# Patient Record
Sex: FEMALE | Race: WHITE
[De-identification: names, ages, dates, MRNs, and addresses within clinical notes are randomized per-mention and may not be internally consistent; named-entity substitution may affect disease eponyms.]

---

## 2018-03-26 PROBLEM — Z00.00 ENCOUNTER FOR PREVENTIVE HEALTH EXAMINATION: Status: ACTIVE | Noted: 2018-03-26

## 2018-03-28 ENCOUNTER — APPOINTMENT (OUTPATIENT)
Dept: OBGYN | Facility: CLINIC | Age: 68
End: 2018-03-28
Payer: MEDICARE

## 2018-03-28 VITALS
HEIGHT: 62 IN | BODY MASS INDEX: 26.31 KG/M2 | OXYGEN SATURATION: 98 % | DIASTOLIC BLOOD PRESSURE: 76 MMHG | SYSTOLIC BLOOD PRESSURE: 118 MMHG | HEART RATE: 85 BPM | WEIGHT: 143 LBS

## 2018-03-28 DIAGNOSIS — Z80.3 FAMILY HISTORY OF MALIGNANT NEOPLASM OF BREAST: ICD-10-CM

## 2018-03-28 DIAGNOSIS — Z78.9 OTHER SPECIFIED HEALTH STATUS: ICD-10-CM

## 2018-03-28 DIAGNOSIS — R92.2 INCONCLUSIVE MAMMOGRAM: ICD-10-CM

## 2018-03-28 DIAGNOSIS — Z80.0 FAMILY HISTORY OF MALIGNANT NEOPLASM OF DIGESTIVE ORGANS: ICD-10-CM

## 2018-03-28 DIAGNOSIS — Z87.891 PERSONAL HISTORY OF NICOTINE DEPENDENCE: ICD-10-CM

## 2018-03-28 DIAGNOSIS — Z82.49 FAMILY HISTORY OF ISCHEMIC HEART DISEASE AND OTHER DISEASES OF THE CIRCULATORY SYSTEM: ICD-10-CM

## 2018-03-28 DIAGNOSIS — H54.8 LEGAL BLINDNESS, AS DEFINED IN USA: ICD-10-CM

## 2018-03-28 DIAGNOSIS — N76.0 ACUTE VAGINITIS: ICD-10-CM

## 2018-03-28 DIAGNOSIS — N95.9 UNSPECIFIED MENOPAUSAL AND PERIMENOPAUSAL DISORDER: ICD-10-CM

## 2018-03-28 DIAGNOSIS — D27.9 BENIGN NEOPLASM OF UNSPECIFIED OVARY: ICD-10-CM

## 2018-03-28 PROCEDURE — 99203 OFFICE O/P NEW LOW 30 MIN: CPT

## 2018-03-28 RX ORDER — LOSARTAN POTASSIUM 50 MG/1
50 TABLET, FILM COATED ORAL
Refills: 0 | Status: ACTIVE | COMMUNITY

## 2018-03-28 RX ORDER — ASCORBIC ACID 500 MG
500 TABLET ORAL
Refills: 0 | Status: ACTIVE | COMMUNITY

## 2018-03-28 RX ORDER — CHROMIUM 200 MCG
1000 TABLET ORAL
Refills: 0 | Status: ACTIVE | COMMUNITY

## 2018-04-05 ENCOUNTER — OTHER (OUTPATIENT)
Age: 68
End: 2018-04-05

## 2018-04-08 LAB
CANDIDA VAG CYTO: NOT DETECTED
CYTOLOGY CVX/VAG DOC THIN PREP: NORMAL
G VAGINALIS+PREV SP MTYP VAG QL MICRO: NOT DETECTED
HPV HIGH+LOW RISK DNA PNL CVX: NOT DETECTED
T VAGINALIS VAG QL WET PREP: NOT DETECTED

## 2018-09-12 ENCOUNTER — OTHER (OUTPATIENT)
Age: 68
End: 2018-09-12

## 2019-08-30 ENCOUNTER — APPOINTMENT (OUTPATIENT)
Dept: OBGYN | Facility: CLINIC | Age: 69
End: 2019-08-30
Payer: MEDICARE

## 2019-08-30 VITALS
DIASTOLIC BLOOD PRESSURE: 70 MMHG | SYSTOLIC BLOOD PRESSURE: 110 MMHG | OXYGEN SATURATION: 98 % | HEART RATE: 68 BPM | BODY MASS INDEX: 25.21 KG/M2 | WEIGHT: 137 LBS | HEIGHT: 62 IN

## 2019-08-30 DIAGNOSIS — Z01.419 ENCOUNTER FOR GYNECOLOGICAL EXAMINATION (GENERAL) (ROUTINE) W/OUT ABNORMAL FINDINGS: ICD-10-CM

## 2019-08-30 PROCEDURE — G0101: CPT

## 2019-08-30 RX ORDER — ESCITALOPRAM OXALATE 10 MG/1
10 TABLET ORAL
Refills: 0 | Status: DISCONTINUED | COMMUNITY
End: 2019-08-30

## 2019-08-30 NOTE — PHYSICAL EXAM
[Awake] : awake [Alert] : alert [Soft] : soft [Oriented x3] : oriented to person, place, and time [Normal] : cervix [No Bleeding] : there was no active vaginal bleeding [Mass] : no breast mass [Acute Distress] : no acute distress [Axillary LAD] : no axillary lymphadenopathy [Nipple Discharge] : no nipple discharge [Tender] : non tender [Atrophy] : atrophy [Absent] : absent [Adnexa Absent] : absent bilaterally

## 2019-08-30 NOTE — CHIEF COMPLAINT
[Annual Visit] : annual visit [FreeTextEntry1] : P0\par rec [place] for vag dryness--[place] off season\par pap 2018\par mammo 2018,colon 2014,dexa 2018\par DANISHA/BSO--dermoid-2001

## 2019-08-30 NOTE — HISTORY OF PRESENT ILLNESS
[Good] : being in good health [Postmenopausal] : is postmenopausal [HPV Vaccine NA Due to Age] : HPV vaccine not available to patient due to age [Sexually Active] : is sexually active

## 2020-12-16 PROBLEM — N76.0 ACUTE VAGINITIS: Status: RESOLVED | Noted: 2018-03-28 | Resolved: 2020-12-16

## 2020-12-21 PROBLEM — Z01.419 ENCOUNTER FOR ANNUAL ROUTINE GYNECOLOGICAL EXAMINATION: Status: RESOLVED | Noted: 2019-08-28 | Resolved: 2020-12-21

## 2021-12-28 ENCOUNTER — EMERGENCY (EMERGENCY)
Facility: HOSPITAL | Age: 71
LOS: 0 days | Discharge: HOME | End: 2021-12-28
Attending: STUDENT IN AN ORGANIZED HEALTH CARE EDUCATION/TRAINING PROGRAM
Payer: MEDICARE

## 2021-12-28 VITALS
RESPIRATION RATE: 18 BRPM | DIASTOLIC BLOOD PRESSURE: 60 MMHG | SYSTOLIC BLOOD PRESSURE: 104 MMHG | HEART RATE: 78 BPM | OXYGEN SATURATION: 100 % | TEMPERATURE: 98 F

## 2021-12-28 VITALS
TEMPERATURE: 98 F | DIASTOLIC BLOOD PRESSURE: 119 MMHG | SYSTOLIC BLOOD PRESSURE: 148 MMHG | RESPIRATION RATE: 20 BRPM | HEIGHT: 62 IN | OXYGEN SATURATION: 99 % | HEART RATE: 77 BPM

## 2021-12-28 DIAGNOSIS — R63.0 ANOREXIA: ICD-10-CM

## 2021-12-28 DIAGNOSIS — Z20.822 CONTACT WITH AND (SUSPECTED) EXPOSURE TO COVID-19: ICD-10-CM

## 2021-12-28 DIAGNOSIS — R07.89 OTHER CHEST PAIN: ICD-10-CM

## 2021-12-28 DIAGNOSIS — R42 DIZZINESS AND GIDDINESS: ICD-10-CM

## 2021-12-28 DIAGNOSIS — Z79.82 LONG TERM (CURRENT) USE OF ASPIRIN: ICD-10-CM

## 2021-12-28 DIAGNOSIS — Z90.710 ACQUIRED ABSENCE OF BOTH CERVIX AND UTERUS: Chronic | ICD-10-CM

## 2021-12-28 DIAGNOSIS — Z86.69 PERSONAL HISTORY OF OTHER DISEASES OF THE NERVOUS SYSTEM AND SENSE ORGANS: Chronic | ICD-10-CM

## 2021-12-28 DIAGNOSIS — I10 ESSENTIAL (PRIMARY) HYPERTENSION: ICD-10-CM

## 2021-12-28 LAB
ALBUMIN SERPL ELPH-MCNC: 3.5 G/DL — SIGNIFICANT CHANGE UP (ref 3.3–5)
ALP SERPL-CCNC: 88 U/L — SIGNIFICANT CHANGE UP (ref 40–120)
ALT FLD-CCNC: 26 U/L — SIGNIFICANT CHANGE UP (ref 12–78)
ANION GAP SERPL CALC-SCNC: 5 MMOL/L — SIGNIFICANT CHANGE UP (ref 5–17)
APPEARANCE UR: CLEAR — SIGNIFICANT CHANGE UP
APTT BLD: 30.8 SEC — SIGNIFICANT CHANGE UP (ref 27.5–35.5)
AST SERPL-CCNC: 29 U/L — SIGNIFICANT CHANGE UP (ref 15–37)
BACTERIA # UR AUTO: NEGATIVE — SIGNIFICANT CHANGE UP
BASOPHILS # BLD AUTO: 0.09 K/UL — SIGNIFICANT CHANGE UP (ref 0–0.2)
BASOPHILS NFR BLD AUTO: 0.9 % — SIGNIFICANT CHANGE UP (ref 0–2)
BILIRUB SERPL-MCNC: 0.4 MG/DL — SIGNIFICANT CHANGE UP (ref 0.2–1.2)
BILIRUB UR-MCNC: NEGATIVE — SIGNIFICANT CHANGE UP
BUN SERPL-MCNC: 15 MG/DL — SIGNIFICANT CHANGE UP (ref 7–23)
CALCIUM SERPL-MCNC: 9.3 MG/DL — SIGNIFICANT CHANGE UP (ref 8.5–10.1)
CHLORIDE SERPL-SCNC: 105 MMOL/L — SIGNIFICANT CHANGE UP (ref 96–108)
CO2 SERPL-SCNC: 25 MMOL/L — SIGNIFICANT CHANGE UP (ref 22–31)
COLOR SPEC: YELLOW — SIGNIFICANT CHANGE UP
CREAT SERPL-MCNC: 0.74 MG/DL — SIGNIFICANT CHANGE UP (ref 0.5–1.3)
DIFF PNL FLD: ABNORMAL
EOSINOPHIL # BLD AUTO: 0.38 K/UL — SIGNIFICANT CHANGE UP (ref 0–0.5)
EOSINOPHIL NFR BLD AUTO: 3.7 % — SIGNIFICANT CHANGE UP (ref 0–6)
EPI CELLS # UR: NEGATIVE — SIGNIFICANT CHANGE UP
GLUCOSE SERPL-MCNC: 98 MG/DL — SIGNIFICANT CHANGE UP (ref 70–99)
GLUCOSE UR QL: NEGATIVE MG/DL — SIGNIFICANT CHANGE UP
HCT VFR BLD CALC: 40.8 % — SIGNIFICANT CHANGE UP (ref 34.5–45)
HGB BLD-MCNC: 13.4 G/DL — SIGNIFICANT CHANGE UP (ref 11.5–15.5)
HYALINE CASTS # UR AUTO: ABNORMAL /LPF
IMM GRANULOCYTES NFR BLD AUTO: 0.3 % — SIGNIFICANT CHANGE UP (ref 0–1.5)
INR BLD: 1.02 RATIO — SIGNIFICANT CHANGE UP (ref 0.88–1.16)
KETONES UR-MCNC: NEGATIVE — SIGNIFICANT CHANGE UP
LEUKOCYTE ESTERASE UR-ACNC: NEGATIVE — SIGNIFICANT CHANGE UP
LYMPHOCYTES # BLD AUTO: 1.95 K/UL — SIGNIFICANT CHANGE UP (ref 1–3.3)
LYMPHOCYTES # BLD AUTO: 19.1 % — SIGNIFICANT CHANGE UP (ref 13–44)
MCHC RBC-ENTMCNC: 30.1 PG — SIGNIFICANT CHANGE UP (ref 27–34)
MCHC RBC-ENTMCNC: 32.8 GM/DL — SIGNIFICANT CHANGE UP (ref 32–36)
MCV RBC AUTO: 91.7 FL — SIGNIFICANT CHANGE UP (ref 80–100)
MONOCYTES # BLD AUTO: 0.66 K/UL — SIGNIFICANT CHANGE UP (ref 0–0.9)
MONOCYTES NFR BLD AUTO: 6.5 % — SIGNIFICANT CHANGE UP (ref 2–14)
NEUTROPHILS # BLD AUTO: 7.08 K/UL — SIGNIFICANT CHANGE UP (ref 1.8–7.4)
NEUTROPHILS NFR BLD AUTO: 69.5 % — SIGNIFICANT CHANGE UP (ref 43–77)
NITRITE UR-MCNC: NEGATIVE — SIGNIFICANT CHANGE UP
PH UR: 5 — SIGNIFICANT CHANGE UP (ref 5–8)
PLATELET # BLD AUTO: 309 K/UL — SIGNIFICANT CHANGE UP (ref 150–400)
POTASSIUM SERPL-MCNC: 4.2 MMOL/L — SIGNIFICANT CHANGE UP (ref 3.5–5.3)
POTASSIUM SERPL-SCNC: 4.2 MMOL/L — SIGNIFICANT CHANGE UP (ref 3.5–5.3)
PROT SERPL-MCNC: 7 GM/DL — SIGNIFICANT CHANGE UP (ref 6–8.3)
PROT UR-MCNC: NEGATIVE MG/DL — SIGNIFICANT CHANGE UP
PROTHROM AB SERPL-ACNC: 11.8 SEC — SIGNIFICANT CHANGE UP (ref 10.6–13.6)
RBC # BLD: 4.45 M/UL — SIGNIFICANT CHANGE UP (ref 3.8–5.2)
RBC # FLD: 11.9 % — SIGNIFICANT CHANGE UP (ref 10.3–14.5)
RBC CASTS # UR COMP ASSIST: SIGNIFICANT CHANGE UP /HPF (ref 0–4)
SARS-COV-2 RNA SPEC QL NAA+PROBE: SIGNIFICANT CHANGE UP
SODIUM SERPL-SCNC: 135 MMOL/L — SIGNIFICANT CHANGE UP (ref 135–145)
SP GR SPEC: 1.01 — SIGNIFICANT CHANGE UP (ref 1.01–1.02)
TROPONIN I, HIGH SENSITIVITY RESULT: 10.51 NG/L — SIGNIFICANT CHANGE UP
TROPONIN I, HIGH SENSITIVITY RESULT: 28.22 NG/L — SIGNIFICANT CHANGE UP
UROBILINOGEN FLD QL: NEGATIVE MG/DL — SIGNIFICANT CHANGE UP
WBC # BLD: 10.19 K/UL — SIGNIFICANT CHANGE UP (ref 3.8–10.5)
WBC # FLD AUTO: 10.19 K/UL — SIGNIFICANT CHANGE UP (ref 3.8–10.5)
WBC UR QL: SIGNIFICANT CHANGE UP

## 2021-12-28 PROCEDURE — 99283 EMERGENCY DEPT VISIT LOW MDM: CPT | Mod: 25

## 2021-12-28 PROCEDURE — 85610 PROTHROMBIN TIME: CPT

## 2021-12-28 PROCEDURE — 85730 THROMBOPLASTIN TIME PARTIAL: CPT

## 2021-12-28 PROCEDURE — U0005: CPT

## 2021-12-28 PROCEDURE — 81001 URINALYSIS AUTO W/SCOPE: CPT

## 2021-12-28 PROCEDURE — 99285 EMERGENCY DEPT VISIT HI MDM: CPT | Mod: CS

## 2021-12-28 PROCEDURE — 85025 COMPLETE CBC W/AUTO DIFF WBC: CPT

## 2021-12-28 PROCEDURE — 36415 COLL VENOUS BLD VENIPUNCTURE: CPT

## 2021-12-28 PROCEDURE — 84484 ASSAY OF TROPONIN QUANT: CPT

## 2021-12-28 PROCEDURE — 93005 ELECTROCARDIOGRAM TRACING: CPT

## 2021-12-28 PROCEDURE — 71045 X-RAY EXAM CHEST 1 VIEW: CPT

## 2021-12-28 PROCEDURE — 93010 ELECTROCARDIOGRAM REPORT: CPT

## 2021-12-28 PROCEDURE — 80053 COMPREHEN METABOLIC PANEL: CPT

## 2021-12-28 PROCEDURE — 71045 X-RAY EXAM CHEST 1 VIEW: CPT | Mod: 26

## 2021-12-28 PROCEDURE — U0003: CPT

## 2021-12-28 RX ORDER — FAMOTIDINE 10 MG/ML
20 INJECTION INTRAVENOUS DAILY
Refills: 0 | Status: DISCONTINUED | OUTPATIENT
Start: 2021-12-28 | End: 2021-12-28

## 2021-12-28 RX ORDER — SODIUM CHLORIDE 9 MG/ML
1000 INJECTION INTRAMUSCULAR; INTRAVENOUS; SUBCUTANEOUS ONCE
Refills: 0 | Status: COMPLETED | OUTPATIENT
Start: 2021-12-28 | End: 2021-12-28

## 2021-12-28 RX ADMIN — Medication 30 MILLILITER(S): at 13:46

## 2021-12-28 RX ADMIN — SODIUM CHLORIDE 1000 MILLILITER(S): 9 INJECTION INTRAMUSCULAR; INTRAVENOUS; SUBCUTANEOUS at 13:46

## 2021-12-28 RX ADMIN — FAMOTIDINE 20 MILLIGRAM(S): 10 INJECTION INTRAVENOUS at 13:46

## 2021-12-28 NOTE — ED PROVIDER NOTE - OBJECTIVE STATEMENT
71 F history afib, HTN, legally blind here feeling light headed and with chest pressure. patient has not had much of an appetite. increased stress due to sick . no significant shortness of breath, chest pain is described as tightness, no radiation of symptoms. no nausea, vomiting. prior smoker.    EP: Dr. Guerrero  PMD Dr. Corbin.

## 2021-12-28 NOTE — ED ADULT TRIAGE NOTE - CHIEF COMPLAINT QUOTE
Pt arrives to ED complaining of chest pain for two hours prior to arrival. Pt took one baby aspirin prior to arrival. Hx afib-s/p ablation, loop recorder.

## 2021-12-28 NOTE — ED PROVIDER NOTE - NS ED ROS FT
Constitutional: No fever.  Eyes: No vision changes.   Ears, Nose, Mouth, Throat: No congestion.  Cardiovascular: +chest pain. +lightheaded.  Respiratory: No difficulty breathing.  Gastrointestinal: No nausea . No vomiting.  Genitourinary: No dysuria.  Musculoskeletal: No joint pain.  Neurological: No headache.   Integumentary (skin and/or breast): No rash.

## 2021-12-28 NOTE — ED ADULT NURSE NOTE - OBJECTIVE STATEMENT
pt. c/o chest pain and dizziness, pt. states she has been under a lot of stress, her  is very sick and she has no help at home, pt. states she does not want to speak to SW.

## 2021-12-28 NOTE — ED PROVIDER NOTE - NSFOLLOWUPINSTRUCTIONS_ED_ALL_ED_FT
Please follow up with your Primary Care Physician and any specialists as discussed.  Please take your medications as prescribed.  If your symptoms persist or worsen, please seek care. Either return to the Emergency Department, go to urgent care or see your primary care doctor.  See refer to general information and follow up instructions below:       Lightheadedness    WHAT YOU NEED TO KNOW:    Lightheadedness is the feeling that you may faint, but you do not. Your heartbeat may be fast or feel like it flutters. Lightheadedness may occur when you take certain medicines, such as medicine to lower your blood pressure. Dehydration, low sodium, low blood sugar, an abnormal heart rhythm, and anxiety are other common causes.     DISCHARGE INSTRUCTIONS:    Return to the emergency department if:   •You have sudden chest pain.       •You have trouble breathing or shortness of breath.       •You have vision changes, are sweating, and have nausea while you are sitting or lying down.       •You feel flushed and your heart is fluttering.      •You faint.       Contact your healthcare provider if:   •You feel lightheaded often.       •Your heart beats faster or slower than usual.       •You have questions or concerns about your condition or care.      Follow up with your healthcare provider as directed: You may need more tests to help find the cause of your lightheadedness. The tests will help healthcare providers plan the best treatment for you. Write down your questions so you remember to ask them during your visits.     Self-care: Talk with your healthcare provider about these and other ways to manage your symptoms:  •Lie down when you feel lightheaded, your throat gets tight, or your vision changes. Raise your legs above the level of your heart.      •Stand up slowly. Sit on the side of the bed or couch for a few minutes before you stand up.       •Take slow, deep breaths when you feel lightheaded. This can help decrease the feeling that you might faint.       •Ask if you need to avoid hot baths and saunas. These may make your symptoms worse.       Watch for signs of low blood sugar: These include hunger, nervousness, sweating, and fast or fluttery heartbeats. Talk with your healthcare provider about ways to keep your blood sugar level steady.    Check your blood pressure often: You should do this especially if you take medicine to lower your blood pressure. Check your blood pressure when you are lying down and when you are standing. Ask how often to check during the day. Keep a record of your blood pressure numbers. Your healthcare provider may use the record to help plan your treatment.    Keep a record of your lightheadedness episodes: Include your symptoms and your activity before and after the episode. The record can help your healthcare provider find the cause of your lightheadedness and help you manage episodes.

## 2021-12-28 NOTE — ED PROVIDER NOTE - PHYSICAL EXAMINATION
Vital signs as available reviewed.  General:  Comfortable, no acute distress.  Head:  Normocephalic, atraumatic.  Eyes:  Conjunctiva pink, no icterus.  Cardiovascular:  Regular rate, no obvious murmur.  Respiratory:  Clear to auscultation, good air entry bilaterally.  Abdomen:  Soft, non-tender.  Musculoskeletal:  No deformity or calf tenderness.  Neurologic: Alert and oriented, moving all extremities. CN2-12 intact.  Skin:  Warm and dry.

## 2021-12-28 NOTE — ED PROVIDER NOTE - PATIENT PORTAL LINK FT
You can access the FollowMyHealth Patient Portal offered by [facility] by registering at the following website: [email or web address]. By joining [employer]’s FollowMyHealth portal, you will also be able to view your health information using other applications (apps) compatible with our system.

## 2021-12-28 NOTE — ED PROVIDER NOTE - NSICDXPASTMEDICALHX_GEN_ALL_CORE_FT
PAST MEDICAL HISTORY:  Anxiety and depression     Arthritis     Bronchitis     Hypercholesterolemia     Hypertension     Legally blind Bilaterally - notes L< 20% vision    Muscle cramps     Primary osteoarthritis of right hip

## 2021-12-28 NOTE — ED PROVIDER NOTE - CARE PROVIDER_API CALL
Joe Corbin  INTERNAL MEDICINE  [address] [place] [address]  Phone: [phone]  Fax: [phone]  Established Patient  Follow Up Time: 1-3 Days    German Paredes (DO)  Cardiology  [address] [place] [address]  Phone: [phone]  Fax: [phone]  Follow Up Time: 4-6 Days

## 2021-12-28 NOTE — ED PROVIDER NOTE - PROVIDER TOKENS
PROVIDER:[TOKEN:[[id number]],FOLLOWUP:[1-3 Days],ESTABLISHEDPATIENT:[T]],PROVIDER:[TOKEN:[[id number]],FOLLOWUP:[4-6 Days]]

## 2022-01-03 ENCOUNTER — TRANSCRIPTION ENCOUNTER (OUTPATIENT)
Age: 72
End: 2022-01-03

## 2022-02-28 ENCOUNTER — NON-APPOINTMENT (OUTPATIENT)
Age: 72
End: 2022-02-28

## 2022-02-28 ENCOUNTER — APPOINTMENT (OUTPATIENT)
Dept: ORTHOPEDIC SURGERY | Facility: CLINIC | Age: 72
End: 2022-02-28
Payer: MEDICARE

## 2022-02-28 VITALS
HEART RATE: 70 BPM | DIASTOLIC BLOOD PRESSURE: 72 MMHG | HEIGHT: 62 IN | BODY MASS INDEX: 23.19 KG/M2 | SYSTOLIC BLOOD PRESSURE: 108 MMHG | WEIGHT: 126 LBS

## 2022-02-28 DIAGNOSIS — M48.062 SPINAL STENOSIS, LUMBAR REGION WITH NEUROGENIC CLAUDICATION: ICD-10-CM

## 2022-02-28 DIAGNOSIS — M41.80 OTHER FORMS OF SCOLIOSIS, SITE UNSPECIFIED: ICD-10-CM

## 2022-02-28 DIAGNOSIS — Z96.641 PRESENCE OF RIGHT ARTIFICIAL HIP JOINT: ICD-10-CM

## 2022-02-28 DIAGNOSIS — M54.50 LOW BACK PAIN, UNSPECIFIED: ICD-10-CM

## 2022-02-28 PROCEDURE — 72110 X-RAY EXAM L-2 SPINE 4/>VWS: CPT

## 2022-02-28 PROCEDURE — 99204 OFFICE O/P NEW MOD 45 MIN: CPT

## 2022-02-28 PROCEDURE — 72170 X-RAY EXAM OF PELVIS: CPT | Mod: 59

## 2022-02-28 NOTE — HISTORY OF PRESENT ILLNESS
[Worsening] : worsening [3] : a current pain level of 3/10 [Constant] : ~He/She~ states the symptoms seem to be constant [Bending] : worsened by bending [Prolonged Sitting] : worsened by prolonged sitting [Prolonged Standing] : worsened by prolonged standing [Walking] : worsened by walking [de-identified] : Patient presents here today for initial evaluation of  low back pain for 6 months and worsening. Patent reports low back pain level today is 3/10 in intensity and radiates to bilateral lower extremities but more on left lower extremity and left groin and thigh with numbness and tingling on both thighs and legs.\par Fell in 8/2021 in living room aggravated her symptoms\par Pain in left leg primarily moves around pain in left groin\par Hx of R THR ~5 yrs ago [de-identified] : T [de-identified] : Tylenol as needed

## 2022-02-28 NOTE — PHYSICAL EXAM
[Stooped] : stooped [SLR] : negative straight leg raise [LE] : Sensory: Intact in bilateral lower extremities [1+] : left ankle jerk 1+ [Plantar Reflex Right Only] : absent on the right [Plantar Reflex Left Only] : absent on the left [DTR Reflexes Clonus Of Right Ankle (___ Beats)] : absent on the right [DTR Reflexes Clonus Of Left Ankle (___ Beats)] : absent on the left [de-identified] : seen with her \par The pt is awake, alert and oriented to self, place and time, is comfortable and in no acute distress. Inspection of neck, back and lower extremities bilaterally reveals no rashes or ecchymotic lesions.  There is no obvious abnormal spinal curvature in the sagittal and coronal planes. There is no tenderness over the cervical, thoracic or lumbar spine, or the paraspinal or upper and lower extremities musculature. There is no sacroiliac tenderness. No greater trochanteric tenderness bilaterally. No atrophy or abnormal movements noted in the upper or lower extremities. There is no swelling noted in the upper or lower extremities bilaterally. No cervical lymphadenopathy noted anteriorly. No joint laxity noted in the upper and lower extremity joints bilaterally.\par  Full range of motion of the shoulders bilaterally with no significant pain\par  [de-identified] : left groin pain with hip IR, left GT tenderness\par healed right hip inciison\par flexion to mid tibia, ext 30 degrees minimal pain\par  [de-identified] : 4 views lumbar spine demonstrate approximate 30 degree left-sided curve from T12-L4.  Partially visualized right lobe arthroplasty implant.  Normal lumbar lordosis.  Severe disc degeneration seen at the L2-3 level with loss of disc endplate sclerosis and some anterior osteophyte.  Between flexion-extension no dynamic instability.  No acute fractures.  Calcification of the abdominal aorta.\par \par AP pelvis demonstrates partially visualized right total hip arthroplasty implant.  Degenerative change of the left hip with significant loss of joint space endplate sclerosis joint sclerosis or subchondral cyst suspected.  No obvious acute fractures.

## 2022-02-28 NOTE — DISCUSSION/SUMMARY
[Medication Risks Reviewed] : Medication risks reviewed [de-identified] : The patient today presents with symptoms consistent with left hip osteoarthritis as well as lumbar radiculopathy in setting of degenerative scoliosis of disc degeneration.  She will follow up with a hip surgeon's office for further evaluation.  She not interested in injections in her spine or hip at this time and takes Eliquis.  I will see her back in 4 weeks for reevaluation on as-needed basis.

## 2022-03-19 ENCOUNTER — APPOINTMENT (OUTPATIENT)
Dept: ORTHOPEDIC SURGERY | Facility: CLINIC | Age: 72
End: 2022-03-19
Payer: MEDICARE

## 2022-03-19 VITALS
BODY MASS INDEX: 23.74 KG/M2 | HEIGHT: 62 IN | SYSTOLIC BLOOD PRESSURE: 110 MMHG | HEART RATE: 71 BPM | DIASTOLIC BLOOD PRESSURE: 72 MMHG | WEIGHT: 129 LBS

## 2022-03-19 DIAGNOSIS — M16.12 UNILATERAL PRIMARY OSTEOARTHRITIS, LEFT HIP: ICD-10-CM

## 2022-03-19 DIAGNOSIS — M25.552 PAIN IN LEFT HIP: ICD-10-CM

## 2022-03-19 PROCEDURE — 73502 X-RAY EXAM HIP UNI 2-3 VIEWS: CPT | Mod: LT

## 2022-03-19 PROCEDURE — 99214 OFFICE O/P EST MOD 30 MIN: CPT

## 2022-03-19 RX ORDER — AMOXICILLIN 500 MG/1
500 CAPSULE ORAL
Qty: 20 | Refills: 0 | Status: DISCONTINUED | COMMUNITY
Start: 2021-06-14 | End: 2022-03-19

## 2022-03-19 RX ORDER — FERROUS SULFATE 325(65) MG
325 (65 FE) TABLET ORAL
Refills: 0 | Status: DISCONTINUED | COMMUNITY
End: 2022-03-19

## 2022-03-19 RX ORDER — KRILL/OM-3/DHA/EPA/PHOSPHO/AST 1000-230MG
CAPSULE ORAL
Refills: 0 | Status: DISCONTINUED | COMMUNITY
End: 2022-03-19

## 2022-03-19 RX ORDER — METHYLPREDNISOLONE 4 MG/1
4 TABLET ORAL
Qty: 21 | Refills: 0 | Status: DISCONTINUED | COMMUNITY
Start: 2022-01-28 | End: 2022-03-19

## 2022-03-19 RX ORDER — ASPIRIN ENTERIC COATED TABLETS 81 MG 81 MG/1
81 TABLET, DELAYED RELEASE ORAL
Refills: 0 | Status: DISCONTINUED | COMMUNITY
End: 2022-03-19

## 2022-04-01 ENCOUNTER — OUTPATIENT (OUTPATIENT)
Dept: OUTPATIENT SERVICES | Facility: HOSPITAL | Age: 72
LOS: 1 days | End: 2022-04-01
Payer: MEDICARE

## 2022-04-01 VITALS
HEART RATE: 82 BPM | HEIGHT: 61 IN | DIASTOLIC BLOOD PRESSURE: 83 MMHG | WEIGHT: 125 LBS | SYSTOLIC BLOOD PRESSURE: 132 MMHG | OXYGEN SATURATION: 98 % | RESPIRATION RATE: 15 BRPM | TEMPERATURE: 97 F

## 2022-04-01 DIAGNOSIS — Z86.69 PERSONAL HISTORY OF OTHER DISEASES OF THE NERVOUS SYSTEM AND SENSE ORGANS: Chronic | ICD-10-CM

## 2022-04-01 DIAGNOSIS — Z98.890 OTHER SPECIFIED POSTPROCEDURAL STATES: Chronic | ICD-10-CM

## 2022-04-01 DIAGNOSIS — Z01.818 ENCOUNTER FOR OTHER PREPROCEDURAL EXAMINATION: ICD-10-CM

## 2022-04-01 DIAGNOSIS — Z90.710 ACQUIRED ABSENCE OF BOTH CERVIX AND UTERUS: Chronic | ICD-10-CM

## 2022-04-01 DIAGNOSIS — M16.12 UNILATERAL PRIMARY OSTEOARTHRITIS, LEFT HIP: ICD-10-CM

## 2022-04-01 DIAGNOSIS — Z96.641 PRESENCE OF RIGHT ARTIFICIAL HIP JOINT: Chronic | ICD-10-CM

## 2022-04-01 LAB
A1C WITH ESTIMATED AVERAGE GLUCOSE RESULT: 5.5 % — SIGNIFICANT CHANGE UP (ref 4–5.6)
ALBUMIN SERPL ELPH-MCNC: 3.8 G/DL — SIGNIFICANT CHANGE UP (ref 3.3–5)
ALP SERPL-CCNC: 83 U/L — SIGNIFICANT CHANGE UP (ref 30–120)
ALT FLD-CCNC: 27 U/L DA — SIGNIFICANT CHANGE UP (ref 10–60)
ANION GAP SERPL CALC-SCNC: 4 MMOL/L — LOW (ref 5–17)
APTT BLD: 33.1 SEC — SIGNIFICANT CHANGE UP (ref 27.5–35.5)
AST SERPL-CCNC: 21 U/L — SIGNIFICANT CHANGE UP (ref 10–40)
BILIRUB SERPL-MCNC: 0.3 MG/DL — SIGNIFICANT CHANGE UP (ref 0.2–1.2)
BLD GP AB SCN SERPL QL: SIGNIFICANT CHANGE UP
BUN SERPL-MCNC: 13 MG/DL — SIGNIFICANT CHANGE UP (ref 7–23)
CALCIUM SERPL-MCNC: 8.8 MG/DL — SIGNIFICANT CHANGE UP (ref 8.4–10.5)
CHLORIDE SERPL-SCNC: 104 MMOL/L — SIGNIFICANT CHANGE UP (ref 96–108)
CO2 SERPL-SCNC: 29 MMOL/L — SIGNIFICANT CHANGE UP (ref 22–31)
CREAT SERPL-MCNC: 0.71 MG/DL — SIGNIFICANT CHANGE UP (ref 0.5–1.3)
EGFR: 90 ML/MIN/1.73M2 — SIGNIFICANT CHANGE UP
ESTIMATED AVERAGE GLUCOSE: 111 MG/DL — SIGNIFICANT CHANGE UP (ref 68–114)
GLUCOSE SERPL-MCNC: 92 MG/DL — SIGNIFICANT CHANGE UP (ref 70–99)
HCT VFR BLD CALC: 38.7 % — SIGNIFICANT CHANGE UP (ref 34.5–45)
HGB BLD-MCNC: 12.8 G/DL — SIGNIFICANT CHANGE UP (ref 11.5–15.5)
INR BLD: 1.06 RATIO — SIGNIFICANT CHANGE UP (ref 0.88–1.16)
MCHC RBC-ENTMCNC: 30.3 PG — SIGNIFICANT CHANGE UP (ref 27–34)
MCHC RBC-ENTMCNC: 33.1 GM/DL — SIGNIFICANT CHANGE UP (ref 32–36)
MCV RBC AUTO: 91.7 FL — SIGNIFICANT CHANGE UP (ref 80–100)
NRBC # BLD: 0 /100 WBCS — SIGNIFICANT CHANGE UP (ref 0–0)
PLATELET # BLD AUTO: 282 K/UL — SIGNIFICANT CHANGE UP (ref 150–400)
POTASSIUM SERPL-MCNC: 4.3 MMOL/L — SIGNIFICANT CHANGE UP (ref 3.5–5.3)
POTASSIUM SERPL-SCNC: 4.3 MMOL/L — SIGNIFICANT CHANGE UP (ref 3.5–5.3)
PROT SERPL-MCNC: 7.1 G/DL — SIGNIFICANT CHANGE UP (ref 6–8.3)
PROTHROM AB SERPL-ACNC: 12.2 SEC — SIGNIFICANT CHANGE UP (ref 10.5–13.4)
RBC # BLD: 4.22 M/UL — SIGNIFICANT CHANGE UP (ref 3.8–5.2)
RBC # FLD: 12.5 % — SIGNIFICANT CHANGE UP (ref 10.3–14.5)
SODIUM SERPL-SCNC: 137 MMOL/L — SIGNIFICANT CHANGE UP (ref 135–145)
WBC # BLD: 7.46 K/UL — SIGNIFICANT CHANGE UP (ref 3.8–10.5)
WBC # FLD AUTO: 7.46 K/UL — SIGNIFICANT CHANGE UP (ref 3.8–10.5)

## 2022-04-01 PROCEDURE — G0463: CPT

## 2022-04-01 PROCEDURE — 93005 ELECTROCARDIOGRAM TRACING: CPT

## 2022-04-01 PROCEDURE — 93010 ELECTROCARDIOGRAM REPORT: CPT

## 2022-04-01 NOTE — H&P PST ADULT - NSICDXPASTMEDICALHX_GEN_ALL_CORE_FT
PAST MEDICAL HISTORY:  Anxiety and depression     Arthritis     COVID-19 vaccine series completed     History of atrial fibrillation     History of cardiac radiofrequency ablation 2021    Hypercholesterolemia     Hypertension     Legally blind Bilaterally - notes L< 20% vision    Muscle cramps     Optic nerve drusen, bilateral     Osteoarthritis of left hip     Primary osteoarthritis of right hip     Status post placement of implantable loop recorder

## 2022-04-01 NOTE — H&P PST ADULT - NSICDXFAMILYHX_GEN_ALL_CORE_FT
FAMILY HISTORY:  Father  Still living? No  Family history of heart attack, Age at diagnosis: Age Unknown    Sibling  Still living? No  Family history of colon cancer, Age at diagnosis: Age Unknown

## 2022-04-01 NOTE — H&P PST ADULT - NSICDXPASTSURGICALHX_GEN_ALL_CORE_FT
PAST SURGICAL HISTORY:  H/O cataract bilateral, 2011    History of breast lump removal left, age 31    History of right hip replacement 2016    S/P hysterectomy 2012    Status post Mohs surgery after 2010's

## 2022-04-01 NOTE — H&P PST ADULT - HISTORY OF PRESENT ILLNESS
72 yo female reports left hip pain with radiation to groin for 1 year.  Pain worst with abduction and adduction rating 10/10.  She is scheduled for left total anterior hip replacement on 4/20/2022 @ [facility].

## 2022-04-02 LAB
MRSA PCR RESULT.: SIGNIFICANT CHANGE UP
S AUREUS DNA NOSE QL NAA+PROBE: SIGNIFICANT CHANGE UP

## 2022-04-06 PROBLEM — Z92.29 PERSONAL HISTORY OF OTHER DRUG THERAPY: Chronic | Status: ACTIVE | Noted: 2022-04-01

## 2022-04-06 PROBLEM — Z98.890 OTHER SPECIFIED POSTPROCEDURAL STATES: Chronic | Status: ACTIVE | Noted: 2022-04-01

## 2022-04-06 PROBLEM — Z86.79 PERSONAL HISTORY OF OTHER DISEASES OF THE CIRCULATORY SYSTEM: Chronic | Status: ACTIVE | Noted: 2022-04-01

## 2022-04-06 PROBLEM — M16.12 UNILATERAL PRIMARY OSTEOARTHRITIS, LEFT HIP: Chronic | Status: ACTIVE | Noted: 2022-04-01

## 2022-04-06 PROBLEM — H47.323 DRUSEN OF OPTIC DISC, BILATERAL: Chronic | Status: ACTIVE | Noted: 2022-04-01

## 2022-04-06 PROBLEM — Z95.818 PRESENCE OF OTHER CARDIAC IMPLANTS AND GRAFTS: Chronic | Status: ACTIVE | Noted: 2022-04-01

## 2022-04-12 ENCOUNTER — APPOINTMENT (OUTPATIENT)
Dept: CT IMAGING | Facility: HOSPITAL | Age: 72
End: 2022-04-12
Payer: MEDICARE

## 2022-04-12 ENCOUNTER — OUTPATIENT (OUTPATIENT)
Dept: OUTPATIENT SERVICES | Facility: HOSPITAL | Age: 72
LOS: 1 days | End: 2022-04-12
Payer: MEDICARE

## 2022-04-12 DIAGNOSIS — Z00.8 ENCOUNTER FOR OTHER GENERAL EXAMINATION: ICD-10-CM

## 2022-04-12 DIAGNOSIS — Z90.710 ACQUIRED ABSENCE OF BOTH CERVIX AND UTERUS: Chronic | ICD-10-CM

## 2022-04-12 DIAGNOSIS — Z86.69 PERSONAL HISTORY OF OTHER DISEASES OF THE NERVOUS SYSTEM AND SENSE ORGANS: Chronic | ICD-10-CM

## 2022-04-12 DIAGNOSIS — Z96.641 PRESENCE OF RIGHT ARTIFICIAL HIP JOINT: Chronic | ICD-10-CM

## 2022-04-12 DIAGNOSIS — Z98.890 OTHER SPECIFIED POSTPROCEDURAL STATES: Chronic | ICD-10-CM

## 2022-04-12 PROCEDURE — 74177 CT ABD & PELVIS W/CONTRAST: CPT | Mod: 26,MH

## 2022-04-12 PROCEDURE — 74177 CT ABD & PELVIS W/CONTRAST: CPT | Mod: MH

## 2022-04-16 ENCOUNTER — OUTPATIENT (OUTPATIENT)
Dept: OUTPATIENT SERVICES | Facility: HOSPITAL | Age: 72
LOS: 1 days | End: 2022-04-16
Payer: MEDICARE

## 2022-04-16 DIAGNOSIS — Z96.641 PRESENCE OF RIGHT ARTIFICIAL HIP JOINT: Chronic | ICD-10-CM

## 2022-04-16 DIAGNOSIS — Z20.828 CONTACT WITH AND (SUSPECTED) EXPOSURE TO OTHER VIRAL COMMUNICABLE DISEASES: ICD-10-CM

## 2022-04-16 DIAGNOSIS — Z98.890 OTHER SPECIFIED POSTPROCEDURAL STATES: Chronic | ICD-10-CM

## 2022-04-16 DIAGNOSIS — Z90.710 ACQUIRED ABSENCE OF BOTH CERVIX AND UTERUS: Chronic | ICD-10-CM

## 2022-04-16 DIAGNOSIS — M16.12 UNILATERAL PRIMARY OSTEOARTHRITIS, LEFT HIP: ICD-10-CM

## 2022-04-16 DIAGNOSIS — Z01.818 ENCOUNTER FOR OTHER PREPROCEDURAL EXAMINATION: ICD-10-CM

## 2022-04-16 DIAGNOSIS — Z86.69 PERSONAL HISTORY OF OTHER DISEASES OF THE NERVOUS SYSTEM AND SENSE ORGANS: Chronic | ICD-10-CM

## 2022-04-16 PROCEDURE — U0003: CPT

## 2022-04-16 PROCEDURE — U0005: CPT

## 2022-04-18 LAB — SARS-COV-2 RNA SPEC QL NAA+PROBE: SIGNIFICANT CHANGE UP

## 2022-04-19 ENCOUNTER — FORM ENCOUNTER (OUTPATIENT)
Age: 72
End: 2022-04-19

## 2022-04-19 NOTE — PATIENT PROFILE ADULT - FALL HARM RISK - UNIVERSAL INTERVENTIONS
Bed in lowest position, wheels locked, appropriate side rails in place/Call bell, personal items and telephone in reach/Instruct patient to call for assistance before getting out of bed or chair/Non-slip footwear when patient is out of bed/[place] to call system/Physically safe environment - no spills, clutter or unnecessary equipment/Purposeful Proactive Rounding/Room/bathroom lighting operational, light cord in reach

## 2022-04-20 ENCOUNTER — TRANSCRIPTION ENCOUNTER (OUTPATIENT)
Age: 72
End: 2022-04-20

## 2022-04-20 ENCOUNTER — INPATIENT (INPATIENT)
Facility: HOSPITAL | Age: 72
LOS: 0 days | Discharge: HOME | DRG: 470 | End: 2022-04-21
Attending: ORTHOPAEDIC SURGERY | Admitting: ORTHOPAEDIC SURGERY
Payer: COMMERCIAL

## 2022-04-20 ENCOUNTER — APPOINTMENT (OUTPATIENT)
Dept: ORTHOPEDIC SURGERY | Facility: HOSPITAL | Age: 72
End: 2022-04-20

## 2022-04-20 ENCOUNTER — RESULT REVIEW (OUTPATIENT)
Age: 72
End: 2022-04-20

## 2022-04-20 VITALS
HEIGHT: 62 IN | RESPIRATION RATE: 15 BRPM | OXYGEN SATURATION: 100 % | HEART RATE: 71 BPM | WEIGHT: 131.4 LBS | TEMPERATURE: 97 F | SYSTOLIC BLOOD PRESSURE: 112 MMHG | DIASTOLIC BLOOD PRESSURE: 66 MMHG

## 2022-04-20 DIAGNOSIS — Z98.890 OTHER SPECIFIED POSTPROCEDURAL STATES: Chronic | ICD-10-CM

## 2022-04-20 DIAGNOSIS — Z96.641 PRESENCE OF RIGHT ARTIFICIAL HIP JOINT: Chronic | ICD-10-CM

## 2022-04-20 DIAGNOSIS — Z86.69 PERSONAL HISTORY OF OTHER DISEASES OF THE NERVOUS SYSTEM AND SENSE ORGANS: Chronic | ICD-10-CM

## 2022-04-20 DIAGNOSIS — M16.12 UNILATERAL PRIMARY OSTEOARTHRITIS, LEFT HIP: ICD-10-CM

## 2022-04-20 DIAGNOSIS — Z90.710 ACQUIRED ABSENCE OF BOTH CERVIX AND UTERUS: Chronic | ICD-10-CM

## 2022-04-20 LAB
ANION GAP SERPL CALC-SCNC: 7 MMOL/L — SIGNIFICANT CHANGE UP (ref 5–17)
BUN SERPL-MCNC: 12 MG/DL — SIGNIFICANT CHANGE UP (ref 7–23)
CALCIUM SERPL-MCNC: 8.5 MG/DL — SIGNIFICANT CHANGE UP (ref 8.4–10.5)
CHLORIDE SERPL-SCNC: 106 MMOL/L — SIGNIFICANT CHANGE UP (ref 96–108)
CO2 SERPL-SCNC: 23 MMOL/L — SIGNIFICANT CHANGE UP (ref 22–31)
CREAT SERPL-MCNC: 0.9 MG/DL — SIGNIFICANT CHANGE UP (ref 0.5–1.3)
EGFR: 68 ML/MIN/1.73M2 — SIGNIFICANT CHANGE UP
GLUCOSE SERPL-MCNC: 155 MG/DL — HIGH (ref 70–99)
HCT VFR BLD CALC: 33.5 % — LOW (ref 34.5–45)
HGB BLD-MCNC: 10.9 G/DL — LOW (ref 11.5–15.5)
MCHC RBC-ENTMCNC: 29.9 PG — SIGNIFICANT CHANGE UP (ref 27–34)
MCHC RBC-ENTMCNC: 32.5 GM/DL — SIGNIFICANT CHANGE UP (ref 32–36)
MCV RBC AUTO: 92 FL — SIGNIFICANT CHANGE UP (ref 80–100)
NRBC # BLD: 0 /100 WBCS — SIGNIFICANT CHANGE UP (ref 0–0)
PLATELET # BLD AUTO: 249 K/UL — SIGNIFICANT CHANGE UP (ref 150–400)
POTASSIUM SERPL-MCNC: 3.9 MMOL/L — SIGNIFICANT CHANGE UP (ref 3.5–5.3)
POTASSIUM SERPL-SCNC: 3.9 MMOL/L — SIGNIFICANT CHANGE UP (ref 3.5–5.3)
RBC # BLD: 3.64 M/UL — LOW (ref 3.8–5.2)
RBC # FLD: 12.1 % — SIGNIFICANT CHANGE UP (ref 10.3–14.5)
SODIUM SERPL-SCNC: 136 MMOL/L — SIGNIFICANT CHANGE UP (ref 135–145)
WBC # BLD: 13.89 K/UL — HIGH (ref 3.8–10.5)
WBC # FLD AUTO: 13.89 K/UL — HIGH (ref 3.8–10.5)

## 2022-04-20 PROCEDURE — 88305 TISSUE EXAM BY PATHOLOGIST: CPT | Mod: 26

## 2022-04-20 PROCEDURE — 99223 1ST HOSP IP/OBS HIGH 75: CPT

## 2022-04-20 PROCEDURE — 88311 DECALCIFY TISSUE: CPT | Mod: 26

## 2022-04-20 PROCEDURE — 27130 TOTAL HIP ARTHROPLASTY: CPT | Mod: LT

## 2022-04-20 DEVICE — SLEEVE BIOLOX DELTA OPTION SZ -3: Type: IMPLANTABLE DEVICE | Site: LEFT | Status: FUNCTIONAL

## 2022-04-20 DEVICE — SCREW ACET SZ 6.5X30MM: Type: IMPLANTABLE DEVICE | Site: LEFT | Status: FUNCTIONAL

## 2022-04-20 DEVICE — STEM FEM P2 LAT OFFSET SZ 12: Type: IMPLANTABLE DEVICE | Site: LEFT | Status: FUNCTIONAL

## 2022-04-20 DEVICE — LINER ACET EMPOWR HXE NEUT 32MM ALPHA E: Type: IMPLANTABLE DEVICE | Site: LEFT | Status: FUNCTIONAL

## 2022-04-20 DEVICE — CUP ACET EMPOWR CLUSTER 50MM ALPHA E: Type: IMPLANTABLE DEVICE | Site: LEFT | Status: FUNCTIONAL

## 2022-04-20 DEVICE — KWIRE THREADED 4.8MM 6/PK: Type: IMPLANTABLE DEVICE | Site: LEFT | Status: FUNCTIONAL

## 2022-04-20 DEVICE — CABLE CABLE-RDY PLT TROCH 1.8X635: Type: IMPLANTABLE DEVICE | Site: LEFT | Status: FUNCTIONAL

## 2022-04-20 DEVICE — HEAD FEM OPTION CERAMIC DELTA 32MM: Type: IMPLANTABLE DEVICE | Site: LEFT | Status: FUNCTIONAL

## 2022-04-20 DEVICE — BONE WAX 2.5GM: Type: IMPLANTABLE DEVICE | Site: LEFT | Status: FUNCTIONAL

## 2022-04-20 RX ORDER — ACETAMINOPHEN 500 MG
1000 TABLET ORAL ONCE
Refills: 0 | Status: COMPLETED | OUTPATIENT
Start: 2022-04-20 | End: 2022-04-20

## 2022-04-20 RX ORDER — TRANEXAMIC ACID 100 MG/ML
1000 INJECTION, SOLUTION INTRAVENOUS ONCE
Refills: 0 | Status: COMPLETED | OUTPATIENT
Start: 2022-04-20 | End: 2022-04-20

## 2022-04-20 RX ORDER — APIXABAN 2.5 MG/1
1 TABLET, FILM COATED ORAL
Qty: 0 | Refills: 0 | DISCHARGE

## 2022-04-20 RX ORDER — PANTOPRAZOLE SODIUM 20 MG/1
40 TABLET, DELAYED RELEASE ORAL
Refills: 0 | Status: DISCONTINUED | OUTPATIENT
Start: 2022-04-20 | End: 2022-04-21

## 2022-04-20 RX ORDER — SODIUM CHLORIDE 9 MG/ML
1000 INJECTION INTRAMUSCULAR; INTRAVENOUS; SUBCUTANEOUS
Refills: 0 | Status: DISCONTINUED | OUTPATIENT
Start: 2022-04-21 | End: 2022-04-21

## 2022-04-20 RX ORDER — CHLORHEXIDINE GLUCONATE 213 G/1000ML
1 SOLUTION TOPICAL ONCE
Refills: 0 | Status: COMPLETED | OUTPATIENT
Start: 2022-04-20 | End: 2022-04-20

## 2022-04-20 RX ORDER — APREPITANT 80 MG/1
40 CAPSULE ORAL ONCE
Refills: 0 | Status: COMPLETED | OUTPATIENT
Start: 2022-04-20 | End: 2022-04-20

## 2022-04-20 RX ORDER — ESCITALOPRAM OXALATE 10 MG/1
10 TABLET, FILM COATED ORAL DAILY
Refills: 0 | Status: DISCONTINUED | OUTPATIENT
Start: 2022-04-20 | End: 2022-04-21

## 2022-04-20 RX ORDER — ALPRAZOLAM 0.25 MG
0.25 TABLET ORAL
Refills: 0 | Status: DISCONTINUED | OUTPATIENT
Start: 2022-04-20 | End: 2022-04-21

## 2022-04-20 RX ORDER — ONDANSETRON 8 MG/1
4 TABLET, FILM COATED ORAL ONCE
Refills: 0 | Status: DISCONTINUED | OUTPATIENT
Start: 2022-04-20 | End: 2022-04-21

## 2022-04-20 RX ORDER — SODIUM CHLORIDE 9 MG/ML
500 INJECTION INTRAMUSCULAR; INTRAVENOUS; SUBCUTANEOUS ONCE
Refills: 0 | Status: COMPLETED | OUTPATIENT
Start: 2022-04-20 | End: 2022-04-20

## 2022-04-20 RX ORDER — CEFAZOLIN SODIUM 1 G
2000 VIAL (EA) INJECTION ONCE
Refills: 0 | Status: COMPLETED | OUTPATIENT
Start: 2022-04-20 | End: 2022-04-20

## 2022-04-20 RX ORDER — ATORVASTATIN CALCIUM 80 MG/1
10 TABLET, FILM COATED ORAL AT BEDTIME
Refills: 0 | Status: DISCONTINUED | OUTPATIENT
Start: 2022-04-20 | End: 2022-04-21

## 2022-04-20 RX ORDER — OMEPRAZOLE 10 MG/1
1 CAPSULE, DELAYED RELEASE ORAL
Qty: 30 | Refills: 1
Start: 2022-04-20 | End: 2022-06-18

## 2022-04-20 RX ORDER — DEXAMETHASONE 0.5 MG/5ML
8 ELIXIR ORAL ONCE
Refills: 0 | Status: COMPLETED | OUTPATIENT
Start: 2022-04-21 | End: 2022-04-21

## 2022-04-20 RX ORDER — POLYETHYLENE GLYCOL 3350 17 G/17G
17 POWDER, FOR SOLUTION ORAL AT BEDTIME
Refills: 0 | Status: DISCONTINUED | OUTPATIENT
Start: 2022-04-20 | End: 2022-04-21

## 2022-04-20 RX ORDER — FENTANYL CITRATE 50 UG/ML
25 INJECTION INTRAVENOUS
Refills: 0 | Status: DISCONTINUED | OUTPATIENT
Start: 2022-04-20 | End: 2022-04-21

## 2022-04-20 RX ORDER — FENTANYL CITRATE 50 UG/ML
50 INJECTION INTRAVENOUS
Refills: 0 | Status: DISCONTINUED | OUTPATIENT
Start: 2022-04-20 | End: 2022-04-21

## 2022-04-20 RX ORDER — MELOXICAM 15 MG/1
7.5 TABLET ORAL DAILY
Refills: 0 | Status: DISCONTINUED | OUTPATIENT
Start: 2022-04-21 | End: 2022-04-21

## 2022-04-20 RX ORDER — ACETAMINOPHEN 500 MG
2 TABLET ORAL
Qty: 0 | Refills: 0 | DISCHARGE
Start: 2022-04-20 | End: 2022-05-04

## 2022-04-20 RX ORDER — ONDANSETRON 8 MG/1
4 TABLET, FILM COATED ORAL EVERY 6 HOURS
Refills: 0 | Status: DISCONTINUED | OUTPATIENT
Start: 2022-04-20 | End: 2022-04-21

## 2022-04-20 RX ORDER — MELOXICAM 15 MG/1
1 TABLET ORAL
Qty: 21 | Refills: 0
Start: 2022-04-20 | End: 2022-05-10

## 2022-04-20 RX ORDER — SENNA PLUS 8.6 MG/1
2 TABLET ORAL AT BEDTIME
Refills: 0 | Status: DISCONTINUED | OUTPATIENT
Start: 2022-04-20 | End: 2022-04-21

## 2022-04-20 RX ORDER — LOSARTAN POTASSIUM 100 MG/1
50 TABLET, FILM COATED ORAL DAILY
Refills: 0 | Status: DISCONTINUED | OUTPATIENT
Start: 2022-04-22 | End: 2022-04-21

## 2022-04-20 RX ORDER — SODIUM CHLORIDE 9 MG/ML
1000 INJECTION, SOLUTION INTRAVENOUS
Refills: 0 | Status: DISCONTINUED | OUTPATIENT
Start: 2022-04-20 | End: 2022-04-21

## 2022-04-20 RX ORDER — OXYCODONE HYDROCHLORIDE 5 MG/1
5 TABLET ORAL
Refills: 0 | Status: DISCONTINUED | OUTPATIENT
Start: 2022-04-20 | End: 2022-04-21

## 2022-04-20 RX ORDER — MAGNESIUM HYDROXIDE 400 MG/1
30 TABLET, CHEWABLE ORAL DAILY
Refills: 0 | Status: DISCONTINUED | OUTPATIENT
Start: 2022-04-20 | End: 2022-04-21

## 2022-04-20 RX ORDER — CEFAZOLIN SODIUM 1 G
2000 VIAL (EA) INJECTION EVERY 8 HOURS
Refills: 0 | Status: COMPLETED | OUTPATIENT
Start: 2022-04-20 | End: 2022-04-20

## 2022-04-20 RX ORDER — POLYETHYLENE GLYCOL 3350 17 G/17G
17 POWDER, FOR SOLUTION ORAL
Qty: 0 | Refills: 0 | DISCHARGE
Start: 2022-04-20

## 2022-04-20 RX ORDER — APIXABAN 2.5 MG/1
5 TABLET, FILM COATED ORAL EVERY 12 HOURS
Refills: 0 | Status: DISCONTINUED | OUTPATIENT
Start: 2022-04-22 | End: 2022-04-21

## 2022-04-20 RX ORDER — ACETAMINOPHEN 500 MG
1000 TABLET ORAL EVERY 8 HOURS
Refills: 0 | Status: DISCONTINUED | OUTPATIENT
Start: 2022-04-20 | End: 2022-04-21

## 2022-04-20 RX ORDER — SENNA PLUS 8.6 MG/1
2 TABLET ORAL
Qty: 0 | Refills: 0 | DISCHARGE
Start: 2022-04-20

## 2022-04-20 RX ORDER — APIXABAN 2.5 MG/1
2.5 TABLET, FILM COATED ORAL EVERY 12 HOURS
Refills: 0 | Status: DISCONTINUED | OUTPATIENT
Start: 2022-04-21 | End: 2022-04-21

## 2022-04-20 RX ORDER — HYDROMORPHONE HYDROCHLORIDE 2 MG/ML
0.5 INJECTION INTRAMUSCULAR; INTRAVENOUS; SUBCUTANEOUS
Refills: 0 | Status: DISCONTINUED | OUTPATIENT
Start: 2022-04-20 | End: 2022-04-21

## 2022-04-20 RX ORDER — OXYCODONE HYDROCHLORIDE 5 MG/1
10 TABLET ORAL
Refills: 0 | Status: DISCONTINUED | OUTPATIENT
Start: 2022-04-20 | End: 2022-04-21

## 2022-04-20 RX ORDER — HYDROMORPHONE HYDROCHLORIDE 2 MG/ML
0.5 INJECTION INTRAMUSCULAR; INTRAVENOUS; SUBCUTANEOUS ONCE
Refills: 0 | Status: DISCONTINUED | OUTPATIENT
Start: 2022-04-20 | End: 2022-04-20

## 2022-04-20 RX ADMIN — SODIUM CHLORIDE 100 MILLILITER(S): 9 INJECTION, SOLUTION INTRAVENOUS at 14:24

## 2022-04-20 RX ADMIN — Medication 1000 MILLIGRAM(S): at 18:06

## 2022-04-20 RX ADMIN — Medication 0.25 MILLIGRAM(S): at 21:56

## 2022-04-20 RX ADMIN — OXYCODONE HYDROCHLORIDE 5 MILLIGRAM(S): 5 TABLET ORAL at 14:17

## 2022-04-20 RX ADMIN — ATORVASTATIN CALCIUM 10 MILLIGRAM(S): 80 TABLET, FILM COATED ORAL at 21:56

## 2022-04-20 RX ADMIN — Medication 1000 MILLIGRAM(S): at 21:59

## 2022-04-20 RX ADMIN — CHLORHEXIDINE GLUCONATE 1 APPLICATION(S): 213 SOLUTION TOPICAL at 06:40

## 2022-04-20 RX ADMIN — Medication 100 MILLIGRAM(S): at 23:44

## 2022-04-20 RX ADMIN — ESCITALOPRAM OXALATE 10 MILLIGRAM(S): 10 TABLET, FILM COATED ORAL at 14:22

## 2022-04-20 RX ADMIN — Medication 20 MILLIGRAM(S): at 14:20

## 2022-04-20 RX ADMIN — SODIUM CHLORIDE 75 MILLILITER(S): 9 INJECTION, SOLUTION INTRAVENOUS at 10:43

## 2022-04-20 RX ADMIN — SODIUM CHLORIDE 500 MILLILITER(S): 9 INJECTION INTRAMUSCULAR; INTRAVENOUS; SUBCUTANEOUS at 10:42

## 2022-04-20 RX ADMIN — SODIUM CHLORIDE 500 MILLILITER(S): 9 INJECTION INTRAMUSCULAR; INTRAVENOUS; SUBCUTANEOUS at 14:24

## 2022-04-20 RX ADMIN — Medication 100 MILLIGRAM(S): at 15:28

## 2022-04-20 RX ADMIN — Medication 400 MILLIGRAM(S): at 14:17

## 2022-04-20 RX ADMIN — Medication 1000 MILLIGRAM(S): at 21:56

## 2022-04-20 RX ADMIN — APREPITANT 40 MILLIGRAM(S): 80 CAPSULE ORAL at 06:39

## 2022-04-20 RX ADMIN — OXYCODONE HYDROCHLORIDE 5 MILLIGRAM(S): 5 TABLET ORAL at 14:47

## 2022-04-20 NOTE — PHYSICAL THERAPY INITIAL EVALUATION ADULT - ADDITIONAL COMMENTS
Pt has no steps to enter house. Pt has 13 steps inside the house to negotiate with HR. Pt is legally blind in both eyes.

## 2022-04-20 NOTE — DISCHARGE NOTE PROVIDER - CARE PROVIDER_API CALL
Chito Contreras)  Orthopedics  [address]  [place] [address]  Phone: [phone]  Fax: [phone]  Follow Up Time: 2 weeks   Chito Contreras)  Orthopedics  [address]  [place] [address]  Phone: [phone]  Fax: [phone]  Established Patient  Scheduled Appointment: 05/05/2022 09:00 AM

## 2022-04-20 NOTE — DISCHARGE NOTE NURSING/CASE MANAGEMENT/SOCIAL WORK - NSDCFUADDAPPT_GEN_ALL_CORE_FT
- Call your doctor if you experience:  • An increase in pain not controlled by pain medication or change in activity or  position.  • Temperature greater than 101° F.  • Redness, increased swelling or foul smelling drainage from or around the  incision.  • Numbness, tingling or a change in color or temperature of the operative leg.  • Call your doctor immediately if you experience chest pain, shortness of breath or calf pain.   Follow up Dr. Contreras office x  2 weeks  may 5, 200pm   and june 21, 9:30 am call office to confirm  Follow up primary care MD

## 2022-04-20 NOTE — DISCHARGE NOTE PROVIDER - PROVIDER TOKENS
PROVIDER:[TOKEN:[[id number]],FOLLOWUP:[2 weeks]] PROVIDER:[TOKEN:[[id number]],SCHEDULEDAPPT:[05/05/2022],SCHEDULEDAPPTTIME:[09:00 AM],ESTABLISHEDPATIENT:[T]]

## 2022-04-20 NOTE — DISCHARGE NOTE NURSING/CASE MANAGEMENT/SOCIAL WORK - NSSCCONTNUM_GEN_ALL_CORE
Please contact the home care agency at  [phone] or  [phone] if you have not heard from them by 12 noon on the day after your hospital discharge.

## 2022-04-20 NOTE — PHYSICAL THERAPY INITIAL EVALUATION ADULT - RANGE OF MOTION EXAMINATION, REHAB EVAL
Left LE not tested due to surgery/bilateral upper extremity ROM was WFL (within functional limits)/Right LE ROM was WFL (within functional limits)

## 2022-04-20 NOTE — DISCHARGE NOTE PROVIDER - NSDCFUADDAPPT_GEN_ALL_CORE_FT
- Call your doctor if you experience:  • An increase in pain not controlled by pain medication or change in activity or  position.  • Temperature greater than 101° F.  • Redness, increased swelling or foul smelling drainage from or around the  incision.  • Numbness, tingling or a change in color or temperature of the operative leg.  • Call your doctor immediately if you experience chest pain, shortness of breath or calf pain.   Follow up Dr. Contreras office x  2 weeks  may 5, 200pm   and june 21, 9:30 am call office to confirm  Follow up primary care MD    Follow up Dr. Contreras office x  2 weeks  may 5, 200pm   and june 21, 9:30 am call office to confirm  It is advisable to follow up w/ your pcp in 2-3 weeks to be sure there are no underlying problems.

## 2022-04-20 NOTE — PHYSICAL THERAPY INITIAL EVALUATION ADULT - PERTINENT HX OF CURRENT PROBLEM, REHAB EVAL
70 yo female reports left hip pain with radiation to groin for 1 year.  Pain worst with abduction and adduction rating 10/10.

## 2022-04-20 NOTE — PHYSICAL THERAPY INITIAL EVALUATION ADULT - GAIT DEVIATIONS NOTED, PT EVAL
decreased garret/increased time in double stance/decreased step length/decreased stride length/decreased weight-shifting ability

## 2022-04-20 NOTE — BRIEF OPERATIVE NOTE - NSICDXBRIEFPROCEDURE_GEN_ALL_CORE_FT
PROCEDURES:  Total replacement of left hip joint by anterior approach 20-Apr-2022 10:00:52  Shamar Miller

## 2022-04-20 NOTE — CONSULT NOTE ADULT - SUBJECTIVE AND OBJECTIVE BOX
Information Obtained from:  EHR, Physical Chart, Patient at bedside     HPI:  72yo F admitted s/p LEFT THR today.  Has had 1 year of left hip pain with radiation into groin, up to 10/10 with activity , affecting quality of life.  No fevers, chills, sweats, dizziness, HA, changes in vision, cp, palpitations, sob, persistent cough, n/v/d, abd pain, dysuria, focal weakness, or calf pain.      REVIEW OF SYSTEMS:  see HPI, others negative    PAST MEDICAL & SURGICAL HISTORY:  Primary osteoarthritis of right hip    Hypertension    Hypercholesterolemia    Arthritis    Muscle cramps    Legally blind  Bilaterally - notes L&lt; 20% vision    Anxiety and depression    Optic nerve drusen, bilateral    Osteoarthritis of left hip    COVID-19 vaccine series completed    History of cardiac radiofrequency ablation  2021    History of atrial fibrillation    Status post placement of implantable loop recorder    H/O cataract  bilateral, 2011    S/P hysterectomy  2012    Status post Mohs surgery  after 2010&#x27;s    History of right hip replacement  2016    History of breast lump removal  left, age 31    SOCIAL HISTORY:  Lives: with spouse  ETOH consumption: monthly or less  Smoking Hx: 40PY, quit 12ya  Illicit Drug Use:  None    Allergies    sulfa drugs (Unknown)    Intolerances    ramipril (Other)     Home Medications:  ALPRAZolam 0.25 mg oral tablet: 1 tab(s) orally 4 times a day, As Needed - for anxiety (20 Apr 2022 06:30)  atorvastatin 10 mg oral tablet: 1 tab(s) orally once a day (at bedtime) (20 Apr 2022 06:30)  dicyclomine 20 mg oral tablet: orally 3 times a day (20 Apr 2022 06:30)  Eliquis 5 mg oral tablet: 1 tab(s) orally 2 times a day (20 Apr 2022 06:30)  Lexapro 10 mg oral tablet: 1 tab(s) orally once a day (at bedtime) (20 Apr 2022 06:30)  losartan 50 mg oral tablet: 1 tab(s) orally once a day (at bedtime) (20 Apr 2022 06:30)  Lunesta 3 mg oral tablet: 1 tab(s) orally once a day (at bedtime) (20 Apr 2022 06:30)  Vitamin D3 1000 intl units oral capsule: 1 cap(s) orally once a day (at bedtime) (20 Apr 2022 06:30)    FAMILY HISTORY:  Family history of heart attack (Father)    Family history of colon cancer (Sibling)    PHYSICAL EXAM:  Vital Signs Last 24 Hrs  T(C): 36.3 (20 Apr 2022 09:59), Max: 36.3 (20 Apr 2022 06:32)  T(F): 97.4 (20 Apr 2022 09:59), Max: 97.4 (20 Apr 2022 09:59)  HR: 65 (20 Apr 2022 10:14) (65 - 71)  BP: 114/60 (20 Apr 2022 10:14) (106/57 - 114/60)  BP(mean): --  RR: 12 (20 Apr 2022 10:14) (12 - 15)  SpO2: 98% (20 Apr 2022 10:14) (98% - 100%)    GENERAL: NAD, well-groomed, well-developed,  oriented x 3, fluent and coherent speech  EYES: conjunctiva and sclera clear  ENMT: No tonsillar erythema, exudates, or enlargement; Moist mucous membranes, No lesions on oral mucosa  NECK: Supple, No JVD, No Cervical LAD, No thyromegaly, No thyroid nodules  NERVOUS SYSTEM:  No facial droop  CHEST/LUNG: Clear to auscultation bilaterally; No rales, rhonchi, wheezing, or rubs  HEART: Regular rate and rhythm; No murmurs, rubs, or gallops  ABDOMEN: Soft, Nontender, Nondistended, Bowel sounds present, No palpable masses or organomegaly, No bruits  EXTREMITIES:  2+ Peripheral Pulses, No clubbing, cyanosis, or edema  INCISION:  Dressing clean/dry/intact    LABS:  hgba1c 5.5    EKG (was personally reviewed):   yes, NSR

## 2022-04-20 NOTE — DISCHARGE NOTE PROVIDER - NSDCMRMEDTOKEN_GEN_ALL_CORE_FT
ALPRAZolam 0.25 mg oral tablet: 1 tab(s) orally 4 times a day, As Needed - for anxiety  atorvastatin 10 mg oral tablet: 1 tab(s) orally once a day (at bedtime)  dicyclomine 20 mg oral tablet: orally 3 times a day  Eliquis 5 mg oral tablet: 1 tab(s) orally 2 times a day  Lexapro 10 mg oral tablet: 1 tab(s) orally once a day (at bedtime)  losartan 50 mg oral tablet: 1 tab(s) orally once a day (at bedtime)  Lunesta 3 mg oral tablet: 1 tab(s) orally once a day (at bedtime)  Vitamin D3 1000 intl units oral capsule: 1 cap(s) orally once a day (at bedtime)   acetaminophen 500 mg oral tablet: 2 tab(s) orally every 8 hours  ALPRAZolam 0.25 mg oral tablet: 1 tab(s) orally 4 times a day, As Needed - for anxiety  apixaban 2.5 mg oral tablet: 1 tab(s) orally once  at 10 pm on 4/21/22   atorvastatin 10 mg oral tablet: 1 tab(s) orally once a day (at bedtime)  dicyclomine 20 mg oral tablet: orally 3 times a day  Eliquis 5 mg oral tablet: 1 tab(s) orally 2 times a day. Start on Friday, 4/22/22.  Lexapro 10 mg oral tablet: 1 tab(s) orally once a day (at bedtime)  losartan 50 mg oral tablet: 1 tab(s) orally once a day (at bedtime)  Lunesta 3 mg oral tablet: 1 tab(s) orally once a day (at bedtime)  meloxicam 7.5 mg oral tablet: 1 tab(s) orally once a day   omeprazole 20 mg oral delayed release capsule: 1 cap orally once a day   oxyCODONE 5 mg oral tablet: 1-2 tab(s) orally every 4 hours, As Needed -Moderate to severe Pain MDD:6   [id number]  polyethylene glycol 3350 oral powder for reconstitution: 17 gram(s) orally once a day (at bedtime), As Needed  senna oral tablet: 2 tab(s) orally once a day (at bedtime), As Needed  Vitamin D3 1000 intl units oral capsule: 1 cap(s) orally once a day (at bedtime)

## 2022-04-20 NOTE — BRIEF OPERATIVE NOTE - NSICDXBRIEFPREOP_GEN_ALL_CORE_FT
PRE-OP DIAGNOSIS:  Degenerative joint disease (DJD) of hip 20-Apr-2022 10:02:36 left Shamar Miller

## 2022-04-20 NOTE — DISCHARGE NOTE PROVIDER - NSDCFUSCHEDAPPT_GEN_ALL_CORE_FT
MIKI YBARRA ; 05/05/2022 ; [address]  MIKI YBARRA ; 06/21/2022 ; [address] Patient/Caregiver provided printed discharge information.

## 2022-04-20 NOTE — PHYSICAL THERAPY INITIAL EVALUATION ADULT - PRECAUTIONS/LIMITATIONS, REHAB EVAL
Anterior THP/fall precautions/left hip precautions/surgical precautions Anterior THP, legally blind B/L eyes/fall precautions/left hip precautions/surgical precautions/vision precautions

## 2022-04-20 NOTE — DISCHARGE NOTE PROVIDER - NSDCCPTREATMENT_GEN_ALL_CORE_FT
PRINCIPAL PROCEDURE  Procedure: Total arthroplasty, hip, anterior approach  Findings and Treatment: left       PRINCIPAL PROCEDURE  Procedure: Total arthroplasty, hip, anterior approach  Findings and Treatment: Severe DJD left hip

## 2022-04-20 NOTE — CONSULT NOTE ADULT - ASSESSMENT
POD#0 s/p LEFT THR  - Pain control  - sulfa allergy, mobic instead of celebrex  - Bowel regimen  - PT/OT  - Incentive spirometry  - VTE PPx - Eliquis    Atrial Fibrillation, ? Paroxsymal   - eliquis 2.5mg BID on POD#1  - then resume 5mg BID on POD#2    HTN/HLD  - resume statin on POD#0  - resume losartan on POD#2

## 2022-04-20 NOTE — DISCHARGE NOTE NURSING/CASE MANAGEMENT/SOCIAL WORK - NSSCNAMETXT_GEN_ALL_CORE
[facility] (formerly [facility])   Phone: [phone]

## 2022-04-20 NOTE — DISCHARGE NOTE PROVIDER - NSDCFUADDINST_GEN_ALL_CORE_FT
For Constipation :   • Increase your water intake. Drink at least 8 glasses of water daily.  • Try adding fiber to your diet by eating fruits, vegetables and foods that are rich in grains.  • If you do experience constipation, you may take an over-the-counter stool softener/laxative such as Amber Colace, Senekot or  Milk of Magnesia.  - Call your doctor if you experience:  • An increase in pain not controlled by pain medication or change in activity or  position.  • Temperature greater than 101° F.  • Redness, increased swelling or foul smelling drainage from or around the  incision.  • Numbness, tingling or a change in color or temperature of the operative leg.  • Call your doctor immediately if you experience chest pain, shortness of breath or calf pain.

## 2022-04-20 NOTE — DISCHARGE NOTE NURSING/CASE MANAGEMENT/SOCIAL WORK - NSDCPEFALRISK_GEN_ALL_CORE
For information on Fall & Injury Prevention, visit: [email or web address] OR  [email or web address] OR  https://www.cdc.gov/steadi/patient.html

## 2022-04-20 NOTE — BRIEF OPERATIVE NOTE - NSICDXBRIEFPOSTOP_GEN_ALL_CORE_FT
POST-OP DIAGNOSIS:  Degenerative joint disease (DJD) of hip 20-Apr-2022 10:03:02 left Shamar Miller

## 2022-04-20 NOTE — DISCHARGE NOTE PROVIDER - INSTRUCTIONS
For Constipation :   • Increase your water intake. Drink at least 8 glasses of water daily.  • Try adding fiber to your diet by eating fruits, vegetables and foods that are rich in grains.  • If you do experience constipation, you may take an over-the-counter stool softener/laxative such as Amber Colace, Senekot, miralax or  Milk of Magnesia.

## 2022-04-20 NOTE — DISCHARGE NOTE PROVIDER - HOSPITAL COURSE
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  Patient went to Pre-Surgical Testing at [facility] and was medically cleared to undergo elective procedure.    The patient underwent a Total replacement of left hip joint by anterior approach     by Chito Contreras on 04-20-22.   No operative or pebbles-operative complications arose during patients hospital course.    Patient received antibiotic according to SCIP guidelines for infection prevention.   Eliquis was given for DVT prophylaxis.    Anesthesia, Medical Hospitalist, Physical Therapy and Occupational Therapy were consulted. Patient is stable for discharge with a good prognosis.  Appropriate discharge instructions and medications are provided in this document. This is 71y Female patient was admitted to [facility] with a history Degenerative joint disease (DJD) of the left hip.  Patient went to Pre-Surgical Testing at [facility] and was medically cleared to undergo elective procedure.    The patient underwent a Total replacement of left hip joint by anterior approach by Chito Contreras on 04-20-22.   No operative or pebbles-operative complications arose during patients hospital course.    Patient received antibiotic according to SCIP guidelines for infection prevention.   Eliquis 2.5 -> 5 mg every 12 hrs was given for DVT prophylaxis (hx afib).    Anesthesia, Medical Hospitalist, Physical Therapy and Occupational Therapy were consulted. Patient is stable for discharge with a good prognosis.  Appropriate discharge instructions and medications are provided in this document.

## 2022-04-20 NOTE — DISCHARGE NOTE PROVIDER - NSDCCPCAREPLAN_GEN_ALL_CORE_FT
PRINCIPAL DISCHARGE DIAGNOSIS  Diagnosis: Degenerative joint disease (DJD) of hip  Assessment and Plan of Treatment: left  Physical Therapy  /Occupational Therapy  Anterior Total Hip Protocol  - Ambulation  - Transfers  - Stairs &  Activities of daily living (ADLs)  Anterior THR precautions for 4 weeks   - No straight leg raise  - No external rotation of hip when extended-standing or lying flat  - No hyperextension of hip when standing (kickback)  - Ice packs to hip following  Physical Therapy       PRINCIPAL DISCHARGE DIAGNOSIS  Diagnosis: Degenerative joint disease (DJD) of hip  Assessment and Plan of Treatment: left  Physical Therapy  /Occupational Therapy  Anterior Total Hip Protocol  - Ambulation  - Transfers  - Stairs &  Activities of daily living (ADLs)  Anterior THR precautions for 4 weeks   - No straight leg raise  - No external rotation of hip when extended-standing or lying flat  - No hyperextension of hip when standing (kickback)  - Ice packs to hip following  Physical Therapy  Silverlon dressing is waterproof.  You may shower, but do not aim shower stream at surgical site. Pat dry after shower.   Remove Silverlon dressing on postop day #7. May shower after Silverlon removal.  Keep incision clean. DO NOT APPLY ANYTHING to incision site (salves/ointments/creams).  May shower.  Do not scrub incision site. Pat dry after shower. Prineo dressing will be removed at surgeon's office during first post-op appointment, 2 weeks after surgery.

## 2022-04-20 NOTE — DISCHARGE NOTE NURSING/CASE MANAGEMENT/SOCIAL WORK - PATIENT PORTAL LINK FT
You can access the FollowMyHealth Patient Portal offered by [facility] by registering at the following website: [email or web address]. By joining [facility]’s FollowMyHealth portal, you will also be able to view your health information using other applications (apps) compatible with our system.

## 2022-04-21 VITALS
HEART RATE: 65 BPM | RESPIRATION RATE: 18 BRPM | OXYGEN SATURATION: 98 % | TEMPERATURE: 98 F | SYSTOLIC BLOOD PRESSURE: 114 MMHG | DIASTOLIC BLOOD PRESSURE: 63 MMHG

## 2022-04-21 LAB
ANION GAP SERPL CALC-SCNC: 5 MMOL/L — SIGNIFICANT CHANGE UP (ref 5–17)
BUN SERPL-MCNC: 13 MG/DL — SIGNIFICANT CHANGE UP (ref 7–23)
CALCIUM SERPL-MCNC: 8.7 MG/DL — SIGNIFICANT CHANGE UP (ref 8.4–10.5)
CHLORIDE SERPL-SCNC: 105 MMOL/L — SIGNIFICANT CHANGE UP (ref 96–108)
CO2 SERPL-SCNC: 25 MMOL/L — SIGNIFICANT CHANGE UP (ref 22–31)
CREAT SERPL-MCNC: 0.67 MG/DL — SIGNIFICANT CHANGE UP (ref 0.5–1.3)
EGFR: 93 ML/MIN/1.73M2 — SIGNIFICANT CHANGE UP
GLUCOSE SERPL-MCNC: 120 MG/DL — HIGH (ref 70–99)
HCT VFR BLD CALC: 30.7 % — LOW (ref 34.5–45)
HGB BLD-MCNC: 10.1 G/DL — LOW (ref 11.5–15.5)
MCHC RBC-ENTMCNC: 30.5 PG — SIGNIFICANT CHANGE UP (ref 27–34)
MCHC RBC-ENTMCNC: 32.9 GM/DL — SIGNIFICANT CHANGE UP (ref 32–36)
MCV RBC AUTO: 92.7 FL — SIGNIFICANT CHANGE UP (ref 80–100)
NRBC # BLD: 0 /100 WBCS — SIGNIFICANT CHANGE UP (ref 0–0)
PLATELET # BLD AUTO: 223 K/UL — SIGNIFICANT CHANGE UP (ref 150–400)
POTASSIUM SERPL-MCNC: 4 MMOL/L — SIGNIFICANT CHANGE UP (ref 3.5–5.3)
POTASSIUM SERPL-SCNC: 4 MMOL/L — SIGNIFICANT CHANGE UP (ref 3.5–5.3)
RBC # BLD: 3.31 M/UL — LOW (ref 3.8–5.2)
RBC # FLD: 12.2 % — SIGNIFICANT CHANGE UP (ref 10.3–14.5)
SODIUM SERPL-SCNC: 135 MMOL/L — SIGNIFICANT CHANGE UP (ref 135–145)
WBC # BLD: 13.9 K/UL — HIGH (ref 3.8–10.5)
WBC # FLD AUTO: 13.9 K/UL — HIGH (ref 3.8–10.5)

## 2022-04-21 PROCEDURE — 94664 DEMO&/EVAL PT USE INHALER: CPT

## 2022-04-21 PROCEDURE — 99239 HOSP IP/OBS DSCHRG MGMT >30: CPT

## 2022-04-21 PROCEDURE — 97165 OT EVAL LOW COMPLEX 30 MIN: CPT

## 2022-04-21 PROCEDURE — 27130 TOTAL HIP ARTHROPLASTY: CPT

## 2022-04-21 PROCEDURE — 36415 COLL VENOUS BLD VENIPUNCTURE: CPT

## 2022-04-21 PROCEDURE — C1776: CPT

## 2022-04-21 PROCEDURE — 97110 THERAPEUTIC EXERCISES: CPT

## 2022-04-21 PROCEDURE — 97535 SELF CARE MNGMENT TRAINING: CPT

## 2022-04-21 PROCEDURE — C1713: CPT

## 2022-04-21 PROCEDURE — 76000 FLUOROSCOPY <1 HR PHYS/QHP: CPT

## 2022-04-21 PROCEDURE — 85027 COMPLETE CBC AUTOMATED: CPT

## 2022-04-21 PROCEDURE — 97161 PT EVAL LOW COMPLEX 20 MIN: CPT

## 2022-04-21 PROCEDURE — 97116 GAIT TRAINING THERAPY: CPT

## 2022-04-21 PROCEDURE — 80048 BASIC METABOLIC PNL TOTAL CA: CPT

## 2022-04-21 PROCEDURE — 97530 THERAPEUTIC ACTIVITIES: CPT

## 2022-04-21 PROCEDURE — 88311 DECALCIFY TISSUE: CPT

## 2022-04-21 PROCEDURE — C1889: CPT

## 2022-04-21 PROCEDURE — 88305 TISSUE EXAM BY PATHOLOGIST: CPT

## 2022-04-21 RX ORDER — APIXABAN 2.5 MG/1
1 TABLET, FILM COATED ORAL
Qty: 1 | Refills: 0
Start: 2022-04-21

## 2022-04-21 RX ORDER — OXYCODONE HYDROCHLORIDE 5 MG/1
1 TABLET ORAL
Qty: 42 | Refills: 0
Start: 2022-04-21 | End: 2022-04-27

## 2022-04-21 RX ADMIN — PANTOPRAZOLE SODIUM 40 MILLIGRAM(S): 20 TABLET, DELAYED RELEASE ORAL at 04:58

## 2022-04-21 RX ADMIN — OXYCODONE HYDROCHLORIDE 5 MILLIGRAM(S): 5 TABLET ORAL at 10:03

## 2022-04-21 RX ADMIN — APIXABAN 2.5 MILLIGRAM(S): 2.5 TABLET, FILM COATED ORAL at 09:05

## 2022-04-21 RX ADMIN — Medication 1000 MILLIGRAM(S): at 13:38

## 2022-04-21 RX ADMIN — Medication 20 MILLIGRAM(S): at 12:06

## 2022-04-21 RX ADMIN — Medication 1000 MILLIGRAM(S): at 05:40

## 2022-04-21 RX ADMIN — OXYCODONE HYDROCHLORIDE 5 MILLIGRAM(S): 5 TABLET ORAL at 09:13

## 2022-04-21 RX ADMIN — ESCITALOPRAM OXALATE 10 MILLIGRAM(S): 10 TABLET, FILM COATED ORAL at 12:06

## 2022-04-21 RX ADMIN — Medication 101.6 MILLIGRAM(S): at 05:40

## 2022-04-21 RX ADMIN — MELOXICAM 7.5 MILLIGRAM(S): 15 TABLET ORAL at 09:05

## 2022-04-21 RX ADMIN — Medication 20 MILLIGRAM(S): at 04:58

## 2022-04-21 RX ADMIN — Medication 0.25 MILLIGRAM(S): at 09:13

## 2022-04-21 NOTE — OCCUPATIONAL THERAPY INITIAL EVALUATION ADULT - ADDITIONAL COMMENTS
Lives with her . no steps to enter. flight of 13 inside. Has a low tub with GB. Has a Rw and raised toilet seat.  Pt is legally blind

## 2022-04-21 NOTE — PROGRESS NOTE ADULT - ASSESSMENT
POD#1 s/p LEFT THR  - Pain control  - sulfa allergy, mobic instead of celebrex  - Bowel regimen  - PT/OT  - Incentive spirometry  - VTE PPx - Eliquis  - stable for discharge from medical perspective    Atrial Fibrillation, ? Paroxsymal   - eliquis 2.5mg BID on POD#1  - then resume 5mg BID on POD#2    HTN/HLD  - resume statin on POD#0  - resume losartan on POD#2

## 2022-04-21 NOTE — PROGRESS NOTE ADULT - REASON FOR ADMISSION
Patient is a 71y old  Female who presents with a chief complaint of left hip pain--for left ant RAO (21 Apr 2022 09:18)

## 2022-04-21 NOTE — PROGRESS NOTE ADULT - SUBJECTIVE AND OBJECTIVE BOX
Orthopaedic Post Op Note    Procedure: Left Anterior THR  Surgeon: Chito Contreras     71y Female comfortable, without complaints. Ambulated a few steps with PT. Tolerating diet. Reported pain score = 0  Denies N/V, CP, SOB, numbness/tingling of extremities.    PE:  Vital Signs Last 24 Hrs  T(C): 36.9 (20 Apr 2022 13:01), Max: 36.9 (20 Apr 2022 13:01)  T(F): 98.4 (20 Apr 2022 13:01), Max: 98.4 (20 Apr 2022 13:01)  HR: 76 (20 Apr 2022 13:01) (65 - 77)  BP: 111/55 (20 Apr 2022 13:01) (99/76 - 129/95)  RR: 19 (20 Apr 2022 13:01) (12 - 19)  SpO2: 100% (20 Apr 2022 13:01) (98% - 100%)  General: Pt alert and oriented   Lungs: + BS CTA bilaterally  Heart: +S1 & S2 heard, RRR  Abd: + BS heard, soft, NT, ND  Left Hip Silverlon Dressing: C/D/I   Bilateral LEs:  Motor:   5/5 dorsiflexion, plantarflexion, EHL  Sensation intact to LT  2+ DP Pulses  SCDs in place    A/P: 71y Female stable POD#0 s/p Left Anterior THR   -  Hicks catheter for urinary retention; normally has urgency and frequency - will leave hicks until am  -  Acetaminophen, Meloxicam, Oxycodone, Dilaudid for Pain Control   - DVT ppx: Eliquis 2.5mg q 12 h x 2 doses, then Eliquis 5mg q 12h; Aspirin 81mg daily  - Amber op IV abx: Ancef  - Meloxicam for HO ppx  - Anterior total hip precautions  - PT, OT per protocol  - F/U AM Labs  DCP = home tomorrow pending PT, OT, medical clearance        
INTERVAL HPI/OVERNIGHT EVENTS:   Patient seen and examined.  Eating, voiding.  No fevers, chills, sweats, dizziness, HA, changes in vision, cp, palpitations, sob, persistent cough, n/v/d, abd pain, dysuria, focal weakness, or calf pain.     REVIEW OF SYSTEMS:  See HPI,  all others negative    PHYSICAL EXAM:  Vital Signs Last 24 Hrs  T(C): 36.8 (21 Apr 2022 07:42), Max: 37.1 (21 Apr 2022 03:04)  T(F): 98.3 (21 Apr 2022 07:42), Max: 98.7 (21 Apr 2022 03:04)  HR: 65 (21 Apr 2022 07:42) (65 - 82)  BP: 114/63 (21 Apr 2022 07:42) (92/54 - 117/60)  BP(mean): --  RR: 18 (21 Apr 2022 07:42) (12 - 19)  SpO2: 98% (21 Apr 2022 07:42) (92% - 100%)    GENERAL: NAD, well-groomed, well-developed, awake, alert, oriented x 3, fluent and coherent speech, sitting up in chair  EYES: EOMI, PERRLA, conjunctiva and sclera clear  NECK: Supple, No JVD, No Cervical LAD   NERVOUS SYSTEM:  Good concentration; Moving all 4 extremities against gravity; No gross sensory deficits, No facial droop  CHEST/LUNG: Clear to auscultation bilaterally with good air entry; No rales, rhonchi, wheezing, or rubs  HEART: Regular rate and rhythm; No murmurs, rubs, or gallops  ABDOMEN: Soft, Nontender, Nondistended, Bowel sounds present, No palpable masses or organomegaly, No bruits  EXTREMITIES:  2+ Peripheral Pulses, No clubbing, cyanosis, or edema, no calf tenderness in either leg    Diagnostic Testing:                        10.1   13.90 )-----------( 223      ( 21 Apr 2022 06:43 )             30.7     21 Apr 2022 06:43    135    |  105    |  13     ----------------------------<  120    4.0     |  25     |  0.67     Ca    8.7        21 Apr 2022 06:43               
Procedure:  Left Anterior THR  POD#: 1    S: Pt without complaints. No SOB,CP, N/V. Tolerated Fluids / Diet well.   Pain comfortable on Interval Rx  + Tylenol + Celebrex. No BM yet  + flatus, No abdominal pain.  Took oxy 5 x1 .  Says she has tingling/hyperesthesia left thigh to touch.  hicks was removed this morning.  Assisted pt to commode to void.  Pain Rx:   acetaminophen     Tablet .. 1000 milliGRAM(s) Oral every 8 hours  ALPRAZolam 0.25 milliGRAM(s) Oral four times a day PRN  escitalopram 10 milliGRAM(s) Oral daily  fentaNYL    Injectable 25 MICROGram(s) IV Push every 10 minutes PRN  fentaNYL    Injectable 50 MICROGram(s) IV Push every 10 minutes PRN  HYDROmorphone  Injectable 0.5 milliGRAM(s) IV Push every 3 hours PRN  meloxicam 7.5 milliGRAM(s) Oral daily  ondansetron Injectable 4 milliGRAM(s) IV Push every 6 hours PRN  ondansetron Injectable 4 milliGRAM(s) IV Push once PRN  oxyCODONE    IR 5 milliGRAM(s) Oral every 3 hours PRN  oxyCODONE    IR 10 milliGRAM(s) Oral every 3 hours PRN    O: General: Pt Alert and oriented, On exam NAD,   VS: Vital Signs Last 24 Hrs  T(C): 36.8 (21 Apr 2022 07:42), Max: 37.1 (21 Apr 2022 03:04)  T(F): 98.3 (21 Apr 2022 07:42), Max: 98.7 (21 Apr 2022 03:04)  HR: 65 (21 Apr 2022 07:42) (65 - 82)  BP: 114/63 (21 Apr 2022 07:42) (92/54 - 129/95)  BP(mean): --  RR: 18 (21 Apr 2022 07:42) (12 - 19)  SpO2: 98% (21 Apr 2022 07:42) (92% - 100%)  Heart: RRR No murmur  Lungs: BS clear bilat.  Abdomen: Soft; no distention, benign exam    Ext: Left Ant. Hip:silverlon clean and dry  Neurologic: Has sensation bilat. feet & toes ;  Full AROM bilat feet & toes. EHL / AT  = Bilat: 5/5 ; Sensation Present mid lateral thigh  Vascular: Feet toes warm, pink. DP = 1+. Calves soft ; w/o tenderness bilat..  VTEP: On Bilat. Venodynes +   apixaban 2.5 milliGRAM(s) Oral every 12 hours    H.O Prophylaxis: Celebrex 200mg BID - Goal 21 Days   Activity in PT yesterday: Walked 50'                           10.1   13.90 )-----------( 223      ( 21 Apr 2022 06:43 )             30.7     04-21    135  |  105  |  13  ----------------------------<  120<H>  4.0   |  25  |  0.67    Ca    8.7      21 Apr 2022 06:43        Hospitalist input noted    Primary Orthopedic Assessment:  • Stable from Orthopedic perspective  • Neuro motor exam stable:   • Labs: stable      Plan:   • Continue:  PT/OT/Weightbearing as tolerated with assistance of a walker/Anterior THR precautions/Ice to hip/          Incentive spirometry encouraged / Celebrex for HO PPX  • Continue DVT prophylaxis as prescribed, including use of compression devices and ankle pumps  • Continue Pain Rx  • Anterior hip precautions reviewed with patient  • Plans per Medicine   • Discharge planning – anticipated discharge is Home D/C with home care & home PT when medically stable & cleared by PT/OT--today

## 2022-05-05 ENCOUNTER — APPOINTMENT (OUTPATIENT)
Dept: ORTHOPEDIC SURGERY | Facility: CLINIC | Age: 72
End: 2022-05-05
Payer: MEDICARE

## 2022-05-05 VITALS — DIASTOLIC BLOOD PRESSURE: 68 MMHG | SYSTOLIC BLOOD PRESSURE: 104 MMHG | HEART RATE: 71 BPM | TEMPERATURE: 97.2 F

## 2022-05-05 PROCEDURE — 73502 X-RAY EXAM HIP UNI 2-3 VIEWS: CPT | Mod: LT

## 2022-05-05 PROCEDURE — 99024 POSTOP FOLLOW-UP VISIT: CPT

## 2022-05-05 NOTE — HISTORY OF PRESENT ILLNESS
[___ Weeks Post Op] : [unfilled] weeks post op [4] : the patient reports pain that is 4/10 in severity [Swelling] : swollen [Neuro Intact] : an unremarkable neurological exam [Vascular Intact] : ~T peripheral vascular exam normal [Negative Sarahy's] : maneuvers demonstrated a negative Sarahy's sign [Xray (Date:___)] : [unfilled] Xray -  [Hardware in Good Position] : hardware in good position [No Obvious Fractures] : no obvious fractures [Good Overall Alignment] : good overall alignment [Doing Well] : is doing well [Excellent Pain Control] : has excellent pain control [No Sign of Infection] : is showing no signs of infection [Chills] : no chills [Constipation] : no constipation [Diarrhea] : no diarrhea [Dysuria] : no dysuria [Fever] : no fever [Nausea] : no nausea [Vomiting] : no vomiting [Erythema] : not erythematous [Discharge] : absent of discharge [Dehiscence] : not dehisced [de-identified] : Patient is a 71-year-old female who presents today for her first postoperative visit and Prineo tape removal.  She is status post left anterior total hip replacement performed at [facility] on April 20, 2022.  Left total hip replacement 4/20/22; 4/10 pain.\par  [de-identified] : Patient was discharged home with home care services.  She presents with her spouse for evaluation.  She is doing well.  Patient is ambulating where and walker.  She is able to use a cane as she has poor vision and it is not easy for her to use.  She is receiving home physical therapy and performing her home exercises.  Patient is not using any medication for pain at this time.  She continues to use Eliquis for DVT prophylaxis and prevention.  She is on a maintenance dose of Eliquis.  Patient continues to use meloxicam for prevention of heterotopic bone ossification until 21 days postoperative.\par \par  [de-identified] : There is no evidence of infection or cellulitis.  The left anterior hip incision with Dermabond tape intact.  Patient has a stable gait with a minimal postoperative limp.  Left hip flexes easily to 90 degrees with minimal discomfort.  The left hip can externally and internally rotate with minimal discomfort and stiffness.  Leg lengths appear to be perfectly equal. [de-identified] : Radiographs including 1 view of the pelvis and 1 view of the left hip were obtained at today's visit.  The x-rays revealed a well-positioned well fit total hip replacement in excellent position and alignment.  Curclage wire in place No obvious evidence of any fracture dislocation or loosening were noted. [de-identified] : The Dermabond tape was removed from the left anterior hip incision.  And Steri-Strips were applied.  Patient was advised of the nature of the healing process.  She was advised of the necessity for adherence to DVT prophylaxis protocol utilizing Eliquis 2.5 mg twice daily for the next 3 weeks and then to resume her maintenance dose as ordered by her cardiologist.  Patient was advised of the necessity for adherence to prevention of heterotopic bone ossification utilizing meloxicam 15 mg daily until 21 days postoperative.  She was advised to continue with physical therapy and home exercises.  She was advised to discontinue her anterior approach precautions slowly after 6 weeks postoperative.  A referral to start outpatient physical therapy was provided to the patient.  She was given a prescription for antibiotic amoxicillin for dental prophylaxis as per Dr. Contreras's protocol.  She will continue to use Tylenol as needed for analgesia if she is experiencing any discomfort.  Patient had her questions answered and gave a clear understanding of the instructions.  She was advised she may call the office at anytime with any questions or concerns.  Patient will follow up with Dr. Contreras in 6 weeks.

## 2022-06-13 NOTE — ED PROVIDER NOTE - BIRTH SEX
Female Topical Ketoconazole Counseling: Patient counseled that this medication may cause skin irritation or allergic reactions.  In the event of skin irritation, the patient was advised to reduce the amount of the drug applied or use it less frequently.   The patient verbalized understanding of the proper use and possible adverse effects of ketoconazole.  All of the patient's questions and concerns were addressed.

## 2022-06-21 ENCOUNTER — APPOINTMENT (OUTPATIENT)
Dept: ORTHOPEDIC SURGERY | Facility: CLINIC | Age: 72
End: 2022-06-21

## 2022-06-21 VITALS — DIASTOLIC BLOOD PRESSURE: 79 MMHG | HEART RATE: 72 BPM | SYSTOLIC BLOOD PRESSURE: 117 MMHG | TEMPERATURE: 97.4 F

## 2022-06-21 PROCEDURE — 73502 X-RAY EXAM HIP UNI 2-3 VIEWS: CPT | Mod: LT

## 2022-06-21 PROCEDURE — 99024 POSTOP FOLLOW-UP VISIT: CPT

## 2022-06-23 ENCOUNTER — APPOINTMENT (OUTPATIENT)
Dept: MRI IMAGING | Facility: CLINIC | Age: 72
End: 2022-06-23
Payer: MEDICARE

## 2022-06-23 ENCOUNTER — OUTPATIENT (OUTPATIENT)
Dept: OUTPATIENT SERVICES | Facility: HOSPITAL | Age: 72
LOS: 1 days | End: 2022-06-23
Payer: MEDICARE

## 2022-06-23 DIAGNOSIS — Z96.642 PRESENCE OF LEFT ARTIFICIAL HIP JOINT: ICD-10-CM

## 2022-06-23 DIAGNOSIS — Z96.641 PRESENCE OF RIGHT ARTIFICIAL HIP JOINT: Chronic | ICD-10-CM

## 2022-06-23 DIAGNOSIS — Z98.890 OTHER SPECIFIED POSTPROCEDURAL STATES: Chronic | ICD-10-CM

## 2022-06-23 DIAGNOSIS — Z90.710 ACQUIRED ABSENCE OF BOTH CERVIX AND UTERUS: Chronic | ICD-10-CM

## 2022-06-23 DIAGNOSIS — Z86.69 PERSONAL HISTORY OF OTHER DISEASES OF THE NERVOUS SYSTEM AND SENSE ORGANS: Chronic | ICD-10-CM

## 2022-06-23 PROCEDURE — 73721 MRI JNT OF LWR EXTRE W/O DYE: CPT

## 2022-06-23 PROCEDURE — 73721 MRI JNT OF LWR EXTRE W/O DYE: CPT | Mod: 26,LT,MH

## 2022-06-28 ENCOUNTER — NON-APPOINTMENT (OUTPATIENT)
Age: 72
End: 2022-06-28

## 2022-06-30 ENCOUNTER — NON-APPOINTMENT (OUTPATIENT)
Age: 72
End: 2022-06-30

## 2022-06-30 LAB
BASOPHILS # BLD AUTO: 0.06 K/UL
BASOPHILS NFR BLD AUTO: 0.7 %
CRP SERPL-MCNC: <3 MG/L
EOSINOPHIL # BLD AUTO: 0.24 K/UL
EOSINOPHIL NFR BLD AUTO: 2.9 %
ERYTHROCYTE [SEDIMENTATION RATE] IN BLOOD BY WESTERGREN METHOD: 3 MM/HR
HCT VFR BLD CALC: 39.3 %
HGB BLD-MCNC: 12.8 G/DL
IMM GRANULOCYTES NFR BLD AUTO: 0.2 %
LYMPHOCYTES # BLD AUTO: 2.36 K/UL
LYMPHOCYTES NFR BLD AUTO: 28 %
MAN DIFF?: NORMAL
MCHC RBC-ENTMCNC: 29.6 PG
MCHC RBC-ENTMCNC: 32.6 GM/DL
MCV RBC AUTO: 90.8 FL
MONOCYTES # BLD AUTO: 0.5 K/UL
MONOCYTES NFR BLD AUTO: 5.9 %
NEUTROPHILS # BLD AUTO: 5.24 K/UL
NEUTROPHILS NFR BLD AUTO: 62.3 %
PLATELET # BLD AUTO: 336 K/UL
RBC # BLD: 4.33 M/UL
RBC # FLD: 12.3 %
WBC # FLD AUTO: 8.42 K/UL

## 2022-07-12 ENCOUNTER — APPOINTMENT (OUTPATIENT)
Dept: ORTHOPEDIC SURGERY | Facility: CLINIC | Age: 72
End: 2022-07-12

## 2022-07-12 VITALS — SYSTOLIC BLOOD PRESSURE: 108 MMHG | HEART RATE: 69 BPM | DIASTOLIC BLOOD PRESSURE: 70 MMHG

## 2022-07-12 DIAGNOSIS — Z96.642 PRESENCE OF LEFT ARTIFICIAL HIP JOINT: ICD-10-CM

## 2022-07-12 PROCEDURE — 73502 X-RAY EXAM HIP UNI 2-3 VIEWS: CPT

## 2022-07-12 PROCEDURE — 99024 POSTOP FOLLOW-UP VISIT: CPT

## 2022-09-13 ENCOUNTER — APPOINTMENT (OUTPATIENT)
Dept: ORTHOPEDIC SURGERY | Facility: CLINIC | Age: 72
End: 2022-09-13

## 2023-01-13 ENCOUNTER — APPOINTMENT (OUTPATIENT)
Dept: OBGYN | Facility: CLINIC | Age: 73
End: 2023-01-13
Payer: MEDICARE

## 2023-01-13 VITALS
BODY MASS INDEX: 23.92 KG/M2 | RESPIRATION RATE: 16 BRPM | OXYGEN SATURATION: 98 % | HEART RATE: 74 BPM | SYSTOLIC BLOOD PRESSURE: 125 MMHG | TEMPERATURE: 97.1 F | HEIGHT: 62 IN | DIASTOLIC BLOOD PRESSURE: 72 MMHG | WEIGHT: 130 LBS

## 2023-01-13 PROCEDURE — G0101: CPT

## 2023-01-13 NOTE — PHYSICAL EXAM
[Appropriately responsive] : appropriately responsive [Alert] : alert [No Acute Distress] : no acute distress [Soft] : soft [Non-tender] : non-tender [Non-distended] : non-distended [No HSM] : No HSM [No Lesions] : no lesions [No Mass] : no mass [Oriented x3] : oriented x3 [Examination Of The Breasts] : a normal appearance [___cm] : a ~M [unfilled] ~Ucm inferior lateral quadrant mass was palpated [No Masses] : no breast masses were palpable [Labia Majora] : normal [Labia Minora] : normal [Normal] : normal [Uterine Adnexae] : normal

## 2023-01-13 NOTE — PLAN
[FreeTextEntry1] : \par \par I spent the time noted on the day of this patient encounter preparing for, providing and documenting the above service. I have  counseled and educated the patient on the differential, workup, disease course, and treatment/management plan. Education was provided to the patient during this encounter. All questions and concerns were answered and addressed in detail.\par \par Suzette Decker MD\par

## 2023-01-16 LAB
C TRACH RRNA SPEC QL NAA+PROBE: NOT DETECTED
CANDIDA VAG CYTO: NOT DETECTED
G VAGINALIS+PREV SP MTYP VAG QL MICRO: NOT DETECTED
HPV HIGH+LOW RISK DNA PNL CVX: NOT DETECTED
N GONORRHOEA RRNA SPEC QL NAA+PROBE: NOT DETECTED
SOURCE AMPLIFICATION: NORMAL
T VAGINALIS VAG QL WET PREP: NOT DETECTED

## 2023-01-23 LAB — CYTOLOGY CVX/VAG DOC THIN PREP: ABNORMAL

## 2023-02-14 NOTE — ED ADULT NURSE NOTE - CAS EDN DISCHARGE INTERVENTIONS
Patient calling asking for the results of his bloodwork, Please advise
IV discontinued, cath removed intact

## 2023-04-03 NOTE — H&P PST ADULT - NS PRO ABUSE SCREEN SUSPICION NEGLECT YN
04/03/23      Lisa Glover  [address]  [place] [address]               Please excuse Lisa Glover from Work.  She has been seen by Dr. Snyder at [facility] in [place] today and may return today without restrictions.         SIGNATURE:_______________________________________,   04/03/23     Julian Snyder M.D.           [address]  [address]  T [phone]  F [phone]  [email or web address]      
no

## 2023-04-08 ENCOUNTER — APPOINTMENT (OUTPATIENT)
Dept: ULTRASOUND IMAGING | Facility: CLINIC | Age: 73
End: 2023-04-08

## 2023-05-15 ENCOUNTER — APPOINTMENT (OUTPATIENT)
Dept: CT IMAGING | Facility: HOSPITAL | Age: 73
End: 2023-05-15
Payer: MEDICARE

## 2023-05-15 ENCOUNTER — OUTPATIENT (OUTPATIENT)
Dept: OUTPATIENT SERVICES | Facility: HOSPITAL | Age: 73
LOS: 1 days | End: 2023-05-15
Payer: MEDICARE

## 2023-05-15 DIAGNOSIS — Z90.710 ACQUIRED ABSENCE OF BOTH CERVIX AND UTERUS: Chronic | ICD-10-CM

## 2023-05-15 DIAGNOSIS — Z86.69 PERSONAL HISTORY OF OTHER DISEASES OF THE NERVOUS SYSTEM AND SENSE ORGANS: Chronic | ICD-10-CM

## 2023-05-15 DIAGNOSIS — Z96.641 PRESENCE OF RIGHT ARTIFICIAL HIP JOINT: Chronic | ICD-10-CM

## 2023-05-15 DIAGNOSIS — Z00.8 ENCOUNTER FOR OTHER GENERAL EXAMINATION: ICD-10-CM

## 2023-05-15 DIAGNOSIS — Z98.890 OTHER SPECIFIED POSTPROCEDURAL STATES: Chronic | ICD-10-CM

## 2023-05-15 PROCEDURE — 74177 CT ABD & PELVIS W/CONTRAST: CPT | Mod: 26,MH

## 2023-05-15 PROCEDURE — 74177 CT ABD & PELVIS W/CONTRAST: CPT

## 2023-07-25 ENCOUNTER — EMERGENCY (EMERGENCY)
Facility: HOSPITAL | Age: 73
LOS: 0 days | Discharge: HOME | End: 2023-07-25
Attending: STUDENT IN AN ORGANIZED HEALTH CARE EDUCATION/TRAINING PROGRAM
Payer: MEDICARE

## 2023-07-25 VITALS
RESPIRATION RATE: 16 BRPM | SYSTOLIC BLOOD PRESSURE: 127 MMHG | HEART RATE: 78 BPM | OXYGEN SATURATION: 99 % | TEMPERATURE: 98 F | DIASTOLIC BLOOD PRESSURE: 72 MMHG

## 2023-07-25 VITALS — WEIGHT: 126.1 LBS | HEIGHT: 62 IN

## 2023-07-25 DIAGNOSIS — Z98.890 OTHER SPECIFIED POSTPROCEDURAL STATES: Chronic | ICD-10-CM

## 2023-07-25 DIAGNOSIS — Z96.641 PRESENCE OF RIGHT ARTIFICIAL HIP JOINT: Chronic | ICD-10-CM

## 2023-07-25 DIAGNOSIS — E78.00 PURE HYPERCHOLESTEROLEMIA, UNSPECIFIED: ICD-10-CM

## 2023-07-25 DIAGNOSIS — I48.91 UNSPECIFIED ATRIAL FIBRILLATION: ICD-10-CM

## 2023-07-25 DIAGNOSIS — Z79.01 LONG TERM (CURRENT) USE OF ANTICOAGULANTS: ICD-10-CM

## 2023-07-25 DIAGNOSIS — M16.0 BILATERAL PRIMARY OSTEOARTHRITIS OF HIP: ICD-10-CM

## 2023-07-25 DIAGNOSIS — I10 ESSENTIAL (PRIMARY) HYPERTENSION: ICD-10-CM

## 2023-07-25 DIAGNOSIS — Z90.710 ACQUIRED ABSENCE OF BOTH CERVIX AND UTERUS: Chronic | ICD-10-CM

## 2023-07-25 DIAGNOSIS — R42 DIZZINESS AND GIDDINESS: ICD-10-CM

## 2023-07-25 DIAGNOSIS — Z96.641 PRESENCE OF RIGHT ARTIFICIAL HIP JOINT: ICD-10-CM

## 2023-07-25 DIAGNOSIS — Z86.69 PERSONAL HISTORY OF OTHER DISEASES OF THE NERVOUS SYSTEM AND SENSE ORGANS: Chronic | ICD-10-CM

## 2023-07-25 DIAGNOSIS — Z88.2 ALLERGY STATUS TO SULFONAMIDES: ICD-10-CM

## 2023-07-25 DIAGNOSIS — F41.9 ANXIETY DISORDER, UNSPECIFIED: ICD-10-CM

## 2023-07-25 DIAGNOSIS — Z90.710 ACQUIRED ABSENCE OF BOTH CERVIX AND UTERUS: ICD-10-CM

## 2023-07-25 DIAGNOSIS — F32.A DEPRESSION, UNSPECIFIED: ICD-10-CM

## 2023-07-25 DIAGNOSIS — H54.8 LEGAL BLINDNESS, AS DEFINED IN USA: ICD-10-CM

## 2023-07-25 LAB
ALBUMIN SERPL ELPH-MCNC: 3.8 G/DL — SIGNIFICANT CHANGE UP (ref 3.3–5)
ALP SERPL-CCNC: 83 U/L — SIGNIFICANT CHANGE UP (ref 40–120)
ALT FLD-CCNC: 33 U/L — SIGNIFICANT CHANGE UP (ref 12–78)
ANION GAP SERPL CALC-SCNC: 4 MMOL/L — LOW (ref 5–17)
APPEARANCE UR: CLEAR — SIGNIFICANT CHANGE UP
AST SERPL-CCNC: 21 U/L — SIGNIFICANT CHANGE UP (ref 15–37)
BASOPHILS # BLD AUTO: 0.08 K/UL — SIGNIFICANT CHANGE UP (ref 0–0.2)
BASOPHILS NFR BLD AUTO: 0.9 % — SIGNIFICANT CHANGE UP (ref 0–2)
BILIRUB SERPL-MCNC: 0.3 MG/DL — SIGNIFICANT CHANGE UP (ref 0.2–1.2)
BILIRUB UR-MCNC: NEGATIVE — SIGNIFICANT CHANGE UP
BUN SERPL-MCNC: 9 MG/DL — SIGNIFICANT CHANGE UP (ref 7–23)
CALCIUM SERPL-MCNC: 9.3 MG/DL — SIGNIFICANT CHANGE UP (ref 8.5–10.1)
CHLORIDE SERPL-SCNC: 103 MMOL/L — SIGNIFICANT CHANGE UP (ref 96–108)
CO2 SERPL-SCNC: 28 MMOL/L — SIGNIFICANT CHANGE UP (ref 22–31)
COLOR SPEC: SIGNIFICANT CHANGE UP
CREAT SERPL-MCNC: 0.79 MG/DL — SIGNIFICANT CHANGE UP (ref 0.5–1.3)
DIFF PNL FLD: ABNORMAL
EGFR: 78 ML/MIN/1.73M2 — SIGNIFICANT CHANGE UP
EOSINOPHIL # BLD AUTO: 0.38 K/UL — SIGNIFICANT CHANGE UP (ref 0–0.5)
EOSINOPHIL NFR BLD AUTO: 4.4 % — SIGNIFICANT CHANGE UP (ref 0–6)
GLUCOSE SERPL-MCNC: 96 MG/DL — SIGNIFICANT CHANGE UP (ref 70–99)
GLUCOSE UR QL: NEGATIVE — SIGNIFICANT CHANGE UP
HCT VFR BLD CALC: 38 % — SIGNIFICANT CHANGE UP (ref 34.5–45)
HGB BLD-MCNC: 12.8 G/DL — SIGNIFICANT CHANGE UP (ref 11.5–15.5)
IMM GRANULOCYTES NFR BLD AUTO: 0.2 % — SIGNIFICANT CHANGE UP (ref 0–0.9)
KETONES UR-MCNC: NEGATIVE — SIGNIFICANT CHANGE UP
LEUKOCYTE ESTERASE UR-ACNC: NEGATIVE — SIGNIFICANT CHANGE UP
LYMPHOCYTES # BLD AUTO: 1.79 K/UL — SIGNIFICANT CHANGE UP (ref 1–3.3)
LYMPHOCYTES # BLD AUTO: 21 % — SIGNIFICANT CHANGE UP (ref 13–44)
MAGNESIUM SERPL-MCNC: 1.8 MG/DL — SIGNIFICANT CHANGE UP (ref 1.6–2.6)
MCHC RBC-ENTMCNC: 30.8 PG — SIGNIFICANT CHANGE UP (ref 27–34)
MCHC RBC-ENTMCNC: 33.7 GM/DL — SIGNIFICANT CHANGE UP (ref 32–36)
MCV RBC AUTO: 91.6 FL — SIGNIFICANT CHANGE UP (ref 80–100)
MONOCYTES # BLD AUTO: 0.68 K/UL — SIGNIFICANT CHANGE UP (ref 0–0.9)
MONOCYTES NFR BLD AUTO: 8 % — SIGNIFICANT CHANGE UP (ref 2–14)
NEUTROPHILS # BLD AUTO: 5.59 K/UL — SIGNIFICANT CHANGE UP (ref 1.8–7.4)
NEUTROPHILS NFR BLD AUTO: 65.5 % — SIGNIFICANT CHANGE UP (ref 43–77)
NITRITE UR-MCNC: NEGATIVE — SIGNIFICANT CHANGE UP
PH UR: 7 — SIGNIFICANT CHANGE UP (ref 5–8)
PLATELET # BLD AUTO: 226 K/UL — SIGNIFICANT CHANGE UP (ref 150–400)
POTASSIUM SERPL-MCNC: 4 MMOL/L — SIGNIFICANT CHANGE UP (ref 3.5–5.3)
POTASSIUM SERPL-SCNC: 4 MMOL/L — SIGNIFICANT CHANGE UP (ref 3.5–5.3)
PROT SERPL-MCNC: 7.2 GM/DL — SIGNIFICANT CHANGE UP (ref 6–8.3)
PROT UR-MCNC: NEGATIVE — SIGNIFICANT CHANGE UP
RBC # BLD: 4.15 M/UL — SIGNIFICANT CHANGE UP (ref 3.8–5.2)
RBC # FLD: 12.2 % — SIGNIFICANT CHANGE UP (ref 10.3–14.5)
SODIUM SERPL-SCNC: 135 MMOL/L — SIGNIFICANT CHANGE UP (ref 135–145)
SP GR SPEC: 1.01 — SIGNIFICANT CHANGE UP (ref 1.01–1.02)
TROPONIN I, HIGH SENSITIVITY RESULT: 3.88 NG/L — SIGNIFICANT CHANGE UP
UROBILINOGEN FLD QL: NEGATIVE — SIGNIFICANT CHANGE UP
WBC # BLD: 8.54 K/UL — SIGNIFICANT CHANGE UP (ref 3.8–10.5)
WBC # FLD AUTO: 8.54 K/UL — SIGNIFICANT CHANGE UP (ref 3.8–10.5)

## 2023-07-25 PROCEDURE — 83735 ASSAY OF MAGNESIUM: CPT

## 2023-07-25 PROCEDURE — 81001 URINALYSIS AUTO W/SCOPE: CPT

## 2023-07-25 PROCEDURE — 84484 ASSAY OF TROPONIN QUANT: CPT

## 2023-07-25 PROCEDURE — 36415 COLL VENOUS BLD VENIPUNCTURE: CPT

## 2023-07-25 PROCEDURE — 93005 ELECTROCARDIOGRAM TRACING: CPT

## 2023-07-25 PROCEDURE — 85025 COMPLETE CBC W/AUTO DIFF WBC: CPT

## 2023-07-25 PROCEDURE — 99284 EMERGENCY DEPT VISIT MOD MDM: CPT

## 2023-07-25 PROCEDURE — 99285 EMERGENCY DEPT VISIT HI MDM: CPT | Mod: 25

## 2023-07-25 PROCEDURE — 70450 CT HEAD/BRAIN W/O DYE: CPT | Mod: MA

## 2023-07-25 PROCEDURE — 87086 URINE CULTURE/COLONY COUNT: CPT

## 2023-07-25 PROCEDURE — 93010 ELECTROCARDIOGRAM REPORT: CPT

## 2023-07-25 PROCEDURE — 70450 CT HEAD/BRAIN W/O DYE: CPT | Mod: 26,MA

## 2023-07-25 PROCEDURE — 80053 COMPREHEN METABOLIC PANEL: CPT

## 2023-07-25 RX ORDER — MECLIZINE HCL 12.5 MG
25 TABLET ORAL ONCE
Refills: 0 | Status: COMPLETED | OUTPATIENT
Start: 2023-07-25 | End: 2023-07-25

## 2023-07-25 RX ORDER — SODIUM CHLORIDE 9 MG/ML
1000 INJECTION INTRAMUSCULAR; INTRAVENOUS; SUBCUTANEOUS ONCE
Refills: 0 | Status: COMPLETED | OUTPATIENT
Start: 2023-07-25 | End: 2023-07-25

## 2023-07-25 RX ADMIN — SODIUM CHLORIDE 1000 MILLILITER(S): 9 INJECTION INTRAMUSCULAR; INTRAVENOUS; SUBCUTANEOUS at 14:59

## 2023-07-25 RX ADMIN — Medication 25 MILLIGRAM(S): at 13:09

## 2023-07-25 NOTE — ED ADULT NURSE NOTE - NS ED NOTE  TALK SOMEONE YN
Wil Morel is a 11 month old male who was brought in for this visit. History was provided by the CAREGIVER. HPI:   Patient presents with: Well Child: 6 month.  formula fed      Diet: enfamil 22 elgin 6 oz q 4 hours  Elimination: soft stools  Sleep: bilaterally  Extremities: no edema, cyanosis, or clubbing  Neurological: exam appropriate for age, reflexes and motor skills appropriate for age  Psychiatric: behavior is appropriate for age, communicates appropriately for age    Results From Past 50 Hours No

## 2023-07-25 NOTE — ED ADULT TRIAGE NOTE - CHIEF COMPLAINT QUOTE
pt c/o dehydration, "feeling foggy," dizziness, lightheadedness, unsteady on feet/trouble walking for 2 weeks.  ekg requested upon arrival to ED

## 2023-07-25 NOTE — ED ADULT TRIAGE NOTE - HEIGHT IN CM
157.48 Patient's wife calling to get referral for Dr. Freddie Dawson, patient was called at last minute for an appointment 04/21/20 for cortisone injection.  Referral needed

## 2023-07-25 NOTE — ED PROVIDER NOTE - ATTENDING CONTRIBUTION TO CARE
I, Lucien Grady DO,  performed the initial face to face bedside interview with this patient regarding history of present illness, review of symptoms and relevant past medical, social and family history.  I completed an independent physical examination.  I was the initial provider who evaluated this patient. I have signed out the follow up of any pending tests (i.e. labs, radiological studies) to the resident.  I have communicated the patient’s plan of care and disposition with the resident.  The history, relevant review of systems, past medical and surgical history, medical decision making, and physical examination was documented by the scribe in my presence and I attest to the accuracy of the documentation.    shared pe  Constitutional: NAD AAOx3  Eyes: PERRLA EOMI  Head: Normocephalic atraumatic  Mouth: MMM  Cardiac: regular rate   Resp: Lungs CTAB  GI: Abd s/nt/nd  Neuro: CN2-12 intact  Skin: No visible rashes

## 2023-07-25 NOTE — ED PROVIDER NOTE - PATIENT PORTAL LINK FT
You can access the FollowMyHealth Patient Portal offered by [facility] by registering at the following website: [email or web address]. By joining [facility]’s FollowMyHealth portal, you will also be able to view your health information using other applications (apps) compatible with our system.

## 2023-07-25 NOTE — ED PROVIDER NOTE - NEUROLOGICAL, MLM
No focal deficit, CN III-XII intact. Visual fields baseline in all visual fields, PERRL, EOMF, facial sensory and motor intact, no pronator drift, motor strength 5/5 throughout, tone WNL, DTRs equal, Sensory Intact to touch. Mildly ataxic gait. No focal deficit, CN III-XII intact. Visual fields baseline in all visual fields, PERRL, EOMF, facial sensory and motor intact, no pronator drift, motor strength 5/5 throughout, tone WNL, Sensory Intact to touch. Mildly ataxic gait.

## 2023-07-25 NOTE — ED PROVIDER NOTE - OBJECTIVE STATEMENT
Pt is a 73y female with a PMH of Afib, vertigo, cardiac radiofrequency ablation 2021, HTN, HLD, legally blind b/l, osteoarthritis presents to the ED c/o intermittent exacerbating episodes of dizziness/lightheadedness associated with vertigo. Pt states she "feels foggy" and is "losing simple words." Was going to see neurologist for symptoms, but could not wait due to frequency of symptoms. Ambulation noted to be different at present from baseline,  says she "usually walks faster." Has Afib, endorses intermittent CP. Pt denies poor PO intake, dysuria, numbness/tingling, recent falls, n/v, SOB.

## 2023-07-25 NOTE — ED PROVIDER NOTE - PROGRESS NOTE DETAILS
Bret Fuentes, DO PGY-3: Labs nonactionable, CT without acute pathology, patient feeling okay and has appointment to see neurology on Friday.  Will discharge patient to follow-up with PMD and neurology

## 2023-07-25 NOTE — ED PROVIDER NOTE - NSFOLLOWUPINSTRUCTIONS_ED_ALL_ED_FT
1.  Please follow-up with your primary care doctor within the next week.  2.  Please follow-up with neurology on Friday as scheduled.    Dizziness    Dizziness can manifest as a feeling of unsteadiness or light-headedness. You may feel like you are about to faint. This condition can be caused by a number of things, including medicines, dehydration, or illness. Drink enough fluid to keep your urine clear or pale yellow. Do not drink alcohol and limit your caffeine intake. Avoid quick or sudden movements.  Rise slowly from chairs and steady yourself until you feel okay. In the morning, first sit up on the side of the bed.    SEEK IMMEDIATE MEDICAL CARE IF YOU HAVE ANY OF THE FOLLOWING SYMPTOMS: vomiting, changes in your vision or speech, weakness in your arms or legs, trouble speaking or swallowing, chest pain, abdominal pain, shortness of breath, sweating, bleeding, headache, neck pain, or fever.

## 2023-07-25 NOTE — ED ADULT NURSE NOTE - NSFALLRISKINTERV_ED_ALL_ED

## 2023-07-25 NOTE — ED ADULT NURSE NOTE - OBJECTIVE STATEMENT
pt present c/o multiple medical complaints. Pt reports dizziness, headaches, neck pain, double vision intermittently, loss of appetite, decreased PO intake, difficulty walking and staying stable. Pt reports that "the room feels like its spinning. Pt denies n/v/d, SOB, CP, LOC, falls, blurry vision. Pt reports the symptoms have been occurring for about three weeks. Pt states she started amlodipine 1 month ago for high BP and patient reports monitoring is sometimes and that it is "in normal range now."

## 2023-07-26 LAB
CULTURE RESULTS: SIGNIFICANT CHANGE UP
SPECIMEN SOURCE: SIGNIFICANT CHANGE UP

## 2023-07-28 ENCOUNTER — APPOINTMENT (OUTPATIENT)
Dept: NEUROLOGY | Facility: CLINIC | Age: 73
End: 2023-07-28
Payer: MEDICARE

## 2023-07-28 VITALS
TEMPERATURE: 97.6 F | DIASTOLIC BLOOD PRESSURE: 76 MMHG | SYSTOLIC BLOOD PRESSURE: 112 MMHG | HEIGHT: 64 IN | BODY MASS INDEX: 21.17 KG/M2 | WEIGHT: 124 LBS | HEART RATE: 89 BPM

## 2023-07-28 DIAGNOSIS — R42 DIZZINESS AND GIDDINESS: ICD-10-CM

## 2023-07-28 PROCEDURE — 99204 OFFICE O/P NEW MOD 45 MIN: CPT

## 2023-07-28 RX ORDER — MELOXICAM 15 MG/1
15 TABLET ORAL
Refills: 0 | Status: DISCONTINUED | COMMUNITY
End: 2023-07-28

## 2023-07-28 RX ORDER — APIXABAN 5 MG/1
5 TABLET, FILM COATED ORAL
Qty: 180 | Refills: 0 | Status: DISCONTINUED | COMMUNITY
Start: 2022-01-25 | End: 2023-07-28

## 2023-07-28 RX ORDER — DICLOFENAC SODIUM 75 MG/1
75 TABLET, DELAYED RELEASE ORAL
Qty: 30 | Refills: 0 | Status: DISCONTINUED | COMMUNITY
Start: 2022-06-28 | End: 2023-07-28

## 2023-07-28 NOTE — HISTORY OF PRESENT ILLNESS
[FreeTextEntry1] : 73 year old woman hx of a-fib, s/p cardiac ablation, c/o for the last 2 months of recurrent dizziness, feels the room spinning.When she turns her head, worse when she turns the head to the left. Also noticed a daily headache, takes Tylenol. No hx of headaches in the past. Also feels unsteady when she walk.\par She was evaluated by ENT, told she had peripheral vestibular dysfunction, and Epley maneuver was attempted but has not helped.\par A CAT scan of the head also was performed which did not demonstrate any significant abnormality.\par Denies any difficulty speaking, she has a history of chronic visual loss with limited vision in both eyes, occasional double vision, not specifically since the vertigo started.  Has noted some difficulty with memory recall, forgetting words.  Unclear if this is from the last 2 months.\par No episodes of unilateral weakness, numbness of the face or extremities.  No reported fever or chills, no malaise, no trouble swallowing or chewing.\par

## 2023-07-28 NOTE — DISCUSSION/SUMMARY
[FreeTextEntry1] : 73 year old woman hx of a-fib, s/p cardiac ablation, c/o for the last 2 months of recurrent dizziness, seems to be worse when she turns her head to the left.  Possible benign positional vertigo.\par Persistent symptoms not responding to the Epley maneuver, does have a history of A-fib, will like to rule out any intracranial event, small stroke.\par Plan: We will refer the patient to vestibular physical therapy.\par We will obtain an MRI of the brain without contrast.\par MRA of the brain without contrast.\par Return to the office, 6 to 8 weeks.

## 2023-07-28 NOTE — PHYSICAL EXAM
[General Appearance - Alert] : alert [General Appearance - In No Acute Distress] : in no acute distress [Oriented To Time, Place, And Person] : oriented to person, place, and time [Impaired Insight] : insight and judgment were intact [Affect] : the affect was normal [Person] : oriented to person [Place] : oriented to place [Time] : oriented to time [Concentration Intact] : normal concentrating ability [Visual Intact] : visual attention was ~T not ~L decreased [Naming Objects] : no difficulty naming common objects [Repeating Phrases] : no difficulty repeating a phrase [Writing A Sentence] : no difficulty writing a sentence [Fluency] : fluency intact [Comprehension] : comprehension intact [Reading] : reading intact [Past History] : adequate knowledge of personal past history [Cranial Nerves Optic (II)] : visual acuity intact bilaterally,  visual fields full to confrontation, pupils equal round and reactive to light [Cranial Nerves Oculomotor (III)] : extraocular motion intact [Cranial Nerves Trigeminal (V)] : facial sensation intact symmetrically [Cranial Nerves Facial (VII)] : face symmetrical [Cranial Nerves Vestibulocochlear (VIII)] : hearing was intact bilaterally [Cranial Nerves Glossopharyngeal (IX)] : tongue and palate midline [Cranial Nerves Accessory (XI - Cranial And Spinal)] : head turning and shoulder shrug symmetric [Cranial Nerves Hypoglossal (XII)] : there was no tongue deviation with protrusion [Motor Tone] : muscle tone was normal in all four extremities [Motor Strength] : muscle strength was normal in all four extremities [No Muscle Atrophy] : normal bulk in all four extremities [Sensation Tactile Decrease] : light touch was intact [Abnormal Walk] : normal gait [Balance] : balance was intact [Past-pointing] : there was no past-pointing [Tremor] : no tremor present [2+] : Ankle jerk left 2+ [Plantar Reflex Right Only] : normal on the right [Plantar Reflex Left Only] : normal on the left [FreeTextEntry5] : Ruperto-Hallpike maneuver demonstrated no nystagmus.  Patient did complain of increasing vertigo with the left ear down. [Outer Ear] : the ears and nose were normal in appearance [Hearing Threshold Finger Rub Not Leake] : hearing was normal [FreeTextEntry1] : Both temporal arteries are palpable, nontender to palpation. [Neck Appearance] : the appearance of the neck was normal [] : the neck was supple [Neck Cervical Mass (___cm)] : no neck mass was observed [Respiration, Rhythm And Depth] : normal respiratory rhythm and effort [Exaggerated Use Of Accessory Muscles For Inspiration] : no accessory muscle use [Auscultation Breath Sounds / Voice Sounds] : lungs were clear to auscultation bilaterally [Heart Rate And Rhythm] : heart rate was normal and rhythm regular [Heart Sounds] : normal S1 and S2 [Heart Sounds Gallop] : no gallops [Systolic grade ___/6] : A grade [unfilled]/6 systolic murmur was heard. [Arterial Pulses Carotid] : carotid pulses were normal with no bruits [Full Pulse] : the pedal pulses are present [Edema] : there was no peripheral edema [Skin Color & Pigmentation] : normal skin color and pigmentation [Skin Turgor] : normal skin turgor

## 2023-07-28 NOTE — DATA REVIEWED
[de-identified] :  \par \par \par ACC: [id number]     EXAM:  CT BRAIN   ORDERED BY: LORETTA JAIN\par \par PROCEDURE DATE:  07/25/2023\par \par \par \par INTERPRETATION:  CLINICAL INFORMATION: dizziness. JCT. .\par \par TECHNIQUE: Sequential axial images were obtained from the vertex to the skull base without intravenous contrast. Coronal and sagittal reformations were obtained.\par \par COMPARISON: No prior imaging available for comparison.\par \par FINDINGS:\par \par There is no acute intracranial hemorrhage or mass effect. There are areas of hypodensity in the bilateral hemispheric white matter suggesting white matter microvascular ischemic change. The ventricles and sulci are normal in size for patient's age. Age-related parenchymal volume loss.\par \par There is no extraaxial fluid collection.\par \par There is no displaced calvarial fracture. The visualized orbits are within normal limits. Bilateral retinal calcifications consistent with drusen. There is a diminutive right frontal sinus. Otherwise the visualized portions of the paranasal sinuses are well aerated. The mastoid air cells are well aerated.\par \par \par IMPRESSION:\par No acute intracranial hemorrhage or mass effect.\par \par --- End of Report ---\par \par \par \par \par KARI HEREDIA DO; Resident Radiologist\par This document has been electronically signed.\par ASHER MCDONOUGH MD; Attending Radiologist\par This document has been electronically signed. Jul 25 2023  3:43PM\par

## 2023-08-20 NOTE — PHYSICAL THERAPY INITIAL EVALUATION ADULT - DISCHARGE DISPOSITION, PT EVAL
C/o left side of face swelling with dental pain for the past day. Pain to UR molar. Last DDS visit ~ 3 months ago, \"insurance denied root canal\"   Taking tylenol and BC without relief.   
home w/ home PT

## 2023-09-29 ENCOUNTER — APPOINTMENT (OUTPATIENT)
Dept: NEUROLOGY | Facility: CLINIC | Age: 73
End: 2023-09-29

## 2023-12-13 NOTE — H&P PST ADULT - PROBLEM SELECTOR PLAN 1
Notified patient via phone of results as stated below. Recommended she follow through with the colonoscopy tomorrow.    Left hip replacement is planned for 4/20/2022  Covid PCR testing iss cheduled for 4/18/2022 @ [facility].  Medical and cardiac clearances requested  Patient to obtain instructions for eliquis  Pre op instructions were reviewed; best wishes offered

## 2024-04-02 ENCOUNTER — NON-APPOINTMENT (OUTPATIENT)
Age: 74
End: 2024-04-02

## 2024-04-02 ENCOUNTER — APPOINTMENT (OUTPATIENT)
Dept: CARDIOLOGY | Facility: CLINIC | Age: 74
End: 2024-04-02
Payer: MEDICARE

## 2024-04-02 VITALS
OXYGEN SATURATION: 97 % | SYSTOLIC BLOOD PRESSURE: 120 MMHG | DIASTOLIC BLOOD PRESSURE: 80 MMHG | BODY MASS INDEX: 22.49 KG/M2 | HEART RATE: 78 BPM | WEIGHT: 131 LBS

## 2024-04-02 VITALS
BODY MASS INDEX: 22.36 KG/M2 | RESPIRATION RATE: 15 BRPM | SYSTOLIC BLOOD PRESSURE: 120 MMHG | WEIGHT: 131 LBS | HEART RATE: 78 BPM | OXYGEN SATURATION: 97 % | HEIGHT: 64 IN | DIASTOLIC BLOOD PRESSURE: 80 MMHG

## 2024-04-02 DIAGNOSIS — I48.91 UNSPECIFIED ATRIAL FIBRILLATION: ICD-10-CM

## 2024-04-02 DIAGNOSIS — Z12.39 ENCOUNTER FOR OTHER SCREENING FOR MALIGNANT NEOPLASM OF BREAST: ICD-10-CM

## 2024-04-02 DIAGNOSIS — Z01.419 ENCOUNTER FOR GYNECOLOGICAL EXAMINATION (GENERAL) (ROUTINE) W/OUT ABNORMAL FINDINGS: ICD-10-CM

## 2024-04-02 DIAGNOSIS — I10 ESSENTIAL (PRIMARY) HYPERTENSION: ICD-10-CM

## 2024-04-02 DIAGNOSIS — Z13.820 ENCOUNTER FOR SCREENING FOR OSTEOPOROSIS: ICD-10-CM

## 2024-04-02 PROCEDURE — 99204 OFFICE O/P NEW MOD 45 MIN: CPT

## 2024-04-02 PROCEDURE — 93000 ELECTROCARDIOGRAM COMPLETE: CPT

## 2024-04-02 RX ORDER — MULTIVIT-MIN/FOLIC/VIT K/LYCOP 400-300MCG
50 MCG TABLET ORAL
Refills: 0 | Status: ACTIVE | COMMUNITY

## 2024-04-02 RX ORDER — SOTALOL HYDROCHLORIDE 80 MG/1
80 TABLET ORAL
Refills: 0 | Status: ACTIVE | COMMUNITY
Start: 2020-12-23

## 2024-04-02 RX ORDER — SODIUM PICOSULFATE, MAGNESIUM OXIDE, AND ANHYDROUS CITRIC ACID 10; 3.5; 12 MG/160ML; G/160ML; G/160ML
10-3.5-12 MG-GM LIQUID ORAL
Qty: 320 | Refills: 0 | Status: COMPLETED | COMMUNITY
Start: 2021-10-21 | End: 2024-04-02

## 2024-04-02 RX ORDER — ESZOPICLONE 3 MG/1
3 TABLET, COATED ORAL
Refills: 0 | Status: ACTIVE | COMMUNITY

## 2024-04-02 RX ORDER — BUPROPION HYDROCHLORIDE 150 MG/1
150 TABLET, EXTENDED RELEASE ORAL DAILY
Refills: 0 | Status: COMPLETED | COMMUNITY
End: 2024-04-02

## 2024-04-02 RX ORDER — ALPRAZOLAM 0.25 MG/1
0.25 TABLET ORAL 4 TIMES DAILY
Refills: 0 | Status: ACTIVE | COMMUNITY

## 2024-04-02 RX ORDER — ASPIRIN 81 MG
81 TABLET, DELAYED RELEASE (ENTERIC COATED) ORAL
Refills: 0 | Status: ACTIVE | COMMUNITY

## 2024-04-02 RX ORDER — PNV NO.95/FERROUS FUM/FOLIC AC 28MG-0.8MG
100 TABLET ORAL DAILY
Refills: 0 | Status: ACTIVE | COMMUNITY

## 2024-04-02 RX ORDER — ATORVASTATIN CALCIUM 10 MG/1
10 TABLET, FILM COATED ORAL
Refills: 0 | Status: ACTIVE | COMMUNITY

## 2024-04-02 RX ORDER — AMLODIPINE BESYLATE 2.5 MG/1
2.5 TABLET ORAL DAILY
Refills: 0 | Status: COMPLETED | COMMUNITY
End: 2024-04-02

## 2024-04-02 NOTE — PHYSICAL EXAM
[Normal S1, S2] : normal S1, S2 [Clear Lung Fields] : clear lung fields [Normal Gait] : normal gait [Normal Bowel Sounds] : normal bowel sounds [No Edema] : no edema [Normal Speech] : normal speech [de-identified] : Appears her stated age and well [Alert and Oriented] : alert and oriented [de-identified] : Normocephalic [de-identified] : Extraocular muscles intact [de-identified] : No JVD or carotid bruit [de-identified] : Warm, dry

## 2024-04-02 NOTE — REASON FOR VISIT
[Arrhythmia/ECG Abnorrmalities] : arrhythmia/ECG abnormalities [Hypertension] : hypertension [Hyperlipidemia] : hyperlipidemia [Family Member] : family member

## 2024-04-02 NOTE — CARDIOLOGY SUMMARY
[de-identified] : 4/2/24. Sinus Rhythm  [de-identified] : 2023: "Normal." (report not available at time of encounter) [de-identified] : 2023: "Normal." (report not available at time of encounter)

## 2024-04-02 NOTE — HISTORY OF PRESENT ILLNESS
[FreeTextEntry1] : Stella Villatoro is a 73-year-old woman with a history of atrial fibrillation (ablated in 2020 by Dr. Guerrero and with anticoagulation discontinued in 2023), hypertension, hyperlipidemia, COPD, spinal stenosis, depression, who presents for cardiology consultation - She has been under the care of Dr. Joy Landers but now wishes to transition to my practice.  She describes chronic, stable heart disease.  She has a good baseline functional status and has not been experiencing angina, dyspnea, palpitations, or syncope.  She reports no recent recurrence of atrial fibrillation; currently monitored with an implanted loop recorder.  She offers no cardiac complaints during today's visit.

## 2024-04-02 NOTE — DISCUSSION/SUMMARY
[With Me] : with me [___ Year(s)] : in [unfilled] year(s) [FreeTextEntry1] :  Atrial fibrillation: Currently in sinus rhythm and without recurrence of arrhythmia in several years; continue sotalol 40 mg twice daily. I will request that monthly ILR reports get sent to me (managed by Dr. Umang Taylor).  Hypertension: Satisfactory control; continue losartan.

## 2024-04-02 NOTE — REVIEW OF SYSTEMS
[SOB] : no shortness of breath [Chest Discomfort] : no chest discomfort [Negative] : Heme/Lymph [FreeTextEntry3] : Poor vision; legally blind

## 2024-05-01 RX ORDER — APIXABAN 5 MG/1
5 TABLET, FILM COATED ORAL
Qty: 180 | Refills: 1 | Status: ACTIVE | COMMUNITY
Start: 2024-05-01 | End: 1900-01-01

## 2024-05-21 ENCOUNTER — EMERGENCY (EMERGENCY)
Facility: HOSPITAL | Age: 74
LOS: 1 days | Discharge: HOME | End: 2024-05-21
Attending: EMERGENCY MEDICINE | Admitting: EMERGENCY MEDICINE
Payer: MEDICARE

## 2024-05-21 VITALS
SYSTOLIC BLOOD PRESSURE: 106 MMHG | HEART RATE: 81 BPM | OXYGEN SATURATION: 95 % | RESPIRATION RATE: 18 BRPM | DIASTOLIC BLOOD PRESSURE: 58 MMHG

## 2024-05-21 VITALS
HEART RATE: 93 BPM | RESPIRATION RATE: 16 BRPM | HEIGHT: 62 IN | WEIGHT: 119.93 LBS | SYSTOLIC BLOOD PRESSURE: 149 MMHG | TEMPERATURE: 98 F | OXYGEN SATURATION: 98 % | DIASTOLIC BLOOD PRESSURE: 92 MMHG

## 2024-05-21 DIAGNOSIS — Z98.890 OTHER SPECIFIED POSTPROCEDURAL STATES: Chronic | ICD-10-CM

## 2024-05-21 DIAGNOSIS — Z90.710 ACQUIRED ABSENCE OF BOTH CERVIX AND UTERUS: Chronic | ICD-10-CM

## 2024-05-21 DIAGNOSIS — Z86.69 PERSONAL HISTORY OF OTHER DISEASES OF THE NERVOUS SYSTEM AND SENSE ORGANS: Chronic | ICD-10-CM

## 2024-05-21 DIAGNOSIS — Z96.641 PRESENCE OF RIGHT ARTIFICIAL HIP JOINT: Chronic | ICD-10-CM

## 2024-05-21 LAB
ALBUMIN SERPL ELPH-MCNC: 3.8 G/DL — SIGNIFICANT CHANGE UP (ref 3.3–5)
ALP SERPL-CCNC: 65 U/L — SIGNIFICANT CHANGE UP (ref 40–120)
ALT FLD-CCNC: 22 U/L — SIGNIFICANT CHANGE UP (ref 10–45)
ANION GAP SERPL CALC-SCNC: 8 MMOL/L — SIGNIFICANT CHANGE UP (ref 5–17)
APTT BLD: 26.4 SEC — SIGNIFICANT CHANGE UP (ref 24.5–35.6)
AST SERPL-CCNC: 17 U/L — SIGNIFICANT CHANGE UP (ref 10–40)
BASOPHILS # BLD AUTO: 0.06 K/UL — SIGNIFICANT CHANGE UP (ref 0–0.2)
BASOPHILS NFR BLD AUTO: 0.5 % — SIGNIFICANT CHANGE UP (ref 0–2)
BILIRUB SERPL-MCNC: 0.4 MG/DL — SIGNIFICANT CHANGE UP (ref 0.2–1.2)
BUN SERPL-MCNC: 11 MG/DL — SIGNIFICANT CHANGE UP (ref 7–23)
CALCIUM SERPL-MCNC: 9.4 MG/DL — SIGNIFICANT CHANGE UP (ref 8.4–10.5)
CHLORIDE SERPL-SCNC: 95 MMOL/L — LOW (ref 96–108)
CO2 SERPL-SCNC: 27 MMOL/L — SIGNIFICANT CHANGE UP (ref 22–31)
CREAT SERPL-MCNC: 0.69 MG/DL — SIGNIFICANT CHANGE UP (ref 0.5–1.3)
EGFR: 91 ML/MIN/1.73M2 — SIGNIFICANT CHANGE UP
EOSINOPHIL # BLD AUTO: 0.15 K/UL — SIGNIFICANT CHANGE UP (ref 0–0.5)
EOSINOPHIL NFR BLD AUTO: 1.3 % — SIGNIFICANT CHANGE UP (ref 0–6)
GLUCOSE SERPL-MCNC: 113 MG/DL — HIGH (ref 70–99)
HCT VFR BLD CALC: 38.5 % — SIGNIFICANT CHANGE UP (ref 34.5–45)
HGB BLD-MCNC: 13.1 G/DL — SIGNIFICANT CHANGE UP (ref 11.5–15.5)
IMM GRANULOCYTES NFR BLD AUTO: 0.3 % — SIGNIFICANT CHANGE UP (ref 0–0.9)
INR BLD: 0.9 RATIO — SIGNIFICANT CHANGE UP (ref 0.85–1.18)
LIDOCAIN IGE QN: 73 U/L — SIGNIFICANT CHANGE UP (ref 16–77)
LYMPHOCYTES # BLD AUTO: 18.5 % — SIGNIFICANT CHANGE UP (ref 13–44)
LYMPHOCYTES # BLD AUTO: 2.1 K/UL — SIGNIFICANT CHANGE UP (ref 1–3.3)
MCHC RBC-ENTMCNC: 30 PG — SIGNIFICANT CHANGE UP (ref 27–34)
MCHC RBC-ENTMCNC: 34 GM/DL — SIGNIFICANT CHANGE UP (ref 32–36)
MCV RBC AUTO: 88.3 FL — SIGNIFICANT CHANGE UP (ref 80–100)
MONOCYTES # BLD AUTO: 0.92 K/UL — HIGH (ref 0–0.9)
MONOCYTES NFR BLD AUTO: 8.1 % — SIGNIFICANT CHANGE UP (ref 2–14)
NEUTROPHILS # BLD AUTO: 8.1 K/UL — HIGH (ref 1.8–7.4)
NEUTROPHILS NFR BLD AUTO: 71.3 % — SIGNIFICANT CHANGE UP (ref 43–77)
NRBC # BLD: 0 /100 WBCS — SIGNIFICANT CHANGE UP (ref 0–0)
NT-PROBNP SERPL-SCNC: 151 PG/ML — SIGNIFICANT CHANGE UP (ref 0–300)
PLATELET # BLD AUTO: 369 K/UL — SIGNIFICANT CHANGE UP (ref 150–400)
POTASSIUM SERPL-MCNC: 3.7 MMOL/L — SIGNIFICANT CHANGE UP (ref 3.5–5.3)
POTASSIUM SERPL-SCNC: 3.7 MMOL/L — SIGNIFICANT CHANGE UP (ref 3.5–5.3)
PROT SERPL-MCNC: 6.9 G/DL — SIGNIFICANT CHANGE UP (ref 6–8.3)
PROTHROM AB SERPL-ACNC: 10.6 SEC — SIGNIFICANT CHANGE UP (ref 9.5–13)
RBC # BLD: 4.36 M/UL — SIGNIFICANT CHANGE UP (ref 3.8–5.2)
RBC # FLD: 12.3 % — SIGNIFICANT CHANGE UP (ref 10.3–14.5)
SODIUM SERPL-SCNC: 130 MMOL/L — LOW (ref 135–145)
TROPONIN I, HIGH SENSITIVITY RESULT: 4.3 NG/L — SIGNIFICANT CHANGE UP
WBC # BLD: 11.36 K/UL — HIGH (ref 3.8–10.5)
WBC # FLD AUTO: 11.36 K/UL — HIGH (ref 3.8–10.5)

## 2024-05-21 PROCEDURE — 99285 EMERGENCY DEPT VISIT HI MDM: CPT

## 2024-05-21 PROCEDURE — 93010 ELECTROCARDIOGRAM REPORT: CPT

## 2024-05-21 PROCEDURE — 83880 ASSAY OF NATRIURETIC PEPTIDE: CPT

## 2024-05-21 PROCEDURE — 96360 HYDRATION IV INFUSION INIT: CPT

## 2024-05-21 PROCEDURE — 85610 PROTHROMBIN TIME: CPT

## 2024-05-21 PROCEDURE — 83690 ASSAY OF LIPASE: CPT

## 2024-05-21 PROCEDURE — 99285 EMERGENCY DEPT VISIT HI MDM: CPT | Mod: 25

## 2024-05-21 PROCEDURE — 36415 COLL VENOUS BLD VENIPUNCTURE: CPT

## 2024-05-21 PROCEDURE — 80053 COMPREHEN METABOLIC PANEL: CPT

## 2024-05-21 PROCEDURE — 71045 X-RAY EXAM CHEST 1 VIEW: CPT | Mod: 26

## 2024-05-21 PROCEDURE — 71045 X-RAY EXAM CHEST 1 VIEW: CPT

## 2024-05-21 PROCEDURE — 85025 COMPLETE CBC W/AUTO DIFF WBC: CPT

## 2024-05-21 PROCEDURE — 84484 ASSAY OF TROPONIN QUANT: CPT

## 2024-05-21 PROCEDURE — 85730 THROMBOPLASTIN TIME PARTIAL: CPT

## 2024-05-21 PROCEDURE — 93005 ELECTROCARDIOGRAM TRACING: CPT

## 2024-05-21 RX ORDER — SODIUM CHLORIDE 9 MG/ML
1000 INJECTION INTRAMUSCULAR; INTRAVENOUS; SUBCUTANEOUS ONCE
Refills: 0 | Status: COMPLETED | OUTPATIENT
Start: 2024-05-21 | End: 2024-05-21

## 2024-05-21 RX ORDER — ALPRAZOLAM 0.25 MG
0.5 TABLET ORAL ONCE
Refills: 0 | Status: DISCONTINUED | OUTPATIENT
Start: 2024-05-21 | End: 2024-05-21

## 2024-05-21 RX ADMIN — Medication 0.5 MILLIGRAM(S): at 11:00

## 2024-05-21 RX ADMIN — SODIUM CHLORIDE 1000 MILLILITER(S): 9 INJECTION INTRAMUSCULAR; INTRAVENOUS; SUBCUTANEOUS at 10:09

## 2024-05-21 RX ADMIN — SODIUM CHLORIDE 1000 MILLILITER(S): 9 INJECTION INTRAMUSCULAR; INTRAVENOUS; SUBCUTANEOUS at 11:09

## 2024-05-21 NOTE — ED PROVIDER NOTE - CLINICAL SUMMARY MEDICAL DECISION MAKING FREE TEXT BOX
73 year old female with dizziness, sense of dehydration, labs reviewed- Na 130 otherwise acceptable, received NS 1 liter, patient is overwhelmed with her  care, took xanax in the ED, took nap, feels much improved, symptoms resolved, wants to be discharged

## 2024-05-21 NOTE — ED ADULT NURSE REASSESSMENT NOTE - NS ED NURSE REASSESS COMMENT FT1
Patient being brought to bathroom via wheelchair. Got some sleep after being medicated. Feeling so much clarity that she hasn't felt in weeks. Happy to be getting discharged. Spoke with mother and reviewed negative xray results.

## 2024-05-21 NOTE — ED PROVIDER NOTE - PATIENT PORTAL LINK FT
You can access the FollowMyHealth Patient Portal offered by [facility] by registering at the following website: [email or web address]. By joining [employer]’s FollowMyHealth portal, you will also be able to view your health information using other applications (apps) compatible with our system.

## 2024-05-21 NOTE — GOALS OF CARE CONVERSATION - ADVANCED CARE PLANNING - CONVERSATION DETAILS
Met with patient at bedside. She is A&Ox3. She is here from home with chest pain. Her  Frandy Carbajal is also in the ED. Pt. has hx. loop recorder, cardiac ablation, afib, HTN, anxiety and depression. States her PCP is Dr. Corbin in [place]. She signed a HCP form designating her  Frandy as HCP and his niece Anitha Kenyon as alternate agent. Copy placed in chart and original given to the patient.  We also discussed GOC. She signed a MOLST: DNR/DNI, trial of niv, send to hospital, allow trial of IV fluids and abx. No use of feeding tube or HD. MOLST and copy placed in chart.

## 2024-05-21 NOTE — ED ADULT NURSE REASSESSMENT NOTE - NS ED NURSE REASSESS COMMENT FT1
Patient anxious, very concerned for . Patient called niece who is 2nd health care proxy. Patient alert and oriented, vitals stable. Provided with warm blankets and tissues. Going to be medicated as per MD order with home dose of xanax for anxiety.

## 2024-05-21 NOTE — ED ADULT NURSE NOTE - NSFALLRISKINTERV_ED_ALL_ED
Assistance OOB with selected safe patient handling equipment if applicable/Assistance with ambulation/Communicate fall risk and risk factors to all staff, patient, and family/Monitor gait and stability/Provide visual cue: yellow wristband, yellow gown, etc/Reinforce activity limits and safety measures with patient and family/Call bell, personal items and telephone in reach/Instruct patient to call for assistance before getting out of bed/chair/stretcher/Non-slip footwear applied when patient is off stretcher/[place] to call system/Physically safe environment - no spills, clutter or unnecessary equipment/Purposeful Proactive Rounding/Room/bathroom lighting operational, light cord in reach

## 2024-05-21 NOTE — ED ADULT NURSE NOTE - NSICDXPASTSURGICALHX_GEN_ALL_CORE_FT
PAST SURGICAL HISTORY:  H/O cataract bilateral, 2011    History of breast lump removal left, age 31    History of right hip replacement 2016    S/P hysterectomy 2012    Status post Mohs surgery after 2010's    
No

## 2024-05-21 NOTE — ED PROVIDER NOTE - OBJECTIVE STATEMENT
73 year old female with chest pressure, dizziness, feels dehydrated. States she has been feeling this way for a few weeks, and it got worse this AM to the point that she could  barely stand up. Reports nausea, denies VD, dysuria, cough, abdominal/back/neck pain, HA, focal weakness/numbness. Denies any other symptoms. She has been taking care of her  at home, who had ileostomy issues s/s his UC.

## 2024-06-02 ENCOUNTER — EMERGENCY (EMERGENCY)
Facility: HOSPITAL | Age: 74
LOS: 1 days | Discharge: HOME | End: 2024-06-02
Attending: STUDENT IN AN ORGANIZED HEALTH CARE EDUCATION/TRAINING PROGRAM | Admitting: STUDENT IN AN ORGANIZED HEALTH CARE EDUCATION/TRAINING PROGRAM
Payer: MEDICARE

## 2024-06-02 VITALS
DIASTOLIC BLOOD PRESSURE: 72 MMHG | SYSTOLIC BLOOD PRESSURE: 139 MMHG | OXYGEN SATURATION: 100 % | RESPIRATION RATE: 19 BRPM | HEART RATE: 78 BPM

## 2024-06-02 VITALS
TEMPERATURE: 98 F | RESPIRATION RATE: 18 BRPM | OXYGEN SATURATION: 99 % | DIASTOLIC BLOOD PRESSURE: 92 MMHG | HEIGHT: 62 IN | HEART RATE: 79 BPM | SYSTOLIC BLOOD PRESSURE: 168 MMHG | WEIGHT: 119.93 LBS

## 2024-06-02 DIAGNOSIS — Z86.69 PERSONAL HISTORY OF OTHER DISEASES OF THE NERVOUS SYSTEM AND SENSE ORGANS: Chronic | ICD-10-CM

## 2024-06-02 DIAGNOSIS — Z98.890 OTHER SPECIFIED POSTPROCEDURAL STATES: Chronic | ICD-10-CM

## 2024-06-02 DIAGNOSIS — Z96.641 PRESENCE OF RIGHT ARTIFICIAL HIP JOINT: Chronic | ICD-10-CM

## 2024-06-02 DIAGNOSIS — Z90.710 ACQUIRED ABSENCE OF BOTH CERVIX AND UTERUS: Chronic | ICD-10-CM

## 2024-06-02 LAB
ALBUMIN SERPL ELPH-MCNC: 3.7 G/DL — SIGNIFICANT CHANGE UP (ref 3.3–5)
ALP SERPL-CCNC: 78 U/L — SIGNIFICANT CHANGE UP (ref 40–120)
ALT FLD-CCNC: 25 U/L — SIGNIFICANT CHANGE UP (ref 10–45)
ANION GAP SERPL CALC-SCNC: 5 MMOL/L — SIGNIFICANT CHANGE UP (ref 5–17)
AST SERPL-CCNC: 15 U/L — SIGNIFICANT CHANGE UP (ref 10–40)
BASOPHILS # BLD AUTO: 0.06 K/UL — SIGNIFICANT CHANGE UP (ref 0–0.2)
BASOPHILS NFR BLD AUTO: 0.7 % — SIGNIFICANT CHANGE UP (ref 0–2)
BILIRUB SERPL-MCNC: 0.4 MG/DL — SIGNIFICANT CHANGE UP (ref 0.2–1.2)
BUN SERPL-MCNC: 10 MG/DL — SIGNIFICANT CHANGE UP (ref 7–23)
CALCIUM SERPL-MCNC: 9.5 MG/DL — SIGNIFICANT CHANGE UP (ref 8.4–10.5)
CHLORIDE SERPL-SCNC: 98 MMOL/L — SIGNIFICANT CHANGE UP (ref 96–108)
CO2 SERPL-SCNC: 31 MMOL/L — SIGNIFICANT CHANGE UP (ref 22–31)
CREAT SERPL-MCNC: 0.65 MG/DL — SIGNIFICANT CHANGE UP (ref 0.5–1.3)
EGFR: 92 ML/MIN/1.73M2 — SIGNIFICANT CHANGE UP
EOSINOPHIL # BLD AUTO: 0.17 K/UL — SIGNIFICANT CHANGE UP (ref 0–0.5)
EOSINOPHIL NFR BLD AUTO: 1.8 % — SIGNIFICANT CHANGE UP (ref 0–6)
GLUCOSE SERPL-MCNC: 111 MG/DL — HIGH (ref 70–99)
HCT VFR BLD CALC: 36.2 % — SIGNIFICANT CHANGE UP (ref 34.5–45)
HGB BLD-MCNC: 12.8 G/DL — SIGNIFICANT CHANGE UP (ref 11.5–15.5)
IMM GRANULOCYTES NFR BLD AUTO: 0.3 % — SIGNIFICANT CHANGE UP (ref 0–0.9)
LYMPHOCYTES # BLD AUTO: 2.08 K/UL — SIGNIFICANT CHANGE UP (ref 1–3.3)
LYMPHOCYTES # BLD AUTO: 22.6 % — SIGNIFICANT CHANGE UP (ref 13–44)
MAGNESIUM SERPL-MCNC: 2.1 MG/DL — SIGNIFICANT CHANGE UP (ref 1.6–2.6)
MCHC RBC-ENTMCNC: 31.3 PG — SIGNIFICANT CHANGE UP (ref 27–34)
MCHC RBC-ENTMCNC: 35.4 GM/DL — SIGNIFICANT CHANGE UP (ref 32–36)
MCV RBC AUTO: 88.5 FL — SIGNIFICANT CHANGE UP (ref 80–100)
MONOCYTES # BLD AUTO: 0.65 K/UL — SIGNIFICANT CHANGE UP (ref 0–0.9)
MONOCYTES NFR BLD AUTO: 7.1 % — SIGNIFICANT CHANGE UP (ref 2–14)
NEUTROPHILS # BLD AUTO: 6.22 K/UL — SIGNIFICANT CHANGE UP (ref 1.8–7.4)
NEUTROPHILS NFR BLD AUTO: 67.5 % — SIGNIFICANT CHANGE UP (ref 43–77)
NRBC # BLD: 0 /100 WBCS — SIGNIFICANT CHANGE UP (ref 0–0)
PLATELET # BLD AUTO: 348 K/UL — SIGNIFICANT CHANGE UP (ref 150–400)
POTASSIUM SERPL-MCNC: 4.4 MMOL/L — SIGNIFICANT CHANGE UP (ref 3.5–5.3)
POTASSIUM SERPL-SCNC: 4.4 MMOL/L — SIGNIFICANT CHANGE UP (ref 3.5–5.3)
PROT SERPL-MCNC: 6.6 G/DL — SIGNIFICANT CHANGE UP (ref 6–8.3)
RBC # BLD: 4.09 M/UL — SIGNIFICANT CHANGE UP (ref 3.8–5.2)
RBC # FLD: 12.3 % — SIGNIFICANT CHANGE UP (ref 10.3–14.5)
SODIUM SERPL-SCNC: 134 MMOL/L — LOW (ref 135–145)
TROPONIN I, HIGH SENSITIVITY RESULT: 4.3 NG/L — SIGNIFICANT CHANGE UP
WBC # BLD: 9.21 K/UL — SIGNIFICANT CHANGE UP (ref 3.8–10.5)
WBC # FLD AUTO: 9.21 K/UL — SIGNIFICANT CHANGE UP (ref 3.8–10.5)

## 2024-06-02 PROCEDURE — 99284 EMERGENCY DEPT VISIT MOD MDM: CPT

## 2024-06-02 PROCEDURE — 93005 ELECTROCARDIOGRAM TRACING: CPT

## 2024-06-02 PROCEDURE — 80053 COMPREHEN METABOLIC PANEL: CPT

## 2024-06-02 PROCEDURE — 93010 ELECTROCARDIOGRAM REPORT: CPT

## 2024-06-02 PROCEDURE — 83735 ASSAY OF MAGNESIUM: CPT

## 2024-06-02 PROCEDURE — 99284 EMERGENCY DEPT VISIT MOD MDM: CPT | Mod: 25

## 2024-06-02 PROCEDURE — 84484 ASSAY OF TROPONIN QUANT: CPT

## 2024-06-02 PROCEDURE — 36415 COLL VENOUS BLD VENIPUNCTURE: CPT

## 2024-06-02 PROCEDURE — 85025 COMPLETE CBC W/AUTO DIFF WBC: CPT

## 2024-06-02 RX ORDER — SODIUM CHLORIDE 9 MG/ML
1000 INJECTION INTRAMUSCULAR; INTRAVENOUS; SUBCUTANEOUS ONCE
Refills: 0 | Status: COMPLETED | OUTPATIENT
Start: 2024-06-02 | End: 2024-06-02

## 2024-06-02 RX ADMIN — SODIUM CHLORIDE 1000 MILLILITER(S): 9 INJECTION INTRAMUSCULAR; INTRAVENOUS; SUBCUTANEOUS at 11:00

## 2024-06-02 NOTE — ED PROVIDER NOTE - CLINICAL SUMMARY MEDICAL DECISION MAKING FREE TEXT BOX
This patient presents with generalized weakness and fatigue, trouble walking  Considered alternate etiologies of the patient’s symptoms including infectious processes, severe metabolic derangements or electrolyte abnormalities, ischemia/ACS, heart failure, and intracranial/central processes but think these are unlikely given the history and physical exam.  Plan: labs, pain/nausea control, reassessment,  ambulatory trial in the ED.

## 2024-06-02 NOTE — ED ADULT NURSE NOTE - NSFALLRISKINTERV_ED_ALL_ED

## 2024-06-02 NOTE — ED ADULT TRIAGE NOTE - CHIEF COMPLAINT QUOTE
Patient BIB EMS from home complaining of abdominal pain, nausea, dizziness, & weakness x1 week. Patient was seen here in  ED with same symptoms 1 week ago.

## 2024-06-02 NOTE — ED PROVIDER NOTE - OBJECTIVE STATEMENT
73y F PMH of Afib, vertigo, cardiac radiofrequency ablation 2021, HTN, HLD, legally blind b/l, osteoarthritis p/w multiple symptoms  as oppose to triage note, pt states She is having episodes of dizziness for months.   States that also she has episodes of weakness where she cellulitis she cannot walk.  Was seen in the ER 2 weeks ago for similar symptoms.  Feels like she is dehydrated.  Checked her blood pressure today which was elevated and came to the ER.  Patient states that she is under a lot of stress about life, about her husbands medical conditions. tearful crying during interview.  States "I didn't know what to do, I cant do this anymore". denies any recent cp, sob, vomiting, diarrhea, abd pain  tolerating PO but states she doesn't have an appetite. no falls, able to move around home.

## 2024-06-02 NOTE — ED PROVIDER NOTE - PATIENT PORTAL LINK FT
You can access the FollowMyHealth Patient Portal offered by [facility] by registering at the following website: [email or web address]. By joining [employer]’s FollowMyHealth portal, you will also be able to view your health information using other applications (apps) compatible with our system.

## 2024-06-12 NOTE — CHART NOTE - NSCHARTNOTEFT_GEN_A_CORE
73 y o presenting to the ED on 6/2/24 complaining of abdominal pain.  SW made a courtesy call to assist with scheduling the recommended primary care appointment and received no answer.  Contact information was provided via voicemail.

## 2024-06-16 ENCOUNTER — EMERGENCY (EMERGENCY)
Facility: HOSPITAL | Age: 74
LOS: 0 days | Discharge: HOME | End: 2024-06-16
Attending: EMERGENCY MEDICINE
Payer: MEDICARE

## 2024-06-16 VITALS
RESPIRATION RATE: 18 BRPM | HEART RATE: 96 BPM | SYSTOLIC BLOOD PRESSURE: 137 MMHG | OXYGEN SATURATION: 96 % | DIASTOLIC BLOOD PRESSURE: 75 MMHG | TEMPERATURE: 98 F

## 2024-06-16 VITALS
RESPIRATION RATE: 18 BRPM | SYSTOLIC BLOOD PRESSURE: 148 MMHG | OXYGEN SATURATION: 97 % | DIASTOLIC BLOOD PRESSURE: 86 MMHG | TEMPERATURE: 98 F | HEART RATE: 86 BPM | HEIGHT: 62 IN

## 2024-06-16 DIAGNOSIS — Z86.69 PERSONAL HISTORY OF OTHER DISEASES OF THE NERVOUS SYSTEM AND SENSE ORGANS: Chronic | ICD-10-CM

## 2024-06-16 DIAGNOSIS — E78.5 HYPERLIPIDEMIA, UNSPECIFIED: ICD-10-CM

## 2024-06-16 DIAGNOSIS — Z98.890 OTHER SPECIFIED POSTPROCEDURAL STATES: Chronic | ICD-10-CM

## 2024-06-16 DIAGNOSIS — F32.A DEPRESSION, UNSPECIFIED: ICD-10-CM

## 2024-06-16 DIAGNOSIS — Z79.01 LONG TERM (CURRENT) USE OF ANTICOAGULANTS: ICD-10-CM

## 2024-06-16 DIAGNOSIS — R53.1 WEAKNESS: ICD-10-CM

## 2024-06-16 DIAGNOSIS — I48.91 UNSPECIFIED ATRIAL FIBRILLATION: ICD-10-CM

## 2024-06-16 DIAGNOSIS — Z96.641 PRESENCE OF RIGHT ARTIFICIAL HIP JOINT: Chronic | ICD-10-CM

## 2024-06-16 DIAGNOSIS — Z20.822 CONTACT WITH AND (SUSPECTED) EXPOSURE TO COVID-19: ICD-10-CM

## 2024-06-16 DIAGNOSIS — F41.9 ANXIETY DISORDER, UNSPECIFIED: ICD-10-CM

## 2024-06-16 DIAGNOSIS — Z88.2 ALLERGY STATUS TO SULFONAMIDES: ICD-10-CM

## 2024-06-16 DIAGNOSIS — Z90.710 ACQUIRED ABSENCE OF BOTH CERVIX AND UTERUS: Chronic | ICD-10-CM

## 2024-06-16 DIAGNOSIS — I10 ESSENTIAL (PRIMARY) HYPERTENSION: ICD-10-CM

## 2024-06-16 DIAGNOSIS — K59.00 CONSTIPATION, UNSPECIFIED: ICD-10-CM

## 2024-06-16 LAB
ALBUMIN SERPL ELPH-MCNC: 3.8 G/DL — SIGNIFICANT CHANGE UP (ref 3.3–5)
ALP SERPL-CCNC: 77 U/L — SIGNIFICANT CHANGE UP (ref 40–120)
ALT FLD-CCNC: 22 U/L — SIGNIFICANT CHANGE UP (ref 12–78)
ANION GAP SERPL CALC-SCNC: 8 MMOL/L — SIGNIFICANT CHANGE UP (ref 5–17)
APAP SERPL-MCNC: <2 UG/ML — LOW (ref 10–30)
APPEARANCE UR: CLEAR — SIGNIFICANT CHANGE UP
AST SERPL-CCNC: 16 U/L — SIGNIFICANT CHANGE UP (ref 15–37)
BACTERIA # UR AUTO: NEGATIVE /HPF — SIGNIFICANT CHANGE UP
BASOPHILS # BLD AUTO: 0.05 K/UL — SIGNIFICANT CHANGE UP (ref 0–0.2)
BASOPHILS NFR BLD AUTO: 0.5 % — SIGNIFICANT CHANGE UP (ref 0–2)
BILIRUB SERPL-MCNC: 0.4 MG/DL — SIGNIFICANT CHANGE UP (ref 0.2–1.2)
BILIRUB UR-MCNC: NEGATIVE — SIGNIFICANT CHANGE UP
BUN SERPL-MCNC: 6 MG/DL — LOW (ref 7–23)
CALCIUM SERPL-MCNC: 9.6 MG/DL — SIGNIFICANT CHANGE UP (ref 8.5–10.1)
CAST: 0 /LPF — SIGNIFICANT CHANGE UP (ref 0–4)
CHLORIDE SERPL-SCNC: 104 MMOL/L — SIGNIFICANT CHANGE UP (ref 96–108)
CO2 SERPL-SCNC: 26 MMOL/L — SIGNIFICANT CHANGE UP (ref 22–31)
COLOR SPEC: YELLOW — SIGNIFICANT CHANGE UP
CREAT SERPL-MCNC: 0.59 MG/DL — SIGNIFICANT CHANGE UP (ref 0.5–1.3)
DIFF PNL FLD: ABNORMAL
EGFR: 95 ML/MIN/1.73M2 — SIGNIFICANT CHANGE UP
EOSINOPHIL # BLD AUTO: 0.18 K/UL — SIGNIFICANT CHANGE UP (ref 0–0.5)
EOSINOPHIL NFR BLD AUTO: 1.7 % — SIGNIFICANT CHANGE UP (ref 0–6)
ETHANOL SERPL-MCNC: <10 MG/DL — SIGNIFICANT CHANGE UP (ref 0–10)
FLUAV AG NPH QL: SIGNIFICANT CHANGE UP
FLUBV AG NPH QL: SIGNIFICANT CHANGE UP
GLUCOSE SERPL-MCNC: 109 MG/DL — HIGH (ref 70–99)
GLUCOSE UR QL: NEGATIVE MG/DL — SIGNIFICANT CHANGE UP
HCT VFR BLD CALC: 38.6 % — SIGNIFICANT CHANGE UP (ref 34.5–45)
HGB BLD-MCNC: 13.1 G/DL — SIGNIFICANT CHANGE UP (ref 11.5–15.5)
IMM GRANULOCYTES NFR BLD AUTO: 0.3 % — SIGNIFICANT CHANGE UP (ref 0–0.9)
KETONES UR-MCNC: 15 MG/DL
LEUKOCYTE ESTERASE UR-ACNC: NEGATIVE — SIGNIFICANT CHANGE UP
LYMPHOCYTES # BLD AUTO: 1.8 K/UL — SIGNIFICANT CHANGE UP (ref 1–3.3)
LYMPHOCYTES # BLD AUTO: 17.2 % — SIGNIFICANT CHANGE UP (ref 13–44)
MAGNESIUM SERPL-MCNC: 2 MG/DL — SIGNIFICANT CHANGE UP (ref 1.6–2.6)
MCHC RBC-ENTMCNC: 30 PG — SIGNIFICANT CHANGE UP (ref 27–34)
MCHC RBC-ENTMCNC: 33.9 GM/DL — SIGNIFICANT CHANGE UP (ref 32–36)
MCV RBC AUTO: 88.3 FL — SIGNIFICANT CHANGE UP (ref 80–100)
MONOCYTES # BLD AUTO: 0.89 K/UL — SIGNIFICANT CHANGE UP (ref 0–0.9)
MONOCYTES NFR BLD AUTO: 8.5 % — SIGNIFICANT CHANGE UP (ref 2–14)
NEUTROPHILS # BLD AUTO: 7.49 K/UL — HIGH (ref 1.8–7.4)
NEUTROPHILS NFR BLD AUTO: 71.8 % — SIGNIFICANT CHANGE UP (ref 43–77)
NITRITE UR-MCNC: NEGATIVE — SIGNIFICANT CHANGE UP
PH UR: 6.5 — SIGNIFICANT CHANGE UP (ref 5–8)
PHOSPHATE SERPL-MCNC: 3.3 MG/DL — SIGNIFICANT CHANGE UP (ref 2.5–4.5)
PLATELET # BLD AUTO: 352 K/UL — SIGNIFICANT CHANGE UP (ref 150–400)
POTASSIUM SERPL-MCNC: 3.8 MMOL/L — SIGNIFICANT CHANGE UP (ref 3.5–5.3)
POTASSIUM SERPL-SCNC: 3.8 MMOL/L — SIGNIFICANT CHANGE UP (ref 3.5–5.3)
PROT SERPL-MCNC: 6.8 GM/DL — SIGNIFICANT CHANGE UP (ref 6–8.3)
PROT UR-MCNC: NEGATIVE MG/DL — SIGNIFICANT CHANGE UP
RBC # BLD: 4.37 M/UL — SIGNIFICANT CHANGE UP (ref 3.8–5.2)
RBC # FLD: 12.5 % — SIGNIFICANT CHANGE UP (ref 10.3–14.5)
RBC CASTS # UR COMP ASSIST: 3 /HPF — SIGNIFICANT CHANGE UP (ref 0–4)
RSV RNA NPH QL NAA+NON-PROBE: SIGNIFICANT CHANGE UP
SALICYLATES SERPL-MCNC: <1.7 MG/DL — LOW (ref 2.8–20)
SARS-COV-2 RNA SPEC QL NAA+PROBE: SIGNIFICANT CHANGE UP
SODIUM SERPL-SCNC: 138 MMOL/L — SIGNIFICANT CHANGE UP (ref 135–145)
SP GR SPEC: >1.03 — HIGH (ref 1–1.03)
SQUAMOUS # UR AUTO: 2 /HPF — SIGNIFICANT CHANGE UP (ref 0–5)
UROBILINOGEN FLD QL: 1 MG/DL — SIGNIFICANT CHANGE UP (ref 0.2–1)
WBC # BLD: 10.44 K/UL — SIGNIFICANT CHANGE UP (ref 3.8–10.5)
WBC # FLD AUTO: 10.44 K/UL — SIGNIFICANT CHANGE UP (ref 3.8–10.5)
WBC UR QL: 2 /HPF — SIGNIFICANT CHANGE UP (ref 0–5)

## 2024-06-16 PROCEDURE — 36000 PLACE NEEDLE IN VEIN: CPT | Mod: XU

## 2024-06-16 PROCEDURE — 80307 DRUG TEST PRSMV CHEM ANLYZR: CPT

## 2024-06-16 PROCEDURE — 71045 X-RAY EXAM CHEST 1 VIEW: CPT | Mod: 26

## 2024-06-16 PROCEDURE — 80053 COMPREHEN METABOLIC PANEL: CPT

## 2024-06-16 PROCEDURE — 71045 X-RAY EXAM CHEST 1 VIEW: CPT

## 2024-06-16 PROCEDURE — 99284 EMERGENCY DEPT VISIT MOD MDM: CPT | Mod: 25

## 2024-06-16 PROCEDURE — 70450 CT HEAD/BRAIN W/O DYE: CPT | Mod: MC

## 2024-06-16 PROCEDURE — 36415 COLL VENOUS BLD VENIPUNCTURE: CPT

## 2024-06-16 PROCEDURE — 84100 ASSAY OF PHOSPHORUS: CPT

## 2024-06-16 PROCEDURE — 70450 CT HEAD/BRAIN W/O DYE: CPT | Mod: 26,MC

## 2024-06-16 PROCEDURE — 99283 EMERGENCY DEPT VISIT LOW MDM: CPT

## 2024-06-16 PROCEDURE — 85025 COMPLETE CBC W/AUTO DIFF WBC: CPT

## 2024-06-16 PROCEDURE — 74177 CT ABD & PELVIS W/CONTRAST: CPT | Mod: MC

## 2024-06-16 PROCEDURE — 74177 CT ABD & PELVIS W/CONTRAST: CPT | Mod: 26,MC

## 2024-06-16 PROCEDURE — 0241U: CPT

## 2024-06-16 PROCEDURE — 83735 ASSAY OF MAGNESIUM: CPT

## 2024-06-16 PROCEDURE — 81001 URINALYSIS AUTO W/SCOPE: CPT

## 2024-06-16 PROCEDURE — 99285 EMERGENCY DEPT VISIT HI MDM: CPT

## 2024-06-16 RX ORDER — MIRTAZAPINE 45 MG/1
1 TABLET, ORALLY DISINTEGRATING ORAL
Qty: 30 | Refills: 0
Start: 2024-06-16 | End: 2024-07-15

## 2024-06-16 RX ORDER — SODIUM CHLORIDE 9 MG/ML
500 INJECTION INTRAMUSCULAR; INTRAVENOUS; SUBCUTANEOUS ONCE
Refills: 0 | Status: COMPLETED | OUTPATIENT
Start: 2024-06-16 | End: 2024-06-16

## 2024-06-16 RX ORDER — ALPRAZOLAM 0.25 MG
0.25 TABLET ORAL ONCE
Refills: 0 | Status: DISCONTINUED | OUTPATIENT
Start: 2024-06-16 | End: 2024-06-16

## 2024-06-16 RX ADMIN — SODIUM CHLORIDE 1000 MILLILITER(S): 9 INJECTION INTRAMUSCULAR; INTRAVENOUS; SUBCUTANEOUS at 11:04

## 2024-06-16 RX ADMIN — Medication 0.25 MILLIGRAM(S): at 11:03

## 2024-06-16 NOTE — ED ADULT NURSE NOTE - NSFALLRISKFACTORS_ED_ALL_ED
weakness/Coagulation: Bleeding disorder either through use of anticoagulants or underlying clinical condition(s)

## 2024-06-16 NOTE — ED PROVIDER NOTE - NSFOLLOWUPINSTRUCTIONS_ED_ALL_ED_FT
Please call and follow up with your doctor in 1-3 days.    Take mirtazapine 7.5 mg oral tablet at night.    Return to the Emergency Department for worsening or persistent symptoms, and/or ANY NEW OR CONCERNING SYMPTOMS. If you have issues obtaining follow up, please call: [phone] or [phone]  to obtain a doctor or specialist who takes your insurance in your area.    Anxiety    WHAT YOU NEED TO KNOW:    Anxiety is a condition that causes you to feel extremely worried or nervous. The feelings are so strong that they can cause problems with your daily activities or sleep. Anxiety may be triggered by something you fear, or it may happen without a cause. Family or work stress, smoking, caffeine, and alcohol can increase your risk for anxiety. Certain medicines or health conditions can also increase your risk. Anxiety can become a long-term condition if it is not managed or treated.     DISCHARGE INSTRUCTIONS:    Call 911 if:     You have chest pain, tightness, or heaviness that may spread to your shoulders, arms, jaw, neck, or back.      You feel like hurting yourself or someone else.     Contact your healthcare provider if:     Your symptoms get worse or do not get better with treatment.      Your anxiety keeps you from doing your regular daily activities.      You have new symptoms since your last visit.      You have questions or concerns about your condition or care.    Medicines:     Medicines may be given to help you feel more calm and relaxed, and decrease your symptoms.      Take your medicine as directed. Contact your healthcare provider if you think your medicine is not helping or if you have side effects. Tell him of her if you are allergic to any medicine. Keep a list of the medicines, vitamins, and herbs you take. Include the amounts, and when and why you take them. Bring the list or the pill bottles to follow-up visits. Carry your medicine list with you in case of an emergency.    Follow up with your healthcare provider within 2 weeks or as directed: Write down your questions so you remember to ask them during your visits.    Manage anxiety:     Talk to someone about your anxiety. Your healthcare provider may suggest counseling. Cognitive behavioral therapy can help you understand and change how you react to events that trigger your symptoms. You might feel more comfortable talking with a friend or family member about your anxiety. Choose someone you know will be supportive and encouraging.      Find ways to relax. Activities such as exercise, meditation, or listening to music can help you relax. Spend time with friends, or do things you enjoy.      Practice deep breathing. Deep breathing can help you relax when you feel anxious. Focus on taking slow, deep breaths several times a day, or during an anxiety attack. Breathe in through your nose and out through your mouth.       Create a regular sleep routine. Regular sleep can help you feel calmer during the day. Go to sleep and wake up at the same times every day. Do not watch television or use the computer right before bed. Your room should be comfortable, dark, and quiet.       Eat a variety of healthy foods. Healthy foods include fruits, vegetables, low-fat dairy products, lean meats, fish, whole-grain breads, and cooked beans. Healthy foods can help you feel less anxious and have more energy.      Exercise regularly. Exercise can increase your energy level. Exercise may also lift your mood and help you sleep better. Your healthcare provider can help you create an exercise plan.      Do not smoke. Nicotine and other chemicals in cigarettes and cigars can increase anxiety. Ask your healthcare provider for information if you currently smoke and need help to quit. E-cigarettes or smokeless tobacco still contain nicotine. Talk to your healthcare provider before you use these products.       Do not have caffeine. Caffeine can make your symptoms worse. Do not have foods or drinks that are meant to increase your energy level.      Limit or do not drink alcohol. Ask your healthcare provider if alcohol is safe for you. You may not be able to drink alcohol if you take certain anxiety or depression medicines. Limit alcohol to 1 drink per day if you are a woman. Limit alcohol to 2 drinks per day if you are a man. A drink of alcohol is 12 ounces of beer, 5 ounces of wine, or 1½ ounces of liquor.       Do not use drugs. Drugs can make your anxiety worse. It can also make anxiety hard to manage. Talk to your healthcare provider if you use drugs and want help to quit.      Constipation    WHAT YOU NEED TO KNOW:    Constipation is when you have hard, dry bowel movements, or you go longer than usual between bowel movements.     DISCHARGE INSTRUCTIONS:    Return to the emergency department if:     You have blood in your bowel movements.      You have a fever and abdominal pain with the constipation.    Contact your healthcare provider if:     Your constipation gets worse.       You start to vomit.      You have questions or concerns about your condition or care.    Medicines:     Medicine such as a laxative may help relax and loosen your intestines to help you have a bowel movement. Your provider may recommend you only use laxatives for a short time. Long-term use may make your bowels dependent on the medicine.      Take your medicine as directed. Contact your healthcare provider if you think your medicine is not helping or if you have side effects. Tell him of her if you are allergic to any medicine. Keep a list of the medicines, vitamins, and herbs you take. Include the amounts, and when and why you take them. Bring the list or the pill bottles to follow-up visits. Carry your medicine list with you in case of an emergency.    Relieve constipation:     A suppository may be used to help soften your bowel movements. This may make them easier to pass. A suppository is guided into your rectum through your anus.Suppository for Constipation           An enema is liquid medicine used to clear bowel movement from your rectum. The medicine is put into your rectum through your anus.Enemas         Prevent constipation:     Drink liquids as directed. You may need to drink extra liquids to help soften and move your bowels. Ask how much liquid to drink each day and which liquids are best for you.       Eat high-fiber foods. This may help decrease constipation by adding bulk to your bowel movements. High-fiber foods include fruit, vegetables, whole-grain breads and cereals, and beans. Your healthcare provider or dietitian can help you create a high-fiber meal plan. Your provider may also recommend a fiber supplement if you cannot get enough fiber from food.            Exercise regularly. Regular physical activity can help stimulate your intestines. Ask which exercises are best for you.      Schedule a time each day to have a bowel movement. This may help train your body to have regular bowel movements. Bend forward while you are on the toilet to help move the bowel movement out. Sit on the toilet for at least 10 minutes, even if you do not have a bowel movement.     Follow up with your healthcare provider as directed: Write down your questions so you remember to ask them during your visits.

## 2024-06-16 NOTE — ED PROVIDER NOTE - NS ED SCRIBE STATEMENT
Subjective:      Patient ID: Ronel Gonzalez is a 75 y.o. female. Here to follow BP. Her daughter passed suddenly. Things turned up-side down  Her brother and wife came to town to help. Other brother is coming and her third brother is on schedule to help. Feels generally OK physically. Luis Wynne for pain     Dr Silviano Angel for nephrology    Saw urology and they rx Myrbetric and it did help    Questions need for her follow up colonoscopy. Hx polyps            Review of Systems   Constitutional: Negative for activity change, appetite change and unexpected weight change. Respiratory: Negative for cough, shortness of breath and wheezing. Cardiovascular: Negative for chest pain and palpitations. Gastrointestinal:        Urology thought some rectal prolapse ? Genitourinary: Negative for hematuria. Dribbles and mixed incontinence. Musculoskeletal: Positive for arthralgias and back pain. Neurological: Negative for seizures, facial asymmetry and speech difficulty. Psychiatric/Behavioral: The patient is nervous/anxious. Brief reaction secondary to the death of her daughter. Has strong family support. Seems a little dull and affect, almost like in shell-shock. No overt thought disorder. Objective:   Physical Exam   Constitutional: She is oriented to person, place, and time. She appears well-developed and well-nourished. No distress. Cardiovascular: Normal rate, regular rhythm and normal heart sounds. Pulmonary/Chest: No respiratory distress. She has no wheezes. She has no rales. Musculoskeletal: She exhibits edema. Neurological: She is alert and oriented to person, place, and time. No cranial nerve deficit. Psychiatric: Her behavior is normal. Thought content normal.   Seems a little almost in mild shock. Speech and thought clear. Has family support, but seems and feels overwhelmed.        Assessment:          Acute grief reaction      Hypertension, treated Attending

## 2024-06-16 NOTE — ED PROVIDER NOTE - NSICDXFAMILYHX_GEN_ALL_CORE_FT
Psychiatric Assessment    Patient: Miko Hsu Date: 2019   : 2006 Attending: Fox Johnson MD   13 year old male      Chief complaint: \"anger and impulsivity.\"    History of Presenting Illness: He says he would blurt out and say things without thinking. Denies hitting others or throwing or breaking things.  He does not think he is angry a lot. He usually knows why.  He says he has a lot of control over doing anything physical. He got physical with kids twice during the past school year. He says his control is often not much. Listening to music helps which is allowed at school.  He was starting to write say something down first in partial. He got most of schoolwork done, struggled more in English/[employer]. He was mostly able to sit still, was allowed to go for a walk if he asked the teacher. He was able to keep quiet \"when I need to\" when a teacher is talking. He denies distracted easily a lot, sometimes was and sometimes was more than peers. Mornings were about the same as afternoons. Denies daydreaming or making funny noises. Denies leaving the house or the classroom without permission. He denies he is sad or worried a lot. Sleeping okay, eating okay.  Denies elevated mood. Denies side effects from medication. Medicine helps with what he is not sure. Denies missing doses of medication in the past two weeks. Denies being overly negative or overly positive. Enjoys usual activities. Denies isolating a lot.  Denies substance use or abuse. Doing chores, taking care of hygiene per patient.  He denies late night cleaning. Denies major losses or traumas.      Psychiatric ROS:   Bipolar disorder: The patient denies having grandiose mood , elevated energy, being awake for extended periods of time without the need for sleep, excessive talking, impulsive behavior, hypersexual behavior, racing thoughts and mood swings.   Obsessive compulsive: The patient denies having repetitive thoughts,  repetitive visions, hoarding behavior, repetitive checking and uses of rituals behaviors.   Post-traumatic stress disorder: The patient denies having trauma that has caused nightmares, hypervigilant behavior, emotional numbing, flashbacks, avoidant behavior, anger, and dissociation.   Panic attacks/Generalized anxiety disorder: The patient denies having nervousness, constant worrying, muscle tension, anger, key up , problems with sleep caused by anxiety, shortness of breath, palpitations, chest tightness, hot/cold flashes, sweating, shakiness, and feeling of impending doom.     Past Medical and Current Status:   The following were reviewed in the electronic record as past history and active problems:  Active Ambulatory Problems     Diagnosis Date Noted   • Attention deficit hyperactivity disorder (ADHD), inattentive type, moderate 07/14/2014   • Anxiety 07/14/2014   • Seasonal allergies    • Mood disorder (CMS/HCC) 07/23/2014   • DMDD (disruptive mood dysregulation disorder) (CMS/HCC) 09/11/2018   • Generalized anxiety disorder 11/07/2012   • Acne vulgaris 02/26/2019   • BMI, pediatric > 99% for age 07/28/2019   • Elevated ALT measurement 07/28/2019   • Highly echogenic liver on ultrasound 07/28/2019     Resolved Ambulatory Problems     Diagnosis Date Noted   • No Resolved Ambulatory Problems     Past Medical History:   Diagnosis Date   • ADHD (attention deficit hyperactivity disorder)    • DMDD (disruptive mood dysregulation disorder) (CMS/[facility])    • Keratoconus of both eyes    • Osteomyelitis (CMS/[facility])    • RAD (reactive airway disease)    • Seasonal allergies      Patient Active Problem List   Diagnosis   • Attention deficit hyperactivity disorder (ADHD), inattentive type, moderate   • Anxiety   • Seasonal allergies   • Mood disorder (CMS/HCC)   • DMDD (disruptive mood dysregulation disorder) (CMS/[facility])   • Generalized anxiety disorder   • Acne vulgaris   • BMI, pediatric > 99% for age   • Elevated ALT measurement    • Highly echogenic liver on ultrasound       Medications at Assessment: Prior to assessment medications were reviewed in the electronic record.  Current Outpatient Medications   Medication Sig Dispense Refill   • ARIPiprazole (ABILIFY) 10 MG tablet Take 1 tablet by mouth 2 times daily. Indications: Major Depressive Disorder 180 tablet 2   • guanFACINE 4 MG extended-release tablet Take 1 tablet by mouth daily. 90 tablet 0   • lamotrigine (LAMICTAL) 200 MG tablet Take 1 tablet by mouth 2 times daily. Indications: Manic-Depression MUST schedule appt for refills 180 tablet 0   • lisdexamfetamine (VYVANSE) 50 MG capsule Take 1 capsule by mouth daily. 90 capsule 0   • traZODone (DESYREL) 100 MG tablet Take 1 tablet by mouth nightly. Indications: Trouble Sleeping 90 tablet 0   • lamoTRIgine (LAMICTAL) 25 MG tablet Indications: mood swing Take 1 po x  2 wks in am   Then  2   Tabs po in am 180 tablet 0   • predniSONE (DELTASONE) 10 MG tablet Take 10 mg by mouth daily as needed (cat allergies.).     • diphenhydrAMINE (BENADRYL) 25 MG capsule Take 25 mg by mouth every 6 hours as needed for Allergies.     • DISPENSE Immunotherapy Allergy Injection   Weekly injection gradually increasing in dose to develop allergic immunity.   Injection includes a mixture of ragweed, grass, tree mix 2, dog, minor mold 1 and minor mold 2.     • fluticasone (FLONASE) 50 MCG/ACT nasal spray Spray 1 spray in each nostril 2 times daily as needed (seasonal allergies.).      • fexofenadine (ALLEGRA) 60 MG tablet Take 1 tablet by mouth 2 times daily.     • fluticasone (FLOVENT HFA) 44 MCG/ACT inhaler Inhale 2 puffs into the lungs 2 times daily. 10.6 g 12   • Cholecalciferol (VITAMIN D3) 5000 units capsule Take 1 capsule by mouth daily.     • albuterol 108 (90 BASE) MCG/ACT inhaler Inhale 2 puffs into the lungs every 4 hours as needed for Shortness of Breath. 1 Inhaler      No current facility-administered medications for this encounter.        Family  History (Psych and pertinent Med):  Reviewed and updated in the electronic record.  His brother has ADHD    Social History:    Social History     Tobacco Use   • Smoking status: Never Smoker   • Smokeless tobacco: Never Used   Substance Use Topics   • Alcohol use: No   • Drug use: No     None/No Preference  Living Condition: lives with parents and 12 year old brother  Social and Sexual History reviewed with the patient and updated in the electronic record.    Mental Status Exam:  Patient appears stated age. Patient is alert and fully oriented. No abnormal involuntary movements or gait issues noted. Speech is of normal rate, tone and volume. Thought process is free of any thought blocking or perseveration. Thought content logical and coherent with no delusions. Patient’s perception did not reveal hallucinations. Attention and concentration are unremarkable. Remote and immediate memories are intact. Insight and judgement are fair. Fund of knowledge is average. Patient denies suicidal or homicidal ideations.    Inventory of Assets: math, science    Diagnosis: Disruptive mood dysregulation disorder; generalized anxiety disorder; Attention deficit hyperactivity disorder, combined type; oppositional defiant disorder; autism spectrum disorder    Medical Decision Making/Plan of Treatment: Patient with history of mood lability; anxiety, attention difficulties, defiance, and Asperger's would just completed partial hospitalization related to some increases in lability recently which had been noticed most at home. Mother says he seems to do well often for half the day and this is not consistent and the other half tends to be poor, for example if he has a good morning he will have terrible afternoon and if he has a terrible morning he will usually have a good afternoon. She does think that his stimulant Vyvanse wears off around 3 PM and we discussed adding some short acting dextroamphetamine to help as he does seem to be more  hartley when his Vyvanse has worn off and seems somewhat better on the average when it is in his system. We will continue his medicine otherwise the same.  Seen 30 minutes for first psychiatric assessment since restarting Day treatment  No orders of the defined types were placed in this encounter.      This information is confidential and disclosure without patient consent or statutory authorization is prohibited by law.    BOO Reyes   FAMILY HISTORY:  Father  Still living? No  Family history of heart attack, Age at diagnosis: Age Unknown    Sibling  Still living? No  Family history of colon cancer, Age at diagnosis: Age Unknown

## 2024-06-16 NOTE — ED ADULT NURSE NOTE - OBJECTIVE STATEMENT
Pt presents to the ED C/o fatigue and weakness. Pt states "I'm disappearing, I have had no appetite for a month, and I'm dehydrated." Pt reports being constipated, pt does not remember her last bowel movement. Pt endorses headache and chest pressure from anxiety. Pt has a hx of a-fib and is on aspirin 81mg.

## 2024-06-16 NOTE — ED PROVIDER NOTE - GASTROINTESTINAL, MLM
Patient's caregiver, Tiffanie Choudhury, called to let Dr. Tierra Joshi know that Patient tested positive for covid today. Starting last night, patient has had a slight fever. No other symptoms to report at this time. He can be reached at [phone].   Please advise, thank you Abdomen soft, non-tender, no guarding.

## 2024-06-16 NOTE — ED PROVIDER NOTE - PROGRESS NOTE DETAILS
ED Attending Dr. Price, Dr. Ana Lilia musa and pt can d.c and will add on remeron.  Pt can cont xanax and ambien and follow up with pmd. ED Attending Dr. Price, pt updated on results.  Pt wants to go home.  Plan d.c

## 2024-06-16 NOTE — ED PROVIDER NOTE - OBJECTIVE STATEMENT
74 y/o F here with . States she is here because she is "wasting away" and is very weak. States these sx have been going on for a while. States she can't take it anymore. Called PMD and told to go to hospital. Denies nausea, vomiting, chest pain, SOB. No diarrhea, though she endorses constipation.

## 2024-06-16 NOTE — ED PROVIDER NOTE - RESPIRATORY, MLM
44 y/o M presents to ED c/o dull frontal headache that is described as a tightness and has been constant for "I don't know how long but a long time." Also reports at least 6 months of back pain all over. Denies trauma, fall, numbness, weakness, or bowel/bladder dysfunction. Pt reports he may have seen someone in the past for this headache but is unsure if it was in an ED or an office, unsure what kind of doctor he saw, and unsure if he had any head imaging. Pt has been taking ibuprofen and tylenol without successful relief.
Breath sounds clear and equal bilaterally.

## 2024-06-16 NOTE — ED ADULT NURSE NOTE - NSFALLHARMRISKINTERV_ED_ALL_ED

## 2024-06-16 NOTE — ED PROVIDER NOTE - CLINICAL SUMMARY MEDICAL DECISION MAKING FREE TEXT BOX
74 y/o pt presents with weakness, is also anxious and states she, "can't take it anymore." Plan to check labs, CT scan, psych consult.

## 2024-06-16 NOTE — ED BEHAVIORAL HEALTH ASSESSMENT NOTE - HPI (INCLUDE ILLNESS QUALITY, SEVERITY, DURATION, TIMING, CONTEXT, MODIFYING FACTORS, ASSOCIATED SIGNS AND SYMPTOMS)
Patient a 74 y/o  female, domiciled, no children, no past psychiatric hx, more recently anxious/depressed as her  has ileostomy most recently performed in May' 2024 and is more anxious/nervous as he has to drink to avoid dehydration, and also take other fluids as needed for stability/safety, no prior SI/SA, no drug abuse hx., medically has A-fib on Xarelto, HTN; HLD with poor vision was BIB /self due to increased anxiety/depression.    Patient in bed, under care of PCP Dr. Joe Corbin who has been prescribing her Lunesta to help with sleep and Xanax to help deal with anxiety. She endorses that she has SOB with associated anxiety,  chest tightness etc. and is getting more anxious due to her 's deteriorating condition with an ileostomy bag. She has poor sleep, extremely anxious ., por eating habits and losing weight with associated tiredness, fatigue and helplessness and with no motivation and the ball kept on rolling to a point that she decided to come to the hospital to get assistance. Her  at bed side does not seem to be in anxiety, but endorses that she is always anxious all her life. she agreed to take Remeron 7.5 mg HS and was explained that she need to give at least 3-4 weeks to help deal with meds so it can work  well and to continue with PCP for Xanax/Lunesta for sleep and anxiety. No prior or current S/H/I/P.  She is able to care for herself, able to ambulate without assistance, and feels that she is getting weaker

## 2024-06-16 NOTE — ED BEHAVIORAL HEALTH ASSESSMENT NOTE - SUMMARY
Patient in bed, under care of PCP Dr. Joe Corbin who has been prescribing her Lunesta to help with sleep and Xanax to help deal with anxiety. She endorses that she has SOB with associated anxiety,  chest tightness etc. and is getting more anxious due to her 's deteriorating condition with an ileostomy bag. She has poor sleep, extremely anxious ., por eating habits and losing weight with associated tiredness, fatigue and helplessness and with no motivation and the ball kept on rolling to a point that she decided to come to the hospital to get assistance. Her  at bed side does not seem to be in anxiety, but endorses that she is always anxious all her life. she agreed to take Remeron 7.5 mg HS and was explained that she need to give at least 3-4 weeks to help deal with meds so it can work  well and to continue with PCP for Xanax/Lunesta for sleep and anxiety. No prior or current S/H/I/P.  She is able to care for herself, able to ambulate without assistance, and feels that she is getting weaker.    Plan: Remeron 7.5 mg HS-30 days          F/U with

## 2024-06-16 NOTE — ED PROVIDER NOTE - PATIENT PORTAL LINK FT
You can access the FollowMyHealth Patient Portal offered by [facility] by registering at the following website: [email or web address]. By joining [employer]’s FollowMyHealth portal, you will also be able to view your health information using other applications (apps) compatible with our system.

## 2024-06-16 NOTE — ED ADULT TRIAGE NOTE - CHIEF COMPLAINT QUOTE
pt presents to ED from home for decreased PO intake, generalized body aches. pt states that she has a lot of anxiety due to stress with her husbands health and doesn't want to eat. called ambulance because she feels like "I am disappearing". pt is A&O x3. tearful in triage. placed in view of nursing station.

## 2024-06-16 NOTE — ED BEHAVIORAL HEALTH NOTE - BEHAVIORAL HEALTH NOTE
Writer and Attending met with pt and  in the ER. Pt did not require inpt level of care at this time. Pt started on Remeron and will be returning to PCP Dr Jeo Corbin in [place] for follow up care. Pt denies S/H/I, denies A/V/H. Pt accompanied by  and will be returning home.

## 2024-06-16 NOTE — ED PROVIDER NOTE - PSYCHIATRIC, MLM
Alert and oriented to person, place, time/situation. normal mood and affect. no apparent risk to self or others. anxious, denies SI/HI Alert and oriented to person, place, time/situation, anxious

## 2024-07-03 ENCOUNTER — EMERGENCY (EMERGENCY)
Facility: HOSPITAL | Age: 74
LOS: 1 days | Discharge: HOME | End: 2024-07-03
Attending: EMERGENCY MEDICINE | Admitting: EMERGENCY MEDICINE
Payer: MEDICARE

## 2024-07-03 VITALS
HEIGHT: 62 IN | OXYGEN SATURATION: 100 % | TEMPERATURE: 98 F | HEART RATE: 103 BPM | WEIGHT: 115.08 LBS | DIASTOLIC BLOOD PRESSURE: 82 MMHG | RESPIRATION RATE: 20 BRPM | SYSTOLIC BLOOD PRESSURE: 134 MMHG

## 2024-07-03 VITALS
HEART RATE: 85 BPM | RESPIRATION RATE: 17 BRPM | OXYGEN SATURATION: 98 % | TEMPERATURE: 98 F | SYSTOLIC BLOOD PRESSURE: 142 MMHG | DIASTOLIC BLOOD PRESSURE: 80 MMHG

## 2024-07-03 DIAGNOSIS — Z86.69 PERSONAL HISTORY OF OTHER DISEASES OF THE NERVOUS SYSTEM AND SENSE ORGANS: Chronic | ICD-10-CM

## 2024-07-03 DIAGNOSIS — Z98.890 OTHER SPECIFIED POSTPROCEDURAL STATES: Chronic | ICD-10-CM

## 2024-07-03 DIAGNOSIS — Z90.710 ACQUIRED ABSENCE OF BOTH CERVIX AND UTERUS: Chronic | ICD-10-CM

## 2024-07-03 DIAGNOSIS — Z96.641 PRESENCE OF RIGHT ARTIFICIAL HIP JOINT: Chronic | ICD-10-CM

## 2024-07-03 LAB
ALBUMIN SERPL ELPH-MCNC: 3.7 G/DL — SIGNIFICANT CHANGE UP (ref 3.3–5)
ALP SERPL-CCNC: 75 U/L — SIGNIFICANT CHANGE UP (ref 30–120)
ALT FLD-CCNC: 21 U/L — SIGNIFICANT CHANGE UP (ref 10–60)
ANION GAP SERPL CALC-SCNC: 6 MMOL/L — SIGNIFICANT CHANGE UP (ref 5–17)
APAP SERPL-MCNC: <1 UG/ML — LOW (ref 10–30)
APPEARANCE UR: CLEAR — SIGNIFICANT CHANGE UP
AST SERPL-CCNC: 18 U/L — SIGNIFICANT CHANGE UP (ref 10–40)
BASOPHILS # BLD AUTO: 0.06 K/UL — SIGNIFICANT CHANGE UP (ref 0–0.2)
BASOPHILS NFR BLD AUTO: 0.7 % — SIGNIFICANT CHANGE UP (ref 0–2)
BILIRUB SERPL-MCNC: 0.3 MG/DL — SIGNIFICANT CHANGE UP (ref 0.2–1.2)
BILIRUB UR-MCNC: NEGATIVE — SIGNIFICANT CHANGE UP
BUN SERPL-MCNC: 12 MG/DL — SIGNIFICANT CHANGE UP (ref 7–23)
CALCIUM SERPL-MCNC: 9.5 MG/DL — SIGNIFICANT CHANGE UP (ref 8.4–10.5)
CHLORIDE SERPL-SCNC: 101 MMOL/L — SIGNIFICANT CHANGE UP (ref 96–108)
CO2 SERPL-SCNC: 30 MMOL/L — SIGNIFICANT CHANGE UP (ref 22–31)
COLOR SPEC: YELLOW — SIGNIFICANT CHANGE UP
CREAT SERPL-MCNC: 0.7 MG/DL — SIGNIFICANT CHANGE UP (ref 0.5–1.3)
DIFF PNL FLD: NEGATIVE — SIGNIFICANT CHANGE UP
EGFR: 90 ML/MIN/1.73M2 — SIGNIFICANT CHANGE UP
EOSINOPHIL # BLD AUTO: 0.18 K/UL — SIGNIFICANT CHANGE UP (ref 0–0.5)
EOSINOPHIL NFR BLD AUTO: 2 % — SIGNIFICANT CHANGE UP (ref 0–6)
ETHANOL SERPL-MCNC: <3 MG/DL — SIGNIFICANT CHANGE UP (ref 0–3)
GLUCOSE SERPL-MCNC: 128 MG/DL — HIGH (ref 70–99)
GLUCOSE UR QL: NEGATIVE MG/DL — SIGNIFICANT CHANGE UP
HCT VFR BLD CALC: 39.3 % — SIGNIFICANT CHANGE UP (ref 34.5–45)
HGB BLD-MCNC: 13 G/DL — SIGNIFICANT CHANGE UP (ref 11.5–15.5)
IMM GRANULOCYTES NFR BLD AUTO: 0.2 % — SIGNIFICANT CHANGE UP (ref 0–0.9)
KETONES UR-MCNC: NEGATIVE MG/DL — SIGNIFICANT CHANGE UP
LEUKOCYTE ESTERASE UR-ACNC: ABNORMAL
LIDOCAIN IGE QN: 50 U/L — SIGNIFICANT CHANGE UP (ref 16–77)
LYMPHOCYTES # BLD AUTO: 1.86 K/UL — SIGNIFICANT CHANGE UP (ref 1–3.3)
LYMPHOCYTES # BLD AUTO: 20.8 % — SIGNIFICANT CHANGE UP (ref 13–44)
MAGNESIUM SERPL-MCNC: 1.9 MG/DL — SIGNIFICANT CHANGE UP (ref 1.6–2.6)
MCHC RBC-ENTMCNC: 29.7 PG — SIGNIFICANT CHANGE UP (ref 27–34)
MCHC RBC-ENTMCNC: 33.1 GM/DL — SIGNIFICANT CHANGE UP (ref 32–36)
MCV RBC AUTO: 89.7 FL — SIGNIFICANT CHANGE UP (ref 80–100)
MONOCYTES # BLD AUTO: 0.72 K/UL — SIGNIFICANT CHANGE UP (ref 0–0.9)
MONOCYTES NFR BLD AUTO: 8 % — SIGNIFICANT CHANGE UP (ref 2–14)
NEUTROPHILS # BLD AUTO: 6.11 K/UL — SIGNIFICANT CHANGE UP (ref 1.8–7.4)
NEUTROPHILS NFR BLD AUTO: 68.3 % — SIGNIFICANT CHANGE UP (ref 43–77)
NITRITE UR-MCNC: NEGATIVE — SIGNIFICANT CHANGE UP
NRBC # BLD: 0 /100 WBCS — SIGNIFICANT CHANGE UP (ref 0–0)
PCP SPEC-MCNC: SIGNIFICANT CHANGE UP
PH UR: 6.5 — SIGNIFICANT CHANGE UP (ref 5–8)
PLATELET # BLD AUTO: 346 K/UL — SIGNIFICANT CHANGE UP (ref 150–400)
POTASSIUM SERPL-MCNC: 4 MMOL/L — SIGNIFICANT CHANGE UP (ref 3.5–5.3)
POTASSIUM SERPL-SCNC: 4 MMOL/L — SIGNIFICANT CHANGE UP (ref 3.5–5.3)
PROT SERPL-MCNC: 6.8 G/DL — SIGNIFICANT CHANGE UP (ref 6–8.3)
PROT UR-MCNC: NEGATIVE MG/DL — SIGNIFICANT CHANGE UP
RBC # BLD: 4.38 M/UL — SIGNIFICANT CHANGE UP (ref 3.8–5.2)
RBC # FLD: 12.4 % — SIGNIFICANT CHANGE UP (ref 10.3–14.5)
SALICYLATES SERPL-MCNC: 0.8 MG/DL — LOW (ref 2.8–20)
SODIUM SERPL-SCNC: 137 MMOL/L — SIGNIFICANT CHANGE UP (ref 135–145)
SP GR SPEC: 1.01 — SIGNIFICANT CHANGE UP (ref 1–1.03)
TROPONIN I, HIGH SENSITIVITY RESULT: 5 NG/L — SIGNIFICANT CHANGE UP
UROBILINOGEN FLD QL: 1 MG/DL — SIGNIFICANT CHANGE UP (ref 0.2–1)
WBC # BLD: 8.95 K/UL — SIGNIFICANT CHANGE UP (ref 3.8–10.5)
WBC # FLD AUTO: 8.95 K/UL — SIGNIFICANT CHANGE UP (ref 3.8–10.5)

## 2024-07-03 PROCEDURE — 81001 URINALYSIS AUTO W/SCOPE: CPT

## 2024-07-03 PROCEDURE — 36415 COLL VENOUS BLD VENIPUNCTURE: CPT

## 2024-07-03 PROCEDURE — 99285 EMERGENCY DEPT VISIT HI MDM: CPT

## 2024-07-03 PROCEDURE — 84484 ASSAY OF TROPONIN QUANT: CPT

## 2024-07-03 PROCEDURE — 99284 EMERGENCY DEPT VISIT MOD MDM: CPT

## 2024-07-03 PROCEDURE — 90792 PSYCH DIAG EVAL W/MED SRVCS: CPT | Mod: 2W

## 2024-07-03 PROCEDURE — 85025 COMPLETE CBC W/AUTO DIFF WBC: CPT

## 2024-07-03 PROCEDURE — 80053 COMPREHEN METABOLIC PANEL: CPT

## 2024-07-03 PROCEDURE — 83690 ASSAY OF LIPASE: CPT

## 2024-07-03 PROCEDURE — 83735 ASSAY OF MAGNESIUM: CPT

## 2024-07-03 PROCEDURE — 80307 DRUG TEST PRSMV CHEM ANLYZR: CPT

## 2024-07-03 PROCEDURE — 87086 URINE CULTURE/COLONY COUNT: CPT

## 2024-07-03 RX ORDER — SODIUM CHLORIDE 0.9 % (FLUSH) 0.9 %
1000 SYRINGE (ML) INJECTION ONCE
Refills: 0 | Status: COMPLETED | OUTPATIENT
Start: 2024-07-03 | End: 2024-07-03

## 2024-07-03 RX ORDER — ALPRAZOLAM 2 MG/1
0.25 TABLET ORAL ONCE
Refills: 0 | Status: DISCONTINUED | OUTPATIENT
Start: 2024-07-03 | End: 2024-07-03

## 2024-07-03 RX ADMIN — ALPRAZOLAM 0.25 MILLIGRAM(S): 2 TABLET ORAL at 14:11

## 2024-07-03 RX ADMIN — Medication 1000 MILLILITER(S): at 12:13

## 2024-07-12 ENCOUNTER — EMERGENCY (EMERGENCY)
Facility: HOSPITAL | Age: 74
LOS: 0 days | Discharge: HOME | End: 2024-07-12
Attending: FAMILY MEDICINE
Payer: MEDICARE

## 2024-07-12 VITALS
HEART RATE: 97 BPM | WEIGHT: 110.01 LBS | HEIGHT: 62 IN | DIASTOLIC BLOOD PRESSURE: 105 MMHG | RESPIRATION RATE: 18 BRPM | OXYGEN SATURATION: 97 % | SYSTOLIC BLOOD PRESSURE: 150 MMHG | TEMPERATURE: 98 F

## 2024-07-12 VITALS
HEART RATE: 97 BPM | TEMPERATURE: 98 F | DIASTOLIC BLOOD PRESSURE: 74 MMHG | SYSTOLIC BLOOD PRESSURE: 142 MMHG | RESPIRATION RATE: 17 BRPM | OXYGEN SATURATION: 100 %

## 2024-07-12 DIAGNOSIS — R25.2 CRAMP AND SPASM: ICD-10-CM

## 2024-07-12 DIAGNOSIS — F32.A DEPRESSION, UNSPECIFIED: ICD-10-CM

## 2024-07-12 DIAGNOSIS — Z98.890 OTHER SPECIFIED POSTPROCEDURAL STATES: Chronic | ICD-10-CM

## 2024-07-12 DIAGNOSIS — R07.9 CHEST PAIN, UNSPECIFIED: ICD-10-CM

## 2024-07-12 DIAGNOSIS — Z90.710 ACQUIRED ABSENCE OF BOTH CERVIX AND UTERUS: Chronic | ICD-10-CM

## 2024-07-12 DIAGNOSIS — F41.9 ANXIETY DISORDER, UNSPECIFIED: ICD-10-CM

## 2024-07-12 DIAGNOSIS — Z96.641 PRESENCE OF RIGHT ARTIFICIAL HIP JOINT: Chronic | ICD-10-CM

## 2024-07-12 DIAGNOSIS — Z88.2 ALLERGY STATUS TO SULFONAMIDES: ICD-10-CM

## 2024-07-12 DIAGNOSIS — Z86.69 PERSONAL HISTORY OF OTHER DISEASES OF THE NERVOUS SYSTEM AND SENSE ORGANS: Chronic | ICD-10-CM

## 2024-07-12 DIAGNOSIS — I48.91 UNSPECIFIED ATRIAL FIBRILLATION: ICD-10-CM

## 2024-07-12 LAB
ALBUMIN SERPL ELPH-MCNC: 3.9 G/DL — SIGNIFICANT CHANGE UP (ref 3.3–5)
ALP SERPL-CCNC: 68 U/L — SIGNIFICANT CHANGE UP (ref 40–120)
ALT FLD-CCNC: 21 U/L — SIGNIFICANT CHANGE UP (ref 12–78)
ANION GAP SERPL CALC-SCNC: 6 MMOL/L — SIGNIFICANT CHANGE UP (ref 5–17)
APPEARANCE UR: CLEAR — SIGNIFICANT CHANGE UP
APTT BLD: 28 SEC — SIGNIFICANT CHANGE UP (ref 24.5–35.6)
AST SERPL-CCNC: 15 U/L — SIGNIFICANT CHANGE UP (ref 15–37)
BACTERIA # UR AUTO: NEGATIVE /HPF — SIGNIFICANT CHANGE UP
BASOPHILS # BLD AUTO: 0.05 K/UL — SIGNIFICANT CHANGE UP (ref 0–0.2)
BASOPHILS NFR BLD AUTO: 0.5 % — SIGNIFICANT CHANGE UP (ref 0–2)
BILIRUB SERPL-MCNC: 0.4 MG/DL — SIGNIFICANT CHANGE UP (ref 0.2–1.2)
BILIRUB UR-MCNC: NEGATIVE — SIGNIFICANT CHANGE UP
BLD GP AB SCN SERPL QL: SIGNIFICANT CHANGE UP
BUN SERPL-MCNC: 6 MG/DL — LOW (ref 7–23)
CALCIUM SERPL-MCNC: 9.3 MG/DL — SIGNIFICANT CHANGE UP (ref 8.5–10.1)
CAST: 0 /LPF — SIGNIFICANT CHANGE UP (ref 0–4)
CHLORIDE SERPL-SCNC: 100 MMOL/L — SIGNIFICANT CHANGE UP (ref 96–108)
CK SERPL-CCNC: 46 U/L — SIGNIFICANT CHANGE UP (ref 26–192)
CO2 SERPL-SCNC: 26 MMOL/L — SIGNIFICANT CHANGE UP (ref 22–31)
COLOR SPEC: YELLOW — SIGNIFICANT CHANGE UP
CREAT SERPL-MCNC: 0.52 MG/DL — SIGNIFICANT CHANGE UP (ref 0.5–1.3)
DIFF PNL FLD: ABNORMAL
EGFR: 97 ML/MIN/1.73M2 — SIGNIFICANT CHANGE UP
EOSINOPHIL # BLD AUTO: 0.11 K/UL — SIGNIFICANT CHANGE UP (ref 0–0.5)
EOSINOPHIL NFR BLD AUTO: 1.1 % — SIGNIFICANT CHANGE UP (ref 0–6)
GLUCOSE SERPL-MCNC: 108 MG/DL — HIGH (ref 70–99)
GLUCOSE UR QL: NEGATIVE MG/DL — SIGNIFICANT CHANGE UP
HCT VFR BLD CALC: 36.4 % — SIGNIFICANT CHANGE UP (ref 34.5–45)
HGB BLD-MCNC: 12.7 G/DL — SIGNIFICANT CHANGE UP (ref 11.5–15.5)
IMM GRANULOCYTES NFR BLD AUTO: 0.3 % — SIGNIFICANT CHANGE UP (ref 0–0.9)
INR BLD: 0.96 RATIO — SIGNIFICANT CHANGE UP (ref 0.85–1.18)
KETONES UR-MCNC: 15 MG/DL
LEUKOCYTE ESTERASE UR-ACNC: ABNORMAL
LYMPHOCYTES # BLD AUTO: 2.3 K/UL — SIGNIFICANT CHANGE UP (ref 1–3.3)
LYMPHOCYTES # BLD AUTO: 23.9 % — SIGNIFICANT CHANGE UP (ref 13–44)
MCHC RBC-ENTMCNC: 30.5 PG — SIGNIFICANT CHANGE UP (ref 27–34)
MCHC RBC-ENTMCNC: 34.9 GM/DL — SIGNIFICANT CHANGE UP (ref 32–36)
MCV RBC AUTO: 87.3 FL — SIGNIFICANT CHANGE UP (ref 80–100)
MONOCYTES # BLD AUTO: 0.71 K/UL — SIGNIFICANT CHANGE UP (ref 0–0.9)
MONOCYTES NFR BLD AUTO: 7.4 % — SIGNIFICANT CHANGE UP (ref 2–14)
NEUTROPHILS # BLD AUTO: 6.41 K/UL — SIGNIFICANT CHANGE UP (ref 1.8–7.4)
NEUTROPHILS NFR BLD AUTO: 66.8 % — SIGNIFICANT CHANGE UP (ref 43–77)
NITRITE UR-MCNC: NEGATIVE — SIGNIFICANT CHANGE UP
PH UR: 7 — SIGNIFICANT CHANGE UP (ref 5–8)
PLATELET # BLD AUTO: 354 K/UL — SIGNIFICANT CHANGE UP (ref 150–400)
POTASSIUM SERPL-MCNC: 3.2 MMOL/L — LOW (ref 3.5–5.3)
POTASSIUM SERPL-SCNC: 3.2 MMOL/L — LOW (ref 3.5–5.3)
PROT SERPL-MCNC: 6.6 GM/DL — SIGNIFICANT CHANGE UP (ref 6–8.3)
PROT UR-MCNC: NEGATIVE MG/DL — SIGNIFICANT CHANGE UP
PROTHROM AB SERPL-ACNC: 10.9 SEC — SIGNIFICANT CHANGE UP (ref 9.5–13)
RBC # BLD: 4.17 M/UL — SIGNIFICANT CHANGE UP (ref 3.8–5.2)
RBC # FLD: 12.2 % — SIGNIFICANT CHANGE UP (ref 10.3–14.5)
RBC CASTS # UR COMP ASSIST: 40 /HPF — HIGH (ref 0–4)
SODIUM SERPL-SCNC: 132 MMOL/L — LOW (ref 135–145)
SP GR SPEC: 1.01 — SIGNIFICANT CHANGE UP (ref 1–1.03)
SQUAMOUS # UR AUTO: 0 /HPF — SIGNIFICANT CHANGE UP (ref 0–5)
TROPONIN I, HIGH SENSITIVITY RESULT: 6.18 NG/L — SIGNIFICANT CHANGE UP
TSH SERPL-MCNC: 1.74 UU/ML — SIGNIFICANT CHANGE UP (ref 0.34–4.82)
UROBILINOGEN FLD QL: 0.2 MG/DL — SIGNIFICANT CHANGE UP (ref 0.2–1)
WBC # BLD: 9.61 K/UL — SIGNIFICANT CHANGE UP (ref 3.8–10.5)
WBC # FLD AUTO: 9.61 K/UL — SIGNIFICANT CHANGE UP (ref 3.8–10.5)
WBC UR QL: 1 /HPF — SIGNIFICANT CHANGE UP (ref 0–5)

## 2024-07-12 PROCEDURE — 36415 COLL VENOUS BLD VENIPUNCTURE: CPT

## 2024-07-12 PROCEDURE — 84443 ASSAY THYROID STIM HORMONE: CPT

## 2024-07-12 PROCEDURE — 99285 EMERGENCY DEPT VISIT HI MDM: CPT

## 2024-07-12 PROCEDURE — 85730 THROMBOPLASTIN TIME PARTIAL: CPT

## 2024-07-12 PROCEDURE — 84484 ASSAY OF TROPONIN QUANT: CPT

## 2024-07-12 PROCEDURE — 85025 COMPLETE CBC W/AUTO DIFF WBC: CPT

## 2024-07-12 PROCEDURE — 99284 EMERGENCY DEPT VISIT MOD MDM: CPT | Mod: 25

## 2024-07-12 PROCEDURE — 85610 PROTHROMBIN TIME: CPT

## 2024-07-12 PROCEDURE — 86900 BLOOD TYPING SEROLOGIC ABO: CPT

## 2024-07-12 PROCEDURE — 86850 RBC ANTIBODY SCREEN: CPT

## 2024-07-12 PROCEDURE — 82550 ASSAY OF CK (CPK): CPT

## 2024-07-12 PROCEDURE — 93005 ELECTROCARDIOGRAM TRACING: CPT

## 2024-07-12 PROCEDURE — 81001 URINALYSIS AUTO W/SCOPE: CPT

## 2024-07-12 PROCEDURE — 96374 THER/PROPH/DIAG INJ IV PUSH: CPT

## 2024-07-12 PROCEDURE — 80053 COMPREHEN METABOLIC PANEL: CPT

## 2024-07-12 PROCEDURE — 93010 ELECTROCARDIOGRAM REPORT: CPT

## 2024-07-12 PROCEDURE — 86140 C-REACTIVE PROTEIN: CPT

## 2024-07-12 PROCEDURE — 86901 BLOOD TYPING SEROLOGIC RH(D): CPT

## 2024-07-12 RX ORDER — SODIUM CHLORIDE 0.9 % (FLUSH) 0.9 %
1000 SYRINGE (ML) INJECTION ONCE
Refills: 0 | Status: COMPLETED | OUTPATIENT
Start: 2024-07-12 | End: 2024-07-12

## 2024-07-12 RX ORDER — LORAZEPAM 0.5 MG
0.5 TABLET ORAL ONCE
Refills: 0 | Status: DISCONTINUED | OUTPATIENT
Start: 2024-07-12 | End: 2024-07-12

## 2024-07-12 RX ORDER — POTASSIUM CHLORIDE 600 MG/1
40 TABLET, FILM COATED, EXTENDED RELEASE ORAL ONCE
Refills: 0 | Status: COMPLETED | OUTPATIENT
Start: 2024-07-12 | End: 2024-07-12

## 2024-07-12 RX ADMIN — Medication 1000 MILLILITER(S): at 20:58

## 2024-07-12 RX ADMIN — Medication 0.5 MILLIGRAM(S): at 20:57

## 2024-07-12 RX ADMIN — POTASSIUM CHLORIDE 40 MILLIEQUIVALENT(S): 600 TABLET, FILM COATED, EXTENDED RELEASE ORAL at 22:05

## 2024-07-13 LAB — CRP SERPL-MCNC: <3 MG/L — SIGNIFICANT CHANGE UP

## 2024-07-21 ENCOUNTER — EMERGENCY (EMERGENCY)
Facility: HOSPITAL | Age: 74
LOS: 0 days | Discharge: HOME | End: 2024-07-21
Attending: STUDENT IN AN ORGANIZED HEALTH CARE EDUCATION/TRAINING PROGRAM
Payer: MEDICARE

## 2024-07-21 VITALS
TEMPERATURE: 98 F | RESPIRATION RATE: 24 BRPM | OXYGEN SATURATION: 96 % | HEART RATE: 111 BPM | DIASTOLIC BLOOD PRESSURE: 100 MMHG | HEIGHT: 62 IN | SYSTOLIC BLOOD PRESSURE: 155 MMHG | WEIGHT: 111.99 LBS

## 2024-07-21 VITALS
DIASTOLIC BLOOD PRESSURE: 72 MMHG | RESPIRATION RATE: 18 BRPM | HEART RATE: 89 BPM | SYSTOLIC BLOOD PRESSURE: 113 MMHG | TEMPERATURE: 98 F | OXYGEN SATURATION: 100 %

## 2024-07-21 DIAGNOSIS — Z98.890 OTHER SPECIFIED POSTPROCEDURAL STATES: Chronic | ICD-10-CM

## 2024-07-21 DIAGNOSIS — R10.9 UNSPECIFIED ABDOMINAL PAIN: ICD-10-CM

## 2024-07-21 DIAGNOSIS — K59.00 CONSTIPATION, UNSPECIFIED: ICD-10-CM

## 2024-07-21 DIAGNOSIS — R05.9 COUGH, UNSPECIFIED: ICD-10-CM

## 2024-07-21 DIAGNOSIS — I48.91 UNSPECIFIED ATRIAL FIBRILLATION: ICD-10-CM

## 2024-07-21 DIAGNOSIS — I10 ESSENTIAL (PRIMARY) HYPERTENSION: ICD-10-CM

## 2024-07-21 DIAGNOSIS — Z86.69 PERSONAL HISTORY OF OTHER DISEASES OF THE NERVOUS SYSTEM AND SENSE ORGANS: Chronic | ICD-10-CM

## 2024-07-21 DIAGNOSIS — E78.5 HYPERLIPIDEMIA, UNSPECIFIED: ICD-10-CM

## 2024-07-21 DIAGNOSIS — Z96.641 PRESENCE OF RIGHT ARTIFICIAL HIP JOINT: Chronic | ICD-10-CM

## 2024-07-21 DIAGNOSIS — Z88.2 ALLERGY STATUS TO SULFONAMIDES: ICD-10-CM

## 2024-07-21 LAB
ALBUMIN SERPL ELPH-MCNC: 3.7 G/DL — SIGNIFICANT CHANGE UP (ref 3.3–5)
ALP SERPL-CCNC: 67 U/L — SIGNIFICANT CHANGE UP (ref 40–120)
ALT FLD-CCNC: 18 U/L — SIGNIFICANT CHANGE UP (ref 12–78)
ANION GAP SERPL CALC-SCNC: 7 MMOL/L — SIGNIFICANT CHANGE UP (ref 5–17)
APPEARANCE UR: CLEAR — SIGNIFICANT CHANGE UP
AST SERPL-CCNC: 17 U/L — SIGNIFICANT CHANGE UP (ref 15–37)
BASOPHILS # BLD AUTO: 0.04 K/UL — SIGNIFICANT CHANGE UP (ref 0–0.2)
BASOPHILS NFR BLD AUTO: 0.5 % — SIGNIFICANT CHANGE UP (ref 0–2)
BILIRUB SERPL-MCNC: 0.4 MG/DL — SIGNIFICANT CHANGE UP (ref 0.2–1.2)
BILIRUB UR-MCNC: NEGATIVE — SIGNIFICANT CHANGE UP
BUN SERPL-MCNC: 8 MG/DL — SIGNIFICANT CHANGE UP (ref 7–23)
CALCIUM SERPL-MCNC: 9.4 MG/DL — SIGNIFICANT CHANGE UP (ref 8.5–10.1)
CHLORIDE SERPL-SCNC: 104 MMOL/L — SIGNIFICANT CHANGE UP (ref 96–108)
CO2 SERPL-SCNC: 24 MMOL/L — SIGNIFICANT CHANGE UP (ref 22–31)
COLOR SPEC: YELLOW — SIGNIFICANT CHANGE UP
CREAT SERPL-MCNC: 0.67 MG/DL — SIGNIFICANT CHANGE UP (ref 0.5–1.3)
DIFF PNL FLD: NEGATIVE — SIGNIFICANT CHANGE UP
EGFR: 91 ML/MIN/1.73M2 — SIGNIFICANT CHANGE UP
EOSINOPHIL # BLD AUTO: 0.09 K/UL — SIGNIFICANT CHANGE UP (ref 0–0.5)
EOSINOPHIL NFR BLD AUTO: 1.2 % — SIGNIFICANT CHANGE UP (ref 0–6)
GLUCOSE SERPL-MCNC: 98 MG/DL — SIGNIFICANT CHANGE UP (ref 70–99)
GLUCOSE UR QL: NEGATIVE MG/DL — SIGNIFICANT CHANGE UP
HCT VFR BLD CALC: 35.8 % — SIGNIFICANT CHANGE UP (ref 34.5–45)
HGB BLD-MCNC: 12.2 G/DL — SIGNIFICANT CHANGE UP (ref 11.5–15.5)
IMM GRANULOCYTES NFR BLD AUTO: 0.3 % — SIGNIFICANT CHANGE UP (ref 0–0.9)
KETONES UR-MCNC: 15 MG/DL
LEUKOCYTE ESTERASE UR-ACNC: NEGATIVE — SIGNIFICANT CHANGE UP
LIDOCAIN IGE QN: 34 U/L — SIGNIFICANT CHANGE UP (ref 13–75)
LYMPHOCYTES # BLD AUTO: 2.19 K/UL — SIGNIFICANT CHANGE UP (ref 1–3.3)
LYMPHOCYTES # BLD AUTO: 29.5 % — SIGNIFICANT CHANGE UP (ref 13–44)
MCHC RBC-ENTMCNC: 30.4 PG — SIGNIFICANT CHANGE UP (ref 27–34)
MCHC RBC-ENTMCNC: 34.1 GM/DL — SIGNIFICANT CHANGE UP (ref 32–36)
MCV RBC AUTO: 89.3 FL — SIGNIFICANT CHANGE UP (ref 80–100)
MONOCYTES # BLD AUTO: 0.6 K/UL — SIGNIFICANT CHANGE UP (ref 0–0.9)
MONOCYTES NFR BLD AUTO: 8.1 % — SIGNIFICANT CHANGE UP (ref 2–14)
NEUTROPHILS # BLD AUTO: 4.49 K/UL — SIGNIFICANT CHANGE UP (ref 1.8–7.4)
NEUTROPHILS NFR BLD AUTO: 60.4 % — SIGNIFICANT CHANGE UP (ref 43–77)
NITRITE UR-MCNC: NEGATIVE — SIGNIFICANT CHANGE UP
PH UR: 7 — SIGNIFICANT CHANGE UP (ref 5–8)
PLATELET # BLD AUTO: 381 K/UL — SIGNIFICANT CHANGE UP (ref 150–400)
POTASSIUM SERPL-MCNC: 3.5 MMOL/L — SIGNIFICANT CHANGE UP (ref 3.5–5.3)
POTASSIUM SERPL-SCNC: 3.5 MMOL/L — SIGNIFICANT CHANGE UP (ref 3.5–5.3)
PROT SERPL-MCNC: 6.6 GM/DL — SIGNIFICANT CHANGE UP (ref 6–8.3)
PROT UR-MCNC: NEGATIVE MG/DL — SIGNIFICANT CHANGE UP
RBC # BLD: 4.01 M/UL — SIGNIFICANT CHANGE UP (ref 3.8–5.2)
RBC # FLD: 12.4 % — SIGNIFICANT CHANGE UP (ref 10.3–14.5)
SODIUM SERPL-SCNC: 135 MMOL/L — SIGNIFICANT CHANGE UP (ref 135–145)
SP GR SPEC: 1.01 — SIGNIFICANT CHANGE UP (ref 1–1.03)
TROPONIN I, HIGH SENSITIVITY RESULT: 4.56 NG/L — SIGNIFICANT CHANGE UP
UROBILINOGEN FLD QL: 1 MG/DL — SIGNIFICANT CHANGE UP (ref 0.2–1)
WBC # BLD: 7.43 K/UL — SIGNIFICANT CHANGE UP (ref 3.8–10.5)
WBC # FLD AUTO: 7.43 K/UL — SIGNIFICANT CHANGE UP (ref 3.8–10.5)

## 2024-07-21 PROCEDURE — 96374 THER/PROPH/DIAG INJ IV PUSH: CPT | Mod: XU

## 2024-07-21 PROCEDURE — 71045 X-RAY EXAM CHEST 1 VIEW: CPT

## 2024-07-21 PROCEDURE — 83690 ASSAY OF LIPASE: CPT

## 2024-07-21 PROCEDURE — 99285 EMERGENCY DEPT VISIT HI MDM: CPT | Mod: FS

## 2024-07-21 PROCEDURE — 99285 EMERGENCY DEPT VISIT HI MDM: CPT | Mod: 25

## 2024-07-21 PROCEDURE — 74177 CT ABD & PELVIS W/CONTRAST: CPT | Mod: MC

## 2024-07-21 PROCEDURE — 81003 URINALYSIS AUTO W/O SCOPE: CPT

## 2024-07-21 PROCEDURE — 84484 ASSAY OF TROPONIN QUANT: CPT

## 2024-07-21 PROCEDURE — 71045 X-RAY EXAM CHEST 1 VIEW: CPT | Mod: 26

## 2024-07-21 PROCEDURE — 93010 ELECTROCARDIOGRAM REPORT: CPT

## 2024-07-21 PROCEDURE — 74177 CT ABD & PELVIS W/CONTRAST: CPT | Mod: 26,MC

## 2024-07-21 PROCEDURE — 87086 URINE CULTURE/COLONY COUNT: CPT

## 2024-07-21 PROCEDURE — 93005 ELECTROCARDIOGRAM TRACING: CPT

## 2024-07-21 PROCEDURE — 36415 COLL VENOUS BLD VENIPUNCTURE: CPT

## 2024-07-21 PROCEDURE — 85025 COMPLETE CBC W/AUTO DIFF WBC: CPT

## 2024-07-21 PROCEDURE — 80053 COMPREHEN METABOLIC PANEL: CPT

## 2024-07-21 RX ORDER — LORAZEPAM 0.5 MG
1 TABLET ORAL ONCE
Refills: 0 | Status: DISCONTINUED | OUTPATIENT
Start: 2024-07-21 | End: 2024-07-21

## 2024-07-21 RX ORDER — SODIUM CHLORIDE 0.9 % (FLUSH) 0.9 %
1000 SYRINGE (ML) INJECTION ONCE
Refills: 0 | Status: COMPLETED | OUTPATIENT
Start: 2024-07-21 | End: 2024-07-21

## 2024-07-21 RX ADMIN — Medication 1 MILLIGRAM(S): at 19:49

## 2024-07-21 RX ADMIN — Medication 1000 MILLILITER(S): at 19:48

## 2024-07-21 NOTE — ED PROVIDER NOTE - OBJECTIVE STATEMENT
74 yr old female hx of afib ( prescribed beta blocker but is non complaint), hx of ablation, HLD, HTN, legally blind presents with diffuse abdominal pain and constipation.  + cough. pt recently taking amoxicillin for " mouth sore" prescribed by urgent care.  last BM last night but  " randi". decrease PO intake for months. No known fever or chills, n/v/d, chest pain or urinary complaints. Pt c/o generalized weakness. lives with  who is at bedside.

## 2024-07-21 NOTE — ED PROVIDER NOTE - CLINICAL SUMMARY MEDICAL DECISION MAKING FREE TEXT BOX
Elderly female presents to the ED with abdominal discomfort and constipation.  She is also feeling anxious.  She was recently seen here by our psychiatry team for anxiety.  They referred her to the psychiatrist named Dr. Schaeffer.   patient also complained of constipation.  Tried MiraLAX but only a teaspoon a day which is underdosing.  She was scared to try an enema.  Workup in the ER is consistent with normal labs normal UA and a CAT scan with no acute pathology but does show moderate stool burden.  Will discharge with education on constipation regimen bowel regimen and psych follow-up.

## 2024-07-21 NOTE — ED ADULT NURSE NOTE - OBJECTIVE STATEMENT
pt presents to ER for abdominal pain and constipation. states "I don't even remember the last time I had a bowel movement, I was seen last week and was told nothing was wrong".  at bedside, reports pt has become more forgetful. denies any other medical complaints.

## 2024-07-21 NOTE — ED PROVIDER NOTE - PATIENT PORTAL LINK FT
You can access the FollowMyHealth Patient Portal offered by [facility] by registering at the following website: [email or web address]. By joining [employer]’s FollowMyHealth portal, you will also be able to view your health information using other applications (apps) compatible with our system.

## 2024-07-21 NOTE — ED ADULT NURSE NOTE - NSFALLRISKINTERV_ED_ALL_ED
Assistance with ambulation/Communicate fall risk and risk factors to all staff, patient, and family/Monitor gait and stability/Provide visual cue: yellow wristband, yellow gown, etc/Reinforce activity limits and safety measures with patient and family/Call bell, personal items and telephone in reach/Instruct patient to call for assistance before getting out of bed/chair/stretcher/Non-slip footwear applied when patient is off stretcher/[place] to call system/Physically safe environment - no spills, clutter or unnecessary equipment/Purposeful Proactive Rounding/Room/bathroom lighting operational, light cord in reach

## 2024-07-21 NOTE — ED PROVIDER NOTE - NSFOLLOWUPINSTRUCTIONS_ED_ALL_ED_FT
Must follow-up with your psychiatrist.    Take MiraLAX once a day for the next 5 days.    Use a Fleet enema for constipation 1 time today.    Take Dulcolax twice a day for constipation    Seek immediate medical assistance for any new or worsening symptoms. If you have issues obtaining follow up, please call: [phone] ([phone] or [phone]  to obtain a doctor or specialist who takes your insurance in your area.     Follow with your cardiologist.    Follow-up With your primary care doctor.    Take all prescribed medications exactly as prescribed.

## 2024-07-22 LAB
CULTURE RESULTS: NO GROWTH — SIGNIFICANT CHANGE UP
SPECIMEN SOURCE: SIGNIFICANT CHANGE UP

## 2024-08-25 ENCOUNTER — INPATIENT (INPATIENT)
Facility: HOSPITAL | Age: 74
LOS: 1 days | Discharge: HOME HEALTH | DRG: 69 | End: 2024-08-27
Attending: INTERNAL MEDICINE | Admitting: HOSPITALIST
Payer: MEDICARE

## 2024-08-25 VITALS
RESPIRATION RATE: 18 BRPM | HEIGHT: 62 IN | HEART RATE: 93 BPM | DIASTOLIC BLOOD PRESSURE: 78 MMHG | SYSTOLIC BLOOD PRESSURE: 132 MMHG | OXYGEN SATURATION: 94 %

## 2024-08-25 DIAGNOSIS — Z96.641 PRESENCE OF RIGHT ARTIFICIAL HIP JOINT: Chronic | ICD-10-CM

## 2024-08-25 DIAGNOSIS — Z90.710 ACQUIRED ABSENCE OF BOTH CERVIX AND UTERUS: Chronic | ICD-10-CM

## 2024-08-25 DIAGNOSIS — Z98.890 OTHER SPECIFIED POSTPROCEDURAL STATES: Chronic | ICD-10-CM

## 2024-08-25 DIAGNOSIS — Z86.69 PERSONAL HISTORY OF OTHER DISEASES OF THE NERVOUS SYSTEM AND SENSE ORGANS: Chronic | ICD-10-CM

## 2024-08-25 DIAGNOSIS — G45.9 TRANSIENT CEREBRAL ISCHEMIC ATTACK, UNSPECIFIED: ICD-10-CM

## 2024-08-25 LAB
ADD ON TEST-SPECIMEN IN LAB: SIGNIFICANT CHANGE UP
ALBUMIN SERPL ELPH-MCNC: 3.1 G/DL — LOW (ref 3.3–5)
ALP SERPL-CCNC: 136 U/L — HIGH (ref 40–120)
ALT FLD-CCNC: 298 U/L — HIGH (ref 12–78)
ANION GAP SERPL CALC-SCNC: 6 MMOL/L — SIGNIFICANT CHANGE UP (ref 5–17)
APPEARANCE UR: CLEAR — SIGNIFICANT CHANGE UP
APTT BLD: 28.7 SEC — SIGNIFICANT CHANGE UP (ref 24.5–35.6)
AST SERPL-CCNC: 54 U/L — HIGH (ref 15–37)
BASOPHILS # BLD AUTO: 0.05 K/UL — SIGNIFICANT CHANGE UP (ref 0–0.2)
BASOPHILS NFR BLD AUTO: 0.5 % — SIGNIFICANT CHANGE UP (ref 0–2)
BILIRUB SERPL-MCNC: 0.3 MG/DL — SIGNIFICANT CHANGE UP (ref 0.2–1.2)
BILIRUB UR-MCNC: NEGATIVE — SIGNIFICANT CHANGE UP
BUN SERPL-MCNC: 20 MG/DL — SIGNIFICANT CHANGE UP (ref 7–23)
CALCIUM SERPL-MCNC: 9 MG/DL — SIGNIFICANT CHANGE UP (ref 8.5–10.1)
CHLORIDE SERPL-SCNC: 108 MMOL/L — SIGNIFICANT CHANGE UP (ref 96–108)
CK SERPL-CCNC: 83 U/L — SIGNIFICANT CHANGE UP (ref 26–192)
CO2 SERPL-SCNC: 24 MMOL/L — SIGNIFICANT CHANGE UP (ref 22–31)
COLOR SPEC: YELLOW — SIGNIFICANT CHANGE UP
CREAT SERPL-MCNC: 0.72 MG/DL — SIGNIFICANT CHANGE UP (ref 0.5–1.3)
D DIMER BLD IA.RAPID-MCNC: 855 NG/ML DDU — HIGH
DIFF PNL FLD: NEGATIVE — SIGNIFICANT CHANGE UP
EGFR: 87 ML/MIN/1.73M2 — SIGNIFICANT CHANGE UP
EOSINOPHIL # BLD AUTO: 0.38 K/UL — SIGNIFICANT CHANGE UP (ref 0–0.5)
EOSINOPHIL NFR BLD AUTO: 4.1 % — SIGNIFICANT CHANGE UP (ref 0–6)
GLUCOSE BLDC GLUCOMTR-MCNC: 86 MG/DL — SIGNIFICANT CHANGE UP (ref 70–99)
GLUCOSE SERPL-MCNC: 87 MG/DL — SIGNIFICANT CHANGE UP (ref 70–99)
GLUCOSE UR QL: NEGATIVE MG/DL — SIGNIFICANT CHANGE UP
HCT VFR BLD CALC: 31.8 % — LOW (ref 34.5–45)
HGB BLD-MCNC: 10.4 G/DL — LOW (ref 11.5–15.5)
IMM GRANULOCYTES NFR BLD AUTO: 0.5 % — SIGNIFICANT CHANGE UP (ref 0–0.9)
INR BLD: 0.85 RATIO — SIGNIFICANT CHANGE UP (ref 0.85–1.18)
KETONES UR-MCNC: NEGATIVE MG/DL — SIGNIFICANT CHANGE UP
LACTATE SERPL-SCNC: 0.8 MMOL/L — SIGNIFICANT CHANGE UP (ref 0.7–2)
LEUKOCYTE ESTERASE UR-ACNC: NEGATIVE — SIGNIFICANT CHANGE UP
LYMPHOCYTES # BLD AUTO: 1.97 K/UL — SIGNIFICANT CHANGE UP (ref 1–3.3)
LYMPHOCYTES # BLD AUTO: 21.2 % — SIGNIFICANT CHANGE UP (ref 13–44)
MCHC RBC-ENTMCNC: 30.3 PG — SIGNIFICANT CHANGE UP (ref 27–34)
MCHC RBC-ENTMCNC: 32.7 GM/DL — SIGNIFICANT CHANGE UP (ref 32–36)
MCV RBC AUTO: 92.7 FL — SIGNIFICANT CHANGE UP (ref 80–100)
MONOCYTES # BLD AUTO: 0.78 K/UL — SIGNIFICANT CHANGE UP (ref 0–0.9)
MONOCYTES NFR BLD AUTO: 8.4 % — SIGNIFICANT CHANGE UP (ref 2–14)
NEUTROPHILS # BLD AUTO: 6.05 K/UL — SIGNIFICANT CHANGE UP (ref 1.8–7.4)
NEUTROPHILS NFR BLD AUTO: 65.3 % — SIGNIFICANT CHANGE UP (ref 43–77)
NITRITE UR-MCNC: NEGATIVE — SIGNIFICANT CHANGE UP
PH UR: 6 — SIGNIFICANT CHANGE UP (ref 5–8)
PLATELET # BLD AUTO: 345 K/UL — SIGNIFICANT CHANGE UP (ref 150–400)
POTASSIUM SERPL-MCNC: 4.1 MMOL/L — SIGNIFICANT CHANGE UP (ref 3.5–5.3)
POTASSIUM SERPL-SCNC: 4.1 MMOL/L — SIGNIFICANT CHANGE UP (ref 3.5–5.3)
PROT SERPL-MCNC: 6.5 GM/DL — SIGNIFICANT CHANGE UP (ref 6–8.3)
PROT UR-MCNC: NEGATIVE MG/DL — SIGNIFICANT CHANGE UP
PROTHROM AB SERPL-ACNC: 9.7 SEC — SIGNIFICANT CHANGE UP (ref 9.5–13)
RBC # BLD: 3.43 M/UL — LOW (ref 3.8–5.2)
RBC # FLD: 13.3 % — SIGNIFICANT CHANGE UP (ref 10.3–14.5)
SODIUM SERPL-SCNC: 138 MMOL/L — SIGNIFICANT CHANGE UP (ref 135–145)
SP GR SPEC: >1.03 — HIGH (ref 1–1.03)
TROPONIN I, HIGH SENSITIVITY RESULT: 4.4 NG/L — SIGNIFICANT CHANGE UP
UROBILINOGEN FLD QL: 0.2 MG/DL — SIGNIFICANT CHANGE UP (ref 0.2–1)
WBC # BLD: 9.28 K/UL — SIGNIFICANT CHANGE UP (ref 3.8–10.5)
WBC # FLD AUTO: 9.28 K/UL — SIGNIFICANT CHANGE UP (ref 3.8–10.5)

## 2024-08-25 PROCEDURE — 93306 TTE W/DOPPLER COMPLETE: CPT

## 2024-08-25 PROCEDURE — 0042T: CPT | Mod: MC

## 2024-08-25 PROCEDURE — 97162 PT EVAL MOD COMPLEX 30 MIN: CPT | Mod: GP

## 2024-08-25 PROCEDURE — 71045 X-RAY EXAM CHEST 1 VIEW: CPT | Mod: 26

## 2024-08-25 PROCEDURE — 97116 GAIT TRAINING THERAPY: CPT | Mod: GP

## 2024-08-25 PROCEDURE — 85027 COMPLETE CBC AUTOMATED: CPT

## 2024-08-25 PROCEDURE — 70498 CT ANGIOGRAPHY NECK: CPT | Mod: 26,MC

## 2024-08-25 PROCEDURE — 82962 GLUCOSE BLOOD TEST: CPT

## 2024-08-25 PROCEDURE — 80048 BASIC METABOLIC PNL TOTAL CA: CPT

## 2024-08-25 PROCEDURE — 99285 EMERGENCY DEPT VISIT HI MDM: CPT

## 2024-08-25 PROCEDURE — 70551 MRI BRAIN STEM W/O DYE: CPT | Mod: MC

## 2024-08-25 PROCEDURE — 99223 1ST HOSP IP/OBS HIGH 75: CPT

## 2024-08-25 PROCEDURE — 70450 CT HEAD/BRAIN W/O DYE: CPT | Mod: 26,MC,XU

## 2024-08-25 PROCEDURE — 80061 LIPID PANEL: CPT

## 2024-08-25 PROCEDURE — 93010 ELECTROCARDIOGRAM REPORT: CPT

## 2024-08-25 PROCEDURE — 71275 CT ANGIOGRAPHY CHEST: CPT | Mod: 26,MC

## 2024-08-25 PROCEDURE — 95816 EEG AWAKE AND DROWSY: CPT

## 2024-08-25 PROCEDURE — 70496 CT ANGIOGRAPHY HEAD: CPT | Mod: 26,MC

## 2024-08-25 PROCEDURE — 83036 HEMOGLOBIN GLYCOSYLATED A1C: CPT

## 2024-08-25 PROCEDURE — 70551 MRI BRAIN STEM W/O DYE: CPT | Mod: 26

## 2024-08-25 PROCEDURE — 36415 COLL VENOUS BLD VENIPUNCTURE: CPT

## 2024-08-25 RX ORDER — TRAZODONE HCL 50 MG
1 TABLET ORAL
Refills: 0 | DISCHARGE

## 2024-08-25 RX ORDER — ASPIRIN 81 MG
81 TABLET, DELAYED RELEASE (ENTERIC COATED) ORAL DAILY
Refills: 0 | Status: DISCONTINUED | OUTPATIENT
Start: 2024-08-25 | End: 2024-08-27

## 2024-08-25 RX ORDER — RISPERIDONE 0.25 MG/1
1 TABLET, FILM COATED ORAL
Refills: 0 | DISCHARGE

## 2024-08-25 RX ORDER — MIRTAZAPINE 30 MG
1 TABLET ORAL
Refills: 0 | DISCHARGE

## 2024-08-25 RX ORDER — RISPERIDONE 0.25 MG/1
1 TABLET, FILM COATED ORAL DAILY
Refills: 0 | Status: DISCONTINUED | OUTPATIENT
Start: 2024-08-25 | End: 2024-08-27

## 2024-08-25 RX ORDER — GABAPENTIN 100 MG
200 CAPSULE ORAL THREE TIMES A DAY
Refills: 0 | Status: DISCONTINUED | OUTPATIENT
Start: 2024-08-25 | End: 2024-08-27

## 2024-08-25 RX ORDER — ASPIRIN 81 MG
1 TABLET, DELAYED RELEASE (ENTERIC COATED) ORAL
Refills: 0 | DISCHARGE

## 2024-08-25 RX ORDER — POLYETHYLENE GLYCOL 3350 17 G/17G
17 POWDER, FOR SOLUTION ORAL AT BEDTIME
Refills: 0 | Status: DISCONTINUED | OUTPATIENT
Start: 2024-08-25 | End: 2024-08-27

## 2024-08-25 RX ORDER — BUPROPION HYDROCHLORIDE 150 MG/1
300 TABLET ORAL DAILY
Refills: 0 | Status: DISCONTINUED | OUTPATIENT
Start: 2024-08-25 | End: 2024-08-27

## 2024-08-25 RX ORDER — RISPERIDONE 0.25 MG/1
2 TABLET, FILM COATED ORAL AT BEDTIME
Refills: 0 | Status: DISCONTINUED | OUTPATIENT
Start: 2024-08-25 | End: 2024-08-27

## 2024-08-25 RX ORDER — SOTALOL HYDROCHLORIDE 120 MG/1
0.5 TABLET ORAL
Refills: 0 | DISCHARGE

## 2024-08-25 RX ORDER — SOTALOL HYDROCHLORIDE 120 MG/1
40 TABLET ORAL EVERY 12 HOURS
Refills: 0 | Status: DISCONTINUED | OUTPATIENT
Start: 2024-08-25 | End: 2024-08-27

## 2024-08-25 RX ORDER — PANTOPRAZOLE SODIUM 40 MG
40 TABLET, DELAYED RELEASE (ENTERIC COATED) ORAL
Refills: 0 | Status: DISCONTINUED | OUTPATIENT
Start: 2024-08-25 | End: 2024-08-27

## 2024-08-25 RX ORDER — SODIUM CHLORIDE 9 MG/ML
500 INJECTION INTRAMUSCULAR; INTRAVENOUS; SUBCUTANEOUS ONCE
Refills: 0 | Status: COMPLETED | OUTPATIENT
Start: 2024-08-25 | End: 2024-08-25

## 2024-08-25 RX ORDER — LOSARTAN POTASSIUM 50 MG/1
50 TABLET ORAL DAILY
Refills: 0 | Status: DISCONTINUED | OUTPATIENT
Start: 2024-08-25 | End: 2024-08-27

## 2024-08-25 RX ORDER — BUPROPION HYDROCHLORIDE 150 MG/1
1 TABLET ORAL
Refills: 0 | DISCHARGE

## 2024-08-25 RX ORDER — SENNA 187 MG
2 TABLET ORAL AT BEDTIME
Refills: 0 | Status: DISCONTINUED | OUTPATIENT
Start: 2024-08-25 | End: 2024-08-27

## 2024-08-25 RX ORDER — MIRTAZAPINE 30 MG
15 TABLET ORAL AT BEDTIME
Refills: 0 | Status: DISCONTINUED | OUTPATIENT
Start: 2024-08-25 | End: 2024-08-27

## 2024-08-25 RX ORDER — ESCITALOPRAM OXALATE 10 MG/1
3 TABLET ORAL
Refills: 0 | DISCHARGE

## 2024-08-25 RX ORDER — FOLIC ACID/MULTIVIT,IRON,MINER 0.4MG-18MG
1000 TABLET,CHEWABLE ORAL AT BEDTIME
Refills: 0 | Status: DISCONTINUED | OUTPATIENT
Start: 2024-08-25 | End: 2024-08-27

## 2024-08-25 RX ORDER — GABAPENTIN 100 MG
2 CAPSULE ORAL
Refills: 0 | DISCHARGE

## 2024-08-25 RX ORDER — ESCITALOPRAM OXALATE 10 MG/1
15 TABLET ORAL DAILY
Refills: 0 | Status: DISCONTINUED | OUTPATIENT
Start: 2024-08-25 | End: 2024-08-27

## 2024-08-25 RX ADMIN — BUPROPION HYDROCHLORIDE 300 MILLIGRAM(S): 150 TABLET ORAL at 18:26

## 2024-08-25 RX ADMIN — ESCITALOPRAM OXALATE 15 MILLIGRAM(S): 10 TABLET ORAL at 18:25

## 2024-08-25 RX ADMIN — Medication 200 MILLIGRAM(S): at 18:25

## 2024-08-25 RX ADMIN — RISPERIDONE 1 MILLIGRAM(S): 0.25 TABLET, FILM COATED ORAL at 18:26

## 2024-08-25 RX ADMIN — Medication 80 MILLIGRAM(S): at 21:23

## 2024-08-25 RX ADMIN — RISPERIDONE 2 MILLIGRAM(S): 0.25 TABLET, FILM COATED ORAL at 22:22

## 2024-08-25 RX ADMIN — SODIUM CHLORIDE 500 MILLILITER(S): 9 INJECTION INTRAMUSCULAR; INTRAVENOUS; SUBCUTANEOUS at 10:09

## 2024-08-25 RX ADMIN — Medication 200 MILLIGRAM(S): at 21:23

## 2024-08-25 RX ADMIN — Medication 15 MILLIGRAM(S): at 21:23

## 2024-08-25 RX ADMIN — Medication 81 MILLIGRAM(S): at 15:51

## 2024-08-25 RX ADMIN — Medication 1000 UNIT(S): at 21:23

## 2024-08-25 RX ADMIN — SOTALOL HYDROCHLORIDE 40 MILLIGRAM(S): 120 TABLET ORAL at 21:23

## 2024-08-25 RX ADMIN — LOSARTAN POTASSIUM 50 MILLIGRAM(S): 50 TABLET ORAL at 18:27

## 2024-08-25 NOTE — H&P ADULT - HISTORY OF PRESENT ILLNESS
"74 yr old female hx of chronic A-Fib s/p ablation (not on AC med, prescribed beta blocker but is non-complaint), HLD, HTN, legally blind,  [facility] Psych admit 8/9-22/24 for MDD with psychosis, BIBA from home c/o'ing tremors all 4 extremities, facial numbness, B/L LE weakness noted upon awakening 5:30 this AM.  Last time in [facility] was 10 PM when went to bed.  911 called.  EMS noted initially slurred speech which self-resolved in transport to ED.  Pt states when got up out of bed, she fell down onto floor because B/L leg weakness w/o head injury nor injury to any body part, though pt stated she was self-ambulatory after that.   mg 2 tabs taken at home PTA, though pt report some difficulty swallowing them & allowed them to dissolve in her mouth.  New Psych meds on DC from [facility]: Wellbutrin, Resperdal,  Dosage changes: Lexapro, Remeron."        Pt seen and examined - presented with aphasia however resolved en route to hospital per ED/EMS. ED provider noted left sided facial droop which has also resolved. Pt has not other deficits and speaking fluently. No h/o CVA. Pt took ASA pta. CTA/CTH: negative for acute cva. LKWT 10pm last night when she went to bed  Per E Dprovider became transiently hypoxic to 89% and CTA was ordered by ED provider -> negative for PE however radiologist raised concern for possible pna vs atelectasis. Pt denies fevers or sob. Had cough last week, states she was admitted to psych unit at [facility] where she came into contact with other patients with URI. She has no ssx at this time except for a slight cough with clear sputum. She is a former smoker - quit 14 years ago.             PAST MEDICAL/SURGICAL/FAMILY/SOCIAL HISTORY:    Past Medical, Past Surgical, and Family History:  PAST MEDICAL HISTORY:  Anxiety and depression     Arthritis     COVID-19 vaccine series completed     History of atrial fibrillation     History of cardiac radiofrequency ablation 2021    Hypercholesterolemia     Hypertension     Legally blind Bilaterally - notes L< 20% vision    Muscle cramps     Optic nerve drusen, bilateral     Osteoarthritis of left hip     Primary osteoarthritis of right hip     Status post placement of implantable loop recorder.     PAST SURGICAL HISTORY:  H/O cataract bilateral, 2011    History of breast lump removal left, age 31    History of right hip replacement 2016    S/P hysterectomy 2012    Status post Mohs surgery after 2010's.     FAMILY HISTORY:  Father  Still living? No  Family history of heart attack, Age at diagnosis: Age Unknown    Sibling  Still living? No  Family history of colon cancer, Age at diagnosis: Age Unknown.    ALLERGIES AND HOME MEDICATIONS:   Allergies:        Allergies:  	sulfa drugs: Drug Category, Unknown       Intolerances:  	ramipril: Drug, Other, RAMIPRIL= DRY COUGH    Home Medications:

## 2024-08-25 NOTE — ED ADULT TRIAGE NOTE - CHIEF COMPLAINT QUOTE
Pt complaining of spastic movements after psych medication adjustments. Pt noted to have L facial droop and loss of fluency of speech. Code stroke called at 06:44. Not TNK candidate. LNW 22:00 8/24/24.

## 2024-08-25 NOTE — ED ADULT NURSE NOTE - NSFALLUNIVINTERV_ED_ALL_ED
Bed/Stretcher in lowest position, wheels locked, appropriate side rails in place/Call bell, personal items and telephone in reach/Instruct patient to call for assistance before getting out of bed/chair/stretcher/Non-slip footwear applied when patient is off stretcher/[place] to call system/Physically safe environment - no spills, clutter or unnecessary equipment/Purposeful proactive rounding/Room/bathroom lighting operational, light cord in reach

## 2024-08-25 NOTE — PATIENT PROFILE ADULT - FALL HARM RISK - HARM RISK INTERVENTIONS
Assistance with ambulation/Assistance OOB with selected safe patient handling equipment/Communicate Risk of Fall with Harm to all staff/Monitor gait and stability/Reinforce activity limits and safety measures with patient and family/Tailored Fall Risk Interventions/Visual Cue: Yellow wristband and red socks/Bed in lowest position, wheels locked, appropriate side rails in place/Call bell, personal items and telephone in reach/Instruct patient to call for assistance before getting out of bed or chair/Non-slip footwear when patient is out of bed/[place] to call system/Physically safe environment - no spills, clutter or unnecessary equipment/Purposeful Proactive Rounding/Room/bathroom lighting operational, light cord in reach

## 2024-08-25 NOTE — PATIENT PROFILE ADULT - NSPROPASSIVESMOKEEXPOSURE_GEN_A_NUR
Patient resting quietly in bed with eyes closed without distress, no apparent needs at this time.  Good chest rise and fall.  Sitter outside of room watching patient     No

## 2024-08-25 NOTE — ED PROVIDER NOTE - CLINICAL SUMMARY MEDICAL DECISION MAKING FREE TEXT BOX
74 yr old female hx of A-Fib s/p ablation (not on AC med, prescribed beta blocker but is non-complaint), HLD, HTN, legally blind,  [facility] admit 8/9-22/24 for MDD with psychosis, BIBA from home c/o'ing tremors all 4 extremities, facial numbness, B/L LE weakness noted upon awakening 5:30 this AM.  [place] [facility] [place].  911 called.  EMS noted initially slurred speech which self-resolved in transport to ED.   mg X2 taken at home PTA.  L low face droop noted upon ED arrival, normal speech: Code Stroke initiated upon pt arrival.  Plan: Code Stroke: BGM, stroke CT imaging, EKG, stroke labs, NIHSS, dysphagia screen, d/w Tele-Stroke Neurology.  Monitor, observe, reassess. 74 yr old female hx of A-Fib s/p ablation (not on AC med, prescribed beta blocker but is non-complaint), HLD, HTN, legally blind,  [facility] admit 8/9-22/24 for MDD with psychosis, BIBA from home c/o'ing tremors all 4 extremities, facial numbness, B/L LE weakness noted upon awakening 5:30 this AM.  [place] [facility] [place]  911 called.  EMS noted initially slurred speech which self-resolved in transport to ED.   mg X2 taken at home PTA.  L low face droop noted upon ED arrival, normal speech: Code Stroke initiated upon pt arrival.  Plan: Code Stroke: BGM, stroke CT imaging, EKG, stroke labs, NIHSS, dysphagia screen, d/w Tele-Stroke Neurology.  Monitor, observe, reassess.      See progress notes for case progression including discussion with telestroke neurology, radiologist, labs and CTs results reviewed, discussion with admit hospitalist and admit disposition.

## 2024-08-25 NOTE — ED PROVIDER NOTE - PHYSICAL EXAMINATION
Gen'l: WD/WN adult in NAD, no respiratory discomfort, no sentence shortening, not acutely ill.  Head: NC/AT  Eyes: PERRL, EOMI  ENT: O/P clear, mmm  CV: RRR, normal radial pulse  Lungs: CTA, normal respirations  GI: soft, NT, BS+  : Deferred, no flank nor CVAT  Neck: NT, supple w/o pain, no stiffness nor meningismus  MSK: DOVER x 4, no focal extremity swelling nor tenderness, B/L SLR 35 degrees w/o pain, normal motor  Skin: no tactile warmth, no rash  Neuro: A+O x 4, CN 2 -12 intact, normal speech, no focal motor/sensory deficits Gen'l: Elderly WF adult, alert, anxious, no respiratory discomfort, no sentence shortening, not acutely ill.  Head: NC/AT  Eyes: PERRL, EOMI  ENT: O/P clear, mm mildly dry  CV: RRR, normal radial pulse  Lungs: R lung rhonchi, mild tachypnea w/o retractions  GI: soft, NT, BS+  : Deferred, no flank nor CVAT  Neck: NT, supple w/o pain  MSK: DOVER x 4, no focal extremity swelling nor tenderness, B/L SLR 35 degrees w/o pain, normal motor  Skin: no tactile warmth, no rash  Neuro: A+O x 4, CNs: poor O.S. vision (chronic as per pt, no recent worsening), L lower face mild droop, normal speech, no focal motor/sensory deficits though pt c/o's mild diffuse/bilateral numbness, + mild tremors   Psych: + anxious, no SI/HI, no hallucinations, cooperative

## 2024-08-25 NOTE — ED PROVIDER NOTE - PROGRESS NOTE DETAILS
C MD Marjan:  CT Code Stroke non-con verbal radiologist report: no acute IC pathology. PARIS Phillip MD:  Tele-Stroke Neurology aware of case, agrees NOT a thrombolytic candidate d/t outside of time window, will follow CT images & communicate back. PARIS Phillip MD:  CTA & perfusion images w/o acute abnl. as communicated by Tele-Stroke, no acute endovascular intervention nor further Tele-stroke involvement indicated, advises Tele admit for inpt MRI & formal Neuro consult. PARIS Phillip MD:  L lower face droop improving.  O2 sat on R/A 90-89%.  O2 2lpm NC applied.  CXr wet read by me: FEMI.  Ordering DD. PARIS Phillip MD:  L lower face droop improving.  O2 sat on R/A 90-89%.  O2 2lpm NC applied.  CXR wet read by me: FEMI (unable to currently enter wet read).  Ordering DD, BCs & lacate.  Pt reports + coughing the other day w/o SOB.  Pt denies current SOB. PARIS Phillip MD:  Labs notable for normal WBC/lactate, Troponin; high DD of 855 --> CTA chest done PE protocol, report pending; mild elev. LFTs; U/A negative.  Pt afebrile, no respiratory distress. 500 ml IVF administered. Will follow sats, CTA report & arrange Tele admit for r/o TIA. PARIS Phillip MD:  Labs notable for normal WBC/lactate, Troponin; high DD of 855 --> CTA chest done PE protocol, report pending; mild elev. LFTs; U/A negative.  Pt afebrile, no respiratory distress. 500 ml IVF administered. O2 sat currently 95-97% on R/A, earlier "hypoxia of uncertain etiology, ? malposition of probe.  Will follow sats, CTA report & arrange Tele admit for r/o TIA. PARIS Phillip MD:  CTA report:  No pulmonary embolus.    New bibasilar opacities at the lower lobes and right middle lobe and   scattered clustered nodular opacities may reflect infectious   etiology/pneumonia in the appropriate clinical setting. Bilateral   bronchial thickening.    Admit hospitalist called, phone message left.  Pt remains w/o hypoxia. PARIS Phillip MD:  Case discussed with admit hospitalist Dr. JIE Driver and Telemetry admit accepted.

## 2024-08-25 NOTE — ED ADULT NURSE REASSESSMENT NOTE - NS ED NURSE REASSESS COMMENT FT1
Received report from RN Aubree MORSE Pt resting comfortably in stretcher, resp. even and unlabored. See flowsheet for neuro assessment. A&OX4. Offers no complaints at this time. Updated on plan of care and verbalizes understanding. VS as noted. Pt in no acute distress.

## 2024-08-25 NOTE — H&P ADULT - NSHPLABSRESULTS_GEN_ALL_CORE
LABS: All Labs Reviewed:                        10.4   9.28  )-----------( 345      ( 25 Aug 2024 06:47 )             31.8     08-25    138  |  108  |  20  ----------------------------<  87  4.1   |  24  |  0.72    Ca    9.0      25 Aug 2024 06:47    TPro  6.5  /  Alb  3.1<L>  /  TBili  0.3  /  DBili  x   /  AST  54<H>  /  ALT  298<H>  /  AlkPhos  136<H>  08-25    PT/INR - ( 25 Aug 2024 06:47 )   PT: 9.7 sec;   INR: 0.85 ratio         PTT - ( 25 Aug 2024 06:47 )  PTT:28.7 sec          Blood Culture:       < from: CT Angio Chest PE Protocol w/ IV Cont (08.25.24 @ 08:49) >      No pulmonary embolus.    New bibasilar opacities at the lower lobes and right middle lobe and   scattered clustered nodular opacities may reflect infectious   etiology/pneumonia in the appropriate clinical setting. Bilateral   bronchial thickening. Follow to resolution with repeat chest CT in one   month, or sooner as clinically warranted.    Emphysema. Possible interlobular septal thickening, particularly at the   right upper lobe. Component of pulmonary congestion is not excluded.    Multivessel coronary artery calcifications. Recommend cardiology     < end of copied text >    < from: CT Angio Neck Stroke Protocol w/ IV Cont (08.25.24 @ 07:05) >      IMPRESSION:    CT perfusion:  No perfusion abnormality.    CT angiography neck: No hemodynamically significant stenosis of the   bilateral cervical ICAs using NASCET criteria.  Patent vertebral   arteries.  No evidence of vascular dissection.    CT angiography brain: No large vessel occlusion. No evidence of aneurysm.    --- End of Report ---    < end of copied text >

## 2024-08-25 NOTE — ED PROVIDER NOTE - OBJECTIVE STATEMENT
74 yr old female hx of A-Fib s/p ablation (not on AC med, prescribed beta blocker but is non-complaint), HLD, HTN, legally blind,  [facility] Psych admit 8/9-22/24 for MDD with psychosis, BIBA from home c/o'ing tremors all 4 extremities, facial numbness, B/L LE weakness noted upon awakening 5:30 this AM.  [place] [facility] [place] 10 [place] when went to bed.  911 called.  EMS noted initially slurred speech which self-resolved in transport to ED.  Pt states when got up out of bed, she fell down onto floor because B/L leg weakness w/o head injury nor injury to any body part, though pt stated she was self-ambulatory after that. 74 yr old female hx of A-Fib s/p ablation (not on AC med, prescribed beta blocker but is non-complaint), HLD, HTN, legally blind,  [facility] Psych admit 8/9-22/24 for MDD with psychosis, BIBA from home c/o'ing tremors all 4 extremities, facial numbness, B/L LE weakness noted upon awakening 5:30 this AM.  Last time in [facility] was 10 PM when went to bed.  911 called.  EMS noted initially slurred speech which self-resolved in transport to ED.  Pt states when got up out of bed, she fell down onto floor because B/L leg weakness w/o head injury nor injury to any body part, though pt stated she was self-ambulatory after that.   mg 2 tabs taken at home PTA, though pt report some difficulty swallowing them & allowed them to dissolve in her mouth.  New Psych meds on DC from . Cat: Wellbutrin, Resperdal,  Dosage changes: Lexapro, Remeron.

## 2024-08-25 NOTE — CONSULT NOTE ADULT - SUBJECTIVE AND OBJECTIVE BOX
Patient is a 74y old  Female who presents with a chief complaint of facial droop (25 Aug 2024 12:22)    HPI:  Ms. Villatoro is a 74 yr old woman with a history of chronic A-Fib s/p ablation (not on AC med, prescribed beta blocker but is non-complaint), HLD, HTN, legally blind, recent admission to [facility] Psychiatric unit 8/9-22/24 for MDD with psychosis, BIBA from home complaining of tremors in all 4 extremities, facial numbness, B/L LE weakness noted upon awakening at 5:30 this AM. Her last known normal was 10 PM on 8/24.  911 called.  EMS noted initially slurred speech which self-resolved in transport to ED.  Pt states when got up out of bed, she fell down onto floor because B/L leg weakness w/o head injury nor injury to any body part, though pt stated she was self-ambulatory after that.   mg 2 tabs taken at home PTA, though pt report some difficulty swallowing them & allowed them to dissolve in her mouth.  After her recent psychiatric admission at [facility], she was started on Wellbutrin, Risperdal. Doses of Lexapro and Remeron were changed.         Pt seen and examined - presented with aphasia however resolved en route to hospital per ED/EMS. ED provider noted left sided facial droop which has also resolved. Pt has not other deficits and speaking fluently. No h/o CVA. Pt took ASA pta. CTA/CTH: negative for acute cva. LKWT 10pm last night when she went to bed  Per E Dprovider became transiently hypoxic to 89% and CTA was ordered by ED provider -> negative for PE however radiologist raised concern for possible pna vs atelectasis. Pt denies fevers or sob. Had cough last week, states she was admitted to psych unit at [facility] where she came into contact with other patients with URI. She has no ssx at this time except for a slight cough with clear sputum. She is a former smoker - quit 14 years ago.             PAST MEDICAL/SURGICAL/FAMILY/SOCIAL HISTORY:    Past Medical, Past Surgical, and Family History:  PAST MEDICAL HISTORY:  Anxiety and depression     Arthritis     COVID-19 vaccine series completed     History of atrial fibrillation     History of cardiac radiofrequency ablation 2021    Hypercholesterolemia     Hypertension     Legally blind Bilaterally - notes L< 20% vision    Muscle cramps     Optic nerve drusen, bilateral     Osteoarthritis of left hip     Primary osteoarthritis of right hip     Status post placement of implantable loop recorder.     PAST SURGICAL HISTORY:  H/O cataract bilateral, 2011    History of breast lump removal left, age 31    History of right hip replacement 2016    S/P hysterectomy 2012    Status post Mohs surgery after 2010's.     FAMILY HISTORY:  Father  Still living? No  Family history of heart attack, Age at diagnosis: Age Unknown    Sibling  Still living? No  Family history of colon cancer, Age at diagnosis: Age Unknown.    ALLERGIES AND HOME MEDICATIONS:   Allergies:        Allergies:  	sulfa drugs: Drug Category, Unknown       Intolerances:  	ramipril: Drug, Other, RAMIPRIL= DRY COUGH    Home Medications:      (25 Aug 2024 12:22)      PAST MEDICAL & SURGICAL HISTORY:  Primary osteoarthritis of right hip      Hypertension      Hypercholesterolemia      Arthritis      Muscle cramps      Legally blind  Bilaterally - notes L< 20% vision      Anxiety and depression      Optic nerve drusen, bilateral      Osteoarthritis of left hip      COVID-19 vaccine series completed      History of cardiac radiofrequency ablation  2021      History of atrial fibrillation      Status post placement of implantable loop recorder      H/O cataract  bilateral, 2011      S/P hysterectomy  2012      Status post Mohs surgery  after 2010's      History of right hip replacement  2016      History of breast lump removal  left, age 31          FAMILY HISTORY:  Family history of heart attack (Father)    Family history of colon cancer (Sibling)        Social Hx:  Nonsmoker, no drug or alcohol use    MEDICATIONS  (STANDING):  aspirin enteric coated 81 milliGRAM(s) Oral daily  atorvastatin 80 milliGRAM(s) Oral at bedtime  buPROPion XL (24-Hour) . 300 milliGRAM(s) Oral daily  cholecalciferol 1000 Unit(s) Oral at bedtime  escitalopram 15 milliGRAM(s) Oral daily  gabapentin 200 milliGRAM(s) Oral three times a day  losartan 50 milliGRAM(s) Oral daily  mirtazapine 15 milliGRAM(s) Oral at bedtime  pantoprazole    Tablet 40 milliGRAM(s) Oral before breakfast  risperiDONE   Tablet 2 milliGRAM(s) Oral at bedtime  risperiDONE   Tablet 1 milliGRAM(s) Oral daily  sotalol. 40 milliGRAM(s) Oral every 12 hours       Allergies    sulfa drugs (Unknown)    Intolerances    ramipril (Other)      ROS: Pertinent positives in HPI, all other ROS were reviewed and are negative.      Vital Signs Last 24 Hrs  T(C): 36.8 (25 Aug 2024 10:13), Max: 36.8 (25 Aug 2024 10:13)  T(F): 98.2 (25 Aug 2024 10:13), Max: 98.2 (25 Aug 2024 10:13)  HR: 80 (25 Aug 2024 13:15) (76 - 93)  BP: 135/70 (25 Aug 2024 13:15) (110/81 - 137/71)  BP(mean): 91 (25 Aug 2024 07:37) (91 - 91)  RR: 17 (25 Aug 2024 13:15) (17 - 20)  SpO2: 99% (25 Aug 2024 13:15) (94% - 100%)    Parameters below as of 25 Aug 2024 13:15  Patient On (Oxygen Delivery Method): room air            Constitutional: awake and alert.  HEENT: PERRLA, EOMI,   Neck: Supple.  Respiratory: Breath sounds are clear bilaterally  Cardiovascular: S1 and S2, regular / irregular rhythm  Gastrointestinal: soft, nontender  Extremities:  no edema  Vascular: Caritid Bruit - no  Musculoskeletal: no joint swelling/tenderness, no abnormal movements  Skin: No rashes    Neurological exam:  HF: A x O x 3. Appropriately interactive, normal affect. Speech fluent, No Aphasia or paraphasic errors. Naming /repetition intact   CN: ROSALINDA, EOMI, VFF, facial sensation normal, no NLFD, tongue midline, Palate moves equally, SCM equal bilaterally  Motor: No pronator drift, Strength 5/5 in all 4 ext, normal bulk and tone, no tremor, rigidity or bradykinesia.    Sens: Intact to light touch / PP/ VS/ JS    Reflexes: Symmetric and normal . BJ 2+, BR 2+, KJ 2+, AJ 2+, downgoing toes b/l  Coord:  No FNFA, dysmetria, ROSITA intact   Gait/Balance: Normal/Cannot test    NIHSS:          Labs:   08-25    138  |  108  |  20  ----------------------------<  87  4.1   |  24  |  0.72    Ca    9.0      25 Aug 2024 06:47    TPro  6.5  /  Alb  3.1<L>  /  TBili  0.3  /  DBili  x   /  AST  54<H>  /  ALT  298<H>  /  AlkPhos  136<H>  08-25                              10.4   9.28  )-----------( 345      ( 25 Aug 2024 06:47 )             31.8       Radiology:    CT brain 8/25/24:  No evidence of acute intracranial hemorrhage, midline shift or CT   evidence of acute territorial infarct.    Please note that the cerebellum was not completely included on the   noncontrast study limiting evaluation. If the patient's symptoms persist,   consider short interval follow-up head CT or brain MRI if there are no   MRI contraindications.      CTA head and neck, CT perfusion 8/25/24:  CT perfusion:  No perfusion abnormality.    CT angiography neck: No hemodynamically significant stenosis of the   bilateral cervical ICAs using NASCET criteria.  Patent vertebral   arteries.  No evidence of vascular dissection.    CT angiography brain: No large vessel occlusion. No evidence of aneurysm.   Patient is a 74y old  Female who presents with a chief complaint of facial droop (25 Aug 2024 12:22)    HPI:  Ms. Villatoro is a 74 yr old woman with a history of chronic A-Fib s/p ablation (not on AC med, prescribed beta blocker but is non-complaint), HLD, HTN, legally blind, recent admission to [facility] Psychiatric unit 8/9-22/24 for MDD with psychosis, BIBA from home complaining of tremors in all 4 extremities, facial numbness, B/L LE weakness noted upon awakening at 5:30 this AM. Her last known normal was 10 PM on 8/24.  She states that she felt fine until she put her feet on the ground at which point she felt like her legs were noodles. 911 called.  She states that the tremors were a violent shaking of all four extremities without loss of consciousness. EMS noted initially slurred speech which self-resolved in transport to ED.  Pt states when got up out of bed, she fell down onto floor because B/L leg weakness w/o head injury nor injury to any body part, though pt stated she was self-ambulatory after that.   mg 2 tabs taken at home PTA, though pt report some difficulty swallowing them & allowed them to dissolve in her mouth.  After her recent psychiatric admission at [facility], she was started on Wellbutrin, Risperdal. Doses of Lexapro and Remeron were changed.     Her  is at the bedside and states that there ws no inability to speak. She had some mild slurred speech. She is not sure how long the symptoms lasted. Slurred speech had resolved by the time she reached the ED.   She was noted to have a left sided facial droop in the ED.   She was transiently hypoxic to 89% in the ED. CTA was negative for PE but there was concern for pneumonia vs atelectasis              PAST MEDICAL/SURGICAL/FAMILY/SOCIAL HISTORY:    Past Medical, Past Surgical, and Family History:  PAST MEDICAL HISTORY:  Anxiety and depression     Arthritis     COVID-19 vaccine series completed     History of atrial fibrillation     History of cardiac radiofrequency ablation 2021    Hypercholesterolemia     Hypertension     Legally blind Bilaterally - notes L< 20% vision    Muscle cramps     Optic nerve drusen, bilateral     Osteoarthritis of left hip     Primary osteoarthritis of right hip     Status post placement of implantable loop recorder.     PAST SURGICAL HISTORY:  H/O cataract bilateral, 2011    History of breast lump removal left, age 31    History of right hip replacement 2016    S/P hysterectomy 2012    Status post Mohs surgery after 2010's.     FAMILY HISTORY:  Father  Still living? No  Family history of heart attack, Age at diagnosis: Age Unknown    Sibling  Still living? No  Family history of colon cancer, Age at diagnosis: Age Unknown.    ALLERGIES AND HOME MEDICATIONS:   Allergies:        Allergies:  	sulfa drugs: Drug Category, Unknown       Intolerances:  	ramipril: Drug, Other, RAMIPRIL= DRY COUGH    Home Medications:      (25 Aug 2024 12:22)      PAST MEDICAL & SURGICAL HISTORY:  Primary osteoarthritis of right hip      Hypertension      Hypercholesterolemia      Arthritis      Muscle cramps      Legally blind  Bilaterally - notes L< 20% vision      Anxiety and depression      Optic nerve drusen, bilateral      Osteoarthritis of left hip      COVID-19 vaccine series completed      History of cardiac radiofrequency ablation  2021      History of atrial fibrillation      Status post placement of implantable loop recorder      H/O cataract  bilateral, 2011      S/P hysterectomy  2012      Status post Mohs surgery  after 2010's      History of right hip replacement  2016      History of breast lump removal  left, age 31          FAMILY HISTORY:  Family history of heart attack (Father)    Family history of colon cancer (Sibling)        Social Hx:  Nonsmoker, no drug or alcohol use    MEDICATIONS  (STANDING):  aspirin enteric coated 81 milliGRAM(s) Oral daily  atorvastatin 80 milliGRAM(s) Oral at bedtime  buPROPion XL (24-Hour) . 300 milliGRAM(s) Oral daily  cholecalciferol 1000 Unit(s) Oral at bedtime  escitalopram 15 milliGRAM(s) Oral daily  gabapentin 200 milliGRAM(s) Oral three times a day  losartan 50 milliGRAM(s) Oral daily  mirtazapine 15 milliGRAM(s) Oral at bedtime  pantoprazole    Tablet 40 milliGRAM(s) Oral before breakfast  risperiDONE   Tablet 2 milliGRAM(s) Oral at bedtime  risperiDONE   Tablet 1 milliGRAM(s) Oral daily  sotalol. 40 milliGRAM(s) Oral every 12 hours       Allergies    sulfa drugs (Unknown)    Intolerances    ramipril (Other)      ROS: Pertinent positives in HPI, all other ROS were reviewed and are negative.      Vital Signs Last 24 Hrs  T(C): 36.8 (25 Aug 2024 10:13), Max: 36.8 (25 Aug 2024 10:13)  T(F): 98.2 (25 Aug 2024 10:13), Max: 98.2 (25 Aug 2024 10:13)  HR: 80 (25 Aug 2024 13:15) (76 - 93)  BP: 135/70 (25 Aug 2024 13:15) (110/81 - 137/71)  BP(mean): 91 (25 Aug 2024 07:37) (91 - 91)  RR: 17 (25 Aug 2024 13:15) (17 - 20)  SpO2: 99% (25 Aug 2024 13:15) (94% - 100%)    Parameters below as of 25 Aug 2024 13:15  Patient On (Oxygen Delivery Method): room air            Constitutional: awake and alert.  HEENT: PERRLA, EOMI,   Neck: Supple.  Respiratory: Breath sounds are clear bilaterally  Cardiovascular: S1 and S2, regular / irregular rhythm  Gastrointestinal: soft, nontender  Extremities:  no edema  Musculoskeletal: no joint swelling/tenderness, no abnormal movements  Skin: No rashes    Neurological exam:  HF: Awake and alert.  Appropriately interactive, normal affect. Speech fluent, No Aphasia or paraphasic errors. Naming /repetition intact   CN: ROSALINDA, EOMI, absent vision in left eye,  facial sensation normal, no NLFD, tongue midline, Palate moves equally, SCM equal bilaterally  Motor: No pronator drift, Strength 5/5 in all 4 ext, normal bulk and tone, no tremor, rigidity or bradykinesia.    Sens: Intact to light touch   Reflexes: Symmetric and normal . BJ 1+, BR 1+, KJ 1+,  downgoing toes b/l  Coord:  finger to nose intact b/l, heel to shin intact b/l  Gait/Balance: Not tested    NIHSS: 1 (for vision loss)          Labs:   08-25    138  |  108  |  20  ----------------------------<  87  4.1   |  24  |  0.72    Ca    9.0      25 Aug 2024 06:47    TPro  6.5  /  Alb  3.1<L>  /  TBili  0.3  /  DBili  x   /  AST  54<H>  /  ALT  298<H>  /  AlkPhos  136<H>  08-25                              10.4   9.28  )-----------( 345      ( 25 Aug 2024 06:47 )             31.8       Radiology:    CT brain 8/25/24:  No evidence of acute intracranial hemorrhage, midline shift or CT   evidence of acute territorial infarct.    Please note that the cerebellum was not completely included on the   noncontrast study limiting evaluation. If the patient's symptoms persist,   consider short interval follow-up head CT or brain MRI if there are no   MRI contraindications.      CTA head and neck, CT perfusion 8/25/24:  CT perfusion:  No perfusion abnormality.    CT angiography neck: No hemodynamically significant stenosis of the   bilateral cervical ICAs using NASCET criteria.  Patent vertebral   arteries.  No evidence of vascular dissection.    CT angiography brain: No large vessel occlusion. No evidence of aneurysm.    MR head 8/25/24:  No evidence for intracranial mass, acute territorial infarct, acute   intracranial hemorrhage, or midline shift.

## 2024-08-25 NOTE — ED ADULT NURSE NOTE - OBJECTIVE STATEMENT
73 y/o female presents to ED c/o tremors in all 4 extremities, numbness in face, b/l leg weakness, slurred speech that has since resolved. Pt reports getting up from bed at 530AM and felt her legs become "wobbly" and fell. -headstrike, -LOC, - blood thinners. Pt was discharged from [facility] for MDD and placed on new psych medication starting x1 week ago. PMHx afib, 75 y/o female presents to ED c/o tremors in all 4 extremities, numbness in face, b/l leg weakness, slurred speech that has since resolved. Pt reports getting up from bed at 530AM and felt her legs become "wobbly" and fell, last known well 10PM before going to bed. -headstrike, -LOC, - blood thinners. Pt was discharged from [facility] for MDD and placed on new psych medication starting x1 week ago. PMHx afib,

## 2024-08-25 NOTE — H&P ADULT - NSHPPHYSICALEXAM_GEN_ALL_CORE
ICU Vital Signs Last 24 Hrs  T(C): 36.8 (25 Aug 2024 10:13), Max: 36.8 (25 Aug 2024 10:13)  T(F): 98.2 (25 Aug 2024 10:13), Max: 98.2 (25 Aug 2024 10:13)  HR: 79 (25 Aug 2024 10:13) (79 - 93)  BP: 130/83 (25 Aug 2024 10:13) (110/81 - 132/78)  BP(mean): 91 (25 Aug 2024 07:37) (91 - 91)  ABP: --  ABP(mean): --  RR: 20 (25 Aug 2024 10:13) (18 - 20)  SpO2: 100% (25 Aug 2024 10:13) (94% - 100%)    O2 Parameters below as of 25 Aug 2024 10:13  Patient On (Oxygen Delivery Method): room air            PHYSICAL EXAM:    Constitutional: NAD, awake and alert  HEENT: PERR, EOMI, Normal Hearing, MMM  Neck: Soft and supple, No LAD, No JVD  Respiratory: Breath sounds are clear bilaterally, No wheezing, rales or rhonchi  Cardiovascular: S1 and S2, regular rate and rhythm, no Murmurs, gallops or rubs  Gastrointestinal: Bowel Sounds present, soft, nontender, nondistended, no guarding, no rebound  Extremities: No peripheral edema  Vascular: 2+ peripheral pulses  Neurological: A/O x 3, no focal deficits  Musculoskeletal: 5/5 strength b/l upper and lower extremities  Skin: No rashes

## 2024-08-25 NOTE — PATIENT PROFILE ADULT - VISION (WITH CORRECTIVE LENSES IF THE PATIENT USUALLY WEARS THEM):
left eye blindness/Partially impaired: cannot see medication labels or newsprint, but can see obstacles in path, and the surrounding layout; can count fingers at arm's length

## 2024-08-25 NOTE — PATIENT PROFILE ADULT - TRANSPORTATION
Egd on 3/23/21 with Dr Cain Lamb at [facility]  Prep instructions mailed to the patient  no

## 2024-08-26 ENCOUNTER — RESULT REVIEW (OUTPATIENT)
Age: 74
End: 2024-08-26

## 2024-08-26 DIAGNOSIS — G45.9 TRANSIENT CEREBRAL ISCHEMIC ATTACK, UNSPECIFIED: ICD-10-CM

## 2024-08-26 DIAGNOSIS — I48.91 UNSPECIFIED ATRIAL FIBRILLATION: ICD-10-CM

## 2024-08-26 LAB
A1C WITH ESTIMATED AVERAGE GLUCOSE RESULT: 6 % — HIGH (ref 4–5.6)
ANION GAP SERPL CALC-SCNC: 6 MMOL/L — SIGNIFICANT CHANGE UP (ref 5–17)
BUN SERPL-MCNC: 15 MG/DL — SIGNIFICANT CHANGE UP (ref 7–23)
CALCIUM SERPL-MCNC: 9.4 MG/DL — SIGNIFICANT CHANGE UP (ref 8.5–10.1)
CHLORIDE SERPL-SCNC: 105 MMOL/L — SIGNIFICANT CHANGE UP (ref 96–108)
CHOLEST SERPL-MCNC: 185 MG/DL — SIGNIFICANT CHANGE UP
CO2 SERPL-SCNC: 26 MMOL/L — SIGNIFICANT CHANGE UP (ref 22–31)
CREAT SERPL-MCNC: 0.6 MG/DL — SIGNIFICANT CHANGE UP (ref 0.5–1.3)
EGFR: 94 ML/MIN/1.73M2 — SIGNIFICANT CHANGE UP
ESTIMATED AVERAGE GLUCOSE: 126 MG/DL — HIGH (ref 68–114)
GLUCOSE BLDC GLUCOMTR-MCNC: 124 MG/DL — HIGH (ref 70–99)
GLUCOSE BLDC GLUCOMTR-MCNC: 98 MG/DL — SIGNIFICANT CHANGE UP (ref 70–99)
GLUCOSE SERPL-MCNC: 88 MG/DL — SIGNIFICANT CHANGE UP (ref 70–99)
HDLC SERPL-MCNC: 70 MG/DL — SIGNIFICANT CHANGE UP
LIPID PNL WITH DIRECT LDL SERPL: 108 MG/DL — HIGH
NON HDL CHOLESTEROL: 115 MG/DL — SIGNIFICANT CHANGE UP
POTASSIUM SERPL-MCNC: 4 MMOL/L — SIGNIFICANT CHANGE UP (ref 3.5–5.3)
POTASSIUM SERPL-SCNC: 4 MMOL/L — SIGNIFICANT CHANGE UP (ref 3.5–5.3)
SODIUM SERPL-SCNC: 137 MMOL/L — SIGNIFICANT CHANGE UP (ref 135–145)
TRIGL SERPL-MCNC: 34 MG/DL — SIGNIFICANT CHANGE UP

## 2024-08-26 PROCEDURE — 93306 TTE W/DOPPLER COMPLETE: CPT | Mod: 26

## 2024-08-26 PROCEDURE — 99223 1ST HOSP IP/OBS HIGH 75: CPT

## 2024-08-26 PROCEDURE — 99232 SBSQ HOSP IP/OBS MODERATE 35: CPT

## 2024-08-26 PROCEDURE — 95816 EEG AWAKE AND DROWSY: CPT | Mod: 26

## 2024-08-26 PROCEDURE — 99233 SBSQ HOSP IP/OBS HIGH 50: CPT

## 2024-08-26 PROCEDURE — 99221 1ST HOSP IP/OBS SF/LOW 40: CPT

## 2024-08-26 RX ORDER — APIXABAN 5 MG/1
5 TABLET, FILM COATED ORAL EVERY 12 HOURS
Refills: 0 | Status: DISCONTINUED | OUTPATIENT
Start: 2024-08-26 | End: 2024-08-27

## 2024-08-26 RX ADMIN — Medication 80 MILLIGRAM(S): at 22:00

## 2024-08-26 RX ADMIN — RISPERIDONE 1 MILLIGRAM(S): 0.25 TABLET, FILM COATED ORAL at 11:44

## 2024-08-26 RX ADMIN — APIXABAN 5 MILLIGRAM(S): 5 TABLET, FILM COATED ORAL at 22:00

## 2024-08-26 RX ADMIN — APIXABAN 5 MILLIGRAM(S): 5 TABLET, FILM COATED ORAL at 14:23

## 2024-08-26 RX ADMIN — Medication 40 MILLIGRAM(S): at 05:32

## 2024-08-26 RX ADMIN — Medication 200 MILLIGRAM(S): at 22:03

## 2024-08-26 RX ADMIN — BUPROPION HYDROCHLORIDE 300 MILLIGRAM(S): 150 TABLET ORAL at 11:44

## 2024-08-26 RX ADMIN — Medication 1000 UNIT(S): at 22:00

## 2024-08-26 RX ADMIN — Medication 200 MILLIGRAM(S): at 14:24

## 2024-08-26 RX ADMIN — LOSARTAN POTASSIUM 50 MILLIGRAM(S): 50 TABLET ORAL at 11:44

## 2024-08-26 RX ADMIN — SOTALOL HYDROCHLORIDE 40 MILLIGRAM(S): 120 TABLET ORAL at 11:43

## 2024-08-26 RX ADMIN — Medication 81 MILLIGRAM(S): at 11:44

## 2024-08-26 RX ADMIN — ESCITALOPRAM OXALATE 15 MILLIGRAM(S): 10 TABLET ORAL at 11:44

## 2024-08-26 RX ADMIN — RISPERIDONE 2 MILLIGRAM(S): 0.25 TABLET, FILM COATED ORAL at 22:01

## 2024-08-26 RX ADMIN — Medication 200 MILLIGRAM(S): at 05:29

## 2024-08-26 RX ADMIN — Medication 5 MILLIGRAM(S): at 22:33

## 2024-08-26 RX ADMIN — Medication 15 MILLIGRAM(S): at 22:00

## 2024-08-26 NOTE — CONSULT NOTE ADULT - PROBLEM SELECTOR RECOMMENDATION 9
-h/o afib s/p ablation 4 yrs ago. currently NSR  -CHADS2-VASc=5 (>75, HTN, TIA)  -pt describes plan for holding anticoag postablation. w/ ILR monitoring.  -unfortunately ILR battery dead - no arrhythmia information in past few months  prior to recent event.    -At this time given suspicion for CVA I would favor restarting anticoag.  -Agree w/ EP consult - they plan for MCOT w/ bridge to ILR implant as OP  (cannot do ILR implant now - no EPs currently available).

## 2024-08-26 NOTE — DIETITIAN INITIAL EVALUATION ADULT - ORAL INTAKE PTA/DIET HISTORY
Lives at home w/  that cooks/grocery shops for household. Endorses decreased appetite x 1-2 mo; consuming <50% of ENN 2/2 depression, s/p inpatient at [facility] (8/9-8/24) - "hated food."

## 2024-08-26 NOTE — PHYSICAL THERAPY INITIAL EVALUATION ADULT - PATIENT PROFILE REVIEW, REHAB EVAL
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

## 2024-08-26 NOTE — PHYSICAL THERAPY INITIAL EVALUATION ADULT - PATIENT/FAMILY/SIGNIFICANT OTHER GOALS STATEMENT, PT EVAL
pt eager to moboilize ,does not believe she needs assistive device, very concerned with husbands health issues and weight loss

## 2024-08-26 NOTE — PHYSICAL THERAPY INITIAL EVALUATION ADULT - ACTIVE RANGE OF MOTION EXAMINATION, REHAB EVAL
claudia. upper extremity Active ROM was WNL (within normal limits)/bilateral  lower extremity Active ROM was WFL (within functional limits)

## 2024-08-26 NOTE — CONSULT NOTE ADULT - SUBJECTIVE AND OBJECTIVE BOX
CHIEF COMPLAINT:    HPI:  "74 yr old female hx of chronic A-Fib s/p ablation (not on AC med, prescribed beta blocker but is non-complaint), HLD, HTN, legally blind,  [facility] Psych admit - for MDD with psychosis, BIBA from home c/o'ing tremors all 4 extremities, facial numbness, B/L LE weakness noted upon awakening 5:30 this AM.  Last time in [facility] was 10 PM when went to bed.  911 called.  EMS noted initially slurred speech which self-resolved in transport to ED.  Pt states when got up out of bed, she fell down onto floor because B/L leg weakness w/o head injury nor injury to any body part, though pt stated she was self-ambulatory after that.   mg 2 tabs taken at home PTA, though pt report some difficulty swallowing them & allowed them to dissolve in her mouth.  New Psych meds on DC from [facility]: Wellbutrin, Resperdal,  Dosage changes: Lexapro, Remeron."    Pt seen and examined - presented with aphasia however resolved en route to hospital per ED/EMS. ED provider noted left sided facial droop which has also resolved. Pt has not other deficits and speaking fluently. No h/o CVA. Pt took ASA pta. CTA/CTH: negative for acute cva. LKWT 10pm last night when she went to bed  Per E Dprovider became transiently hypoxic to 89% and CTA was ordered by ED provider -> negative for PE however radiologist raised concern for possible pna vs atelectasis. Pt denies fevers or sob. Had cough last week, states she was admitted to psych unit at [facility] where she came into contact with other patients with URI. She has no ssx at this time except for a slight cough with clear sputum. She is a former smoker - quit 14 years ago.     Admitted for TIA.  Also w/ h/o afib s/p ablation, s/p ILR 4 yrs ago.  Review symptoms w/ pt - bilateral leg weakness no h/o palpitations, dyspnea, chest pain.  Interrogation of ILR this AM shows battery was dead for past few months.  Pt had afib ablation w/ Dr. jasiel glez then changed to Dr. snow.  Dr. branch planned to change ILR but pt had refused in the past.        PAST MEDICAL/SURGICAL/FAMILY/SOCIAL HISTORY:    Past Medical, Past Surgical, and Family History:  PAST MEDICAL HISTORY:  Anxiety and depression     Arthritis     COVID-19 vaccine series completed     History of atrial fibrillation     History of cardiac radiofrequency ablation     Hypercholesterolemia     Hypertension     Legally blind Bilaterally - notes L< 20% vision    Muscle cramps     Optic nerve drusen, bilateral     Osteoarthritis of left hip     Primary osteoarthritis of right hip     Status post placement of implantable loop recorder.     PAST SURGICAL HISTORY:  H/O cataract bilateral,     History of breast lump removal left, age 31    History of right hip replacement 2016    S/P hysterectomy 2012    Status post Mohs surgery after .     FAMILY HISTORY:  Father  Still living? No  Family history of heart attack, Age at diagnosis: Age Unknown    Sibling  Still living? No  Family history of colon cancer, Age at diagnosis: Age Unknown.    ALLERGIES AND HOME MEDICATIONS:   Allergies:        Allergies:  	sulfa drugs: Drug Category, Unknown       Intolerances:  	ramipril: Drug, Other, RAMIPRIL= DRY COUGH    Home Medications:      (25 Aug 2024 12:22)        PAST MEDICAL AND SURGICAL HISTORY:  PAST MEDICAL & SURGICAL HISTORY:  Primary osteoarthritis of right hip      Hypertension      Hypercholesterolemia      Arthritis      Muscle cramps      Legally blind  Bilaterally - notes L< 20% vision      Anxiety and depression      Optic nerve drusen, bilateral      Osteoarthritis of left hip      COVID-19 vaccine series completed      History of cardiac radiofrequency ablation        History of atrial fibrillation      Status post placement of implantable loop recorder      H/O cataract  bilateral,       S/P hysterectomy  2012      Status post Mohs surgery  after       History of right hip replacement  2016      History of breast lump removal  left, age 31          ALLERGIES:  Allergies    sulfa drugs (Unknown)    Intolerances    ramipril (Other)      SOCIAL HISTORY:  Social History:      FAMILY  HISTORY:  FAMILY HISTORY:  Family history of heart attack (Father)    Family history of colon cancer (Sibling)        MEDICATIONS:  OUTPATIENT:  Home Medications:  aspirin 81 mg oral delayed release tablet: 1 tab(s) orally once a day (25 Aug 2024 13:24)  buPROPion 300 mg/24 hours (XL) oral tablet, extended release: 1 tab(s) orally once a day (25 Aug 2024 12:11)  escitalopram 5 mg oral tablet: 3 tab(s) orally once a day (25 Aug 2024 12:11)  gabapentin 100 mg oral capsule: 2 cap(s) orally 3 times a day (25 Aug 2024 12:14)  losartan 50 mg oral tablet: 1 tab(s) orally once a day (at bedtime) (25 Aug 2024 13:21)  mirtazapine 15 mg oral tablet: 1 tab(s) orally once a day (at bedtime) (25 Aug 2024 13:24)  polyethylene glycol 3350 oral powder for reconstitution: 17 gram(s) orally once a day (at bedtime), As Needed (25 Aug 2024 13:21)  risperiDONE 1 mg oral tablet, disintegratin tab(s) orally once a day at 9am (25 Aug 2024 13:24)  risperiDONE 2 mg oral tablet, disintegratin tab(s) orally once a day at 9pm (25 Aug 2024 13:24)  senna oral tablet: 2 tab(s) orally once a day (at bedtime), As Needed (25 Aug 2024 13:25)  sotalol 80 mg oral tablet: 0.5 tab(s) orally 2 times a day (25 Aug 2024 12:12)  traZODone 50 mg oral tablet: 1 tab(s) orally once a day (at bedtime) as needed for  sleep (25 Aug 2024 12:21)  Vitamin D3 1000 intl units oral capsule: 1 cap(s) orally once a day (at bedtime) (25 Aug 2024 13:25)      INPATIENT:  MEDICATIONS  (STANDING):  apixaban 5 milliGRAM(s) Oral every 12 hours  aspirin enteric coated 81 milliGRAM(s) Oral daily  atorvastatin 80 milliGRAM(s) Oral at bedtime  buPROPion XL (24-Hour) . 300 milliGRAM(s) Oral daily  cholecalciferol 1000 Unit(s) Oral at bedtime  escitalopram 15 milliGRAM(s) Oral daily  gabapentin 200 milliGRAM(s) Oral three times a day  losartan 50 milliGRAM(s) Oral daily  mirtazapine 15 milliGRAM(s) Oral at bedtime  pantoprazole    Tablet 40 milliGRAM(s) Oral before breakfast  risperiDONE   Tablet 2 milliGRAM(s) Oral at bedtime  risperiDONE   Tablet 1 milliGRAM(s) Oral daily  sotalol. 40 milliGRAM(s) Oral every 12 hours    MEDICATIONS  (PRN):  polyethylene glycol 3350 17 Gram(s) Oral at bedtime PRN Constipation  senna 2 Tablet(s) Oral at bedtime PRN Constipation    MEDICATIONS  (PRN):  polyethylene glycol 3350 17 Gram(s) Oral at bedtime PRN Constipation  senna 2 Tablet(s) Oral at bedtime PRN Constipation      REVIEW OF SYSTEMS:  ===============================  ===============================    Vital Signs Last 24 Hrs  T(C): 36.4 (26 Aug 2024 09:06), Max: 36.7 (25 Aug 2024 14:47)  T(F): 97.6 (26 Aug 2024 09:06), Max: 98 (25 Aug 2024 14:47)  HR: 78 (26 Aug 2024 11:40) (78 - 86)  BP: 115/58 (26 Aug 2024 11:40) (108/57 - 139/71)  BP(mean): --  RR: 17 (26 Aug 2024 09:06) (17 - 18)  SpO2: 95% (26 Aug 2024 09:06) (95% - 100%)    Parameters below as of 26 Aug 2024 09:06  Patient On (Oxygen Delivery Method): room air        I&O's Summary      I&O's Detail      PHYSICAL EXAM:    Constitutional: NAD, awake and alert,   HEENT: PERR, EOMI,  No oral cyananosis.  Neck:  supple,  No JVD  Respiratory: Breath sounds are clear bilaterally, No wheezing, rales or rhonchi  Cardiovascular: S1 and S2, regular rate and rhythm, no Murmurs, gallops or rubs  Gastrointestinal: Bowel Sounds present, soft, nontender.   Extremities: No peripheral edema. No clubbing or cyanosis.  Vascular: 2+ peripheral pulses  Neurological: A/O x 3, no focal deficits  Musculoskeletal: no calf tenderness.  Skin: No rashes.    ===============================  ===============================  LABS:                         10.4   9.28  )-----------( 345      ( 25 Aug 2024 06:47 )             31.8     26 Aug 2024 07:37    137    |  105    |  15     ----------------------------<  88     4.0     |  26     |  0.60   25 Aug 2024 06:47    138    |  108    |  20     ----------------------------<  87     4.1     |  24     |  0.72     Ca    9.4        26 Aug 2024 07:37  Ca    9.0        25 Aug 2024 06:47    TPro  6.5    /  Alb  3.1    /  TBili  0.3    /  DBili  x      /  AST  54     /  ALT  298    /  AlkPhos  136    25 Aug 2024 06:47    PT/INR - ( 25 Aug 2024 06:47 )   PT: 9.7 sec;   INR: 0.85 ratio         PTT - ( 25 Aug 2024 06:47 )  PTT:28.7 sec    THYROID STUDIES:    ===============================  ===============================  CARDIAC BIOMARKERS:  -------  -BNP VALUES:    -------  -TROPONIN VALUES:   Troponin I, High Sensitivity Result: 4.40 ng/L (24 @ 06:47)  Troponin I, High Sensitivity Result: 4.56 ng/L (24 @ 18:05)  Troponin I, High Sensitivity Result: 6.18 ng/L (24 @ 20:54)  Troponin I, High Sensitivity Result: 5.0 ng/L (24 @ 11:56)  Troponin I, High Sensitivity Result: 4.3 ng/L (24 @ 10:00)  Troponin I, High Sensitivity Result: 4.3 ng/L (24 @ 08:30)  Troponin I, High Sensitivity Result: 3.88 ng/L (23 @ 12:24)  Troponin I, High Sensitivity Result: 28.22 ng/L (21 @ 14:59)  Troponin I, High Sensitivity Result: 10.51 ng/L (21 @ 12:01)      ===============================  ===============================  EKG: NSR no ischemic changes.    Tele - sinus 80s pacs

## 2024-08-26 NOTE — DIETITIAN INITIAL EVALUATION ADULT - ADD RECOMMEND
1) Liberalize diet to regular to maximize caloric and nutrient intake.  2) Add premier protein shake 1x/day (provides 160kcal, 30g protein)  3) Monitor bowel movements, if no BM for >3 days, consider implementing bowel regimen.  4) Encourage protein-rich foods, maximize food preferences  5) MVI w/ minerals daily to ensure 100% RDA met  6) Consider adding thiamine 100 mg daily 2/2 poor PO intake/ malnutrition  7) Monitor lytes/ min and replete prn.  8) Consider adding appetite stimulant such as Remeron or Marinol 2/2 chronically poor appetite/ PO intake/depression   9) Confirm goals of care regarding nutrition support  RD will continue to monitor PO intake, labs, hydration, and wt prn.

## 2024-08-26 NOTE — PHYSICAL THERAPY INITIAL EVALUATION ADULT - NSACTIVITYREC_GEN_A_PT
out of bed to chair daily( ie-meals) amb with 1PA/RW PRN in room ,to bathroom as needed , encourage hydration

## 2024-08-26 NOTE — CONSULT NOTE ADULT - SUBJECTIVE AND OBJECTIVE BOX
HPI:  "74 yr old female hx of chronic A-Fib s/p ablation (not on AC med, prescribed beta blocker but is non-complaint), HLD, HTN, legally blind,  [facility] Psych admit 8/9-22/24 for MDD with psychosis, BIBA from home c/o' tremors all 4 extremities, facial numbness, B/L LE weakness noted upon awakening 5:30 this AM.  Last time in [facility] was 10 PM when went to bed.  911 called.  EMS noted initially slurred speech which self-resolved in transport to ED.  Pt states when got up out of bed, she fell down onto floor because B/L leg weakness w/o head injury nor injury to any body part, though pt stated she was self-ambulatory after that.   mg 2 tabs taken at home PTA, though pt report some difficulty swallowing them & allowed them to dissolve in her mouth.  New Psych meds on DC from . Cat: Wellbutrin, Risperdal,  Dosage changes: Lexapro, Remeron."      Pt seen and examined - presented with aphasia however resolved en route to hospital per ED/EMS. ED provider noted left sided facial droop which has also resolved. Pt has not other deficits and speaking fluently. No h/o CVA. Pt took ASA pta. CTA/CTH: negative for acute cva. LKWT 10pm last night when she went to bed  Per ED provider became transiently hypoxic to 89% and CTA was ordered by ED provider -> negative for PE however radiologist raised concern for possible pna vs atelectasis. Pt denies fevers or sob. Had cough last week, states she was admitted to psych unit at [facility] where she came into contact with other patients with URI. She has no ssx at this time except for a slight cough with clear sputum. She is a former smoker - quit 14 years ago. (8/25/24)    Pt ruled out for CVA via CT/MRI  s/p AF ablation & MDT ILR implanted 2020 now EOS  Pt is resting in the bed, denies chest pain/SOB/palpitations, bur c/o ' feeling off'  Used to have int palpitations but no recurrent AF via ILR as per pt  tele: SR 80-90's bpm  EKG: SR 87 bpm MT 164ms, QRS 78ms, QTC 435ms  Echo-pending      PAST MEDICAL & SURGICAL HISTORY:  Primary osteoarthritis of right hip      Hypertension      Hypercholesterolemia      Arthritis      Muscle cramps      Legally blind  Bilaterally - notes L< 20% vision      Anxiety and depression      Optic nerve drusen, bilateral      Osteoarthritis of left hip      COVID-19 vaccine series completed      History of cardiac radiofrequency ablation  2021      History of atrial fibrillation      Status post placement of implantable loop recorder      H/O cataract  bilateral, 2011      S/P hysterectomy  2012      Status post Mohs surgery  after 2010's      History of right hip replacement  2016      History of breast lump removal  left, age 31          FAMILY HISTORY:  Family history of heart attack (Father)    Family history of colon cancer (Sibling)        SOCIAL HISTORY: former smoker, denies alcohol  Allergies    sulfa drugs (Unknown)    Intolerances    ramipril (Other)    MEDICATIONS  (STANDING):  aspirin enteric coated 81 milliGRAM(s) Oral daily  atorvastatin 80 milliGRAM(s) Oral at bedtime  buPROPion XL (24-Hour) . 300 milliGRAM(s) Oral daily  cholecalciferol 1000 Unit(s) Oral at bedtime  escitalopram 15 milliGRAM(s) Oral daily  gabapentin 200 milliGRAM(s) Oral three times a day  losartan 50 milliGRAM(s) Oral daily  mirtazapine 15 milliGRAM(s) Oral at bedtime  pantoprazole    Tablet 40 milliGRAM(s) Oral before breakfast  risperiDONE   Tablet 2 milliGRAM(s) Oral at bedtime  risperiDONE   Tablet 1 milliGRAM(s) Oral daily  sotalol. 40 milliGRAM(s) Oral every 12 hours    MEDICATIONS  (PRN):  polyethylene glycol 3350 17 Gram(s) Oral at bedtime PRN Constipation  senna 2 Tablet(s) Oral at bedtime PRN Constipation    ROS: All other ROS is negative unless indicated above.    Physical Exam:  Vital Signs Last 24 Hrs  T(C): 36.4 (26 Aug 2024 09:06), Max: 36.7 (25 Aug 2024 14:47)  T(F): 97.6 (26 Aug 2024 09:06), Max: 98 (25 Aug 2024 14:47)  HR: 81 (26 Aug 2024 09:06) (80 - 86)  BP: 108/57 (26 Aug 2024 09:06) (108/57 - 139/71)  RR: 17 (26 Aug 2024 09:06) (17 - 18)  SpO2: 95% (26 Aug 2024 09:06) (95% - 100%)    Parameters below as of 26 Aug 2024 09:06  Patient On (Oxygen Delivery Method): room air      Constitutional: well developed, no deformities and no acute distress    Neurological: Alert & Oriented x 3, DOVER, no focal deficits    HEENT: NC/AT, PERRLA, EOMI,  Neck supple.    Respiratory: CTA B/L, No wheezing/crackles/rhonchi    Cardiovascular: (+) S1 & S2, RRR, No m/r/g    Gastrointestinal: soft, NT, nondistended, (+) BS    Genitourinary: non distended bladder, voiding freely    Extremities: No pedal edema, No clubbing, No cyanosis    Skin:  normal skin color and pigmentation, no skin lesions          LABS:                        10.4   9.28  )-----------( 345      ( 25 Aug 2024 06:47 )             31.8     08-26    137  |  105  |  15  ----------------------------<  88  4.0   |  26  |  0.60    Ca    9.4      26 Aug 2024 07:37    TPro  6.5  /  Alb  3.1<L>  /  TBili  0.3  /  DBili  x   /  AST  54<H>  /  ALT  298<H>  /  AlkPhos  136<H>  08-25    PT/INR - ( 25 Aug 2024 06:47 )   PT: 9.7 sec;   INR: 0.85 ratio         PTT - ( 25 Aug 2024 06:47 )  PTT:28.7 sec   HPI: 74 yr old female hx of P. A-Fib s/p ablation/ILR implanted on 2020 has been on sotalol 40mg BID, OAC d/c'd shortly  after ablation, HLD, HTN, legally blind,  [facility] Psych admit 8/9-22/24 for MDD with psychosis, BIBA from home c/o' tremors all 4 extremities, facial numbness, B/L LE weakness noted upon awakening 5:30 this AM.  Last time in [facility] was 10 PM when went to bed.  911 called.  EMS noted initially slurred speech which self-resolved in transport to ED.  Pt states when got up out of bed, she fell down onto floor because B/L leg weakness w/o head injury nor injury to any body part, though pt stated she was self-ambulatory after that.   mg 2 tabs taken at home PTA, though pt report some difficulty swallowing them & allowed them to dissolve in her mouth.  New Psych meds on DC from [facility]: Wellbutrin, Risperdal,  Dosage changes: Lexapro, Remeron.      Pt seen and examined - presented with aphasia however resolved en route to hospital per ED/EMS. ED provider noted left sided facial droop which has also resolved. Pt has not other deficits and speaking fluently. No h/o CVA. Pt took ASA pta. CTA/CTH: negative for acute cva. LKWT 10pm last night when she went to bed  Per ED provider became transiently hypoxic to 89% and CTA was ordered by ED provider -> negative for PE however radiologist raised concern for possible pna vs atelectasis. Pt denies fevers or sob. Had cough last week, states she was admitted to psych unit at [facility] where she came into contact with other patients with URI. She has no ssx at this time except for a slight cough with clear sputum. She is a former smoker - quit 14 years ago. (8/25/24)    Pt ruled out for CVA via CT/MRI  s/p AF ablation & MDT ILR implanted 2020 now EOS  Pt is resting in the bed, denies chest pain/SOB/palpitations, bur c/o ' feeling off'  Used to have int palpitations but no recurrent AF via ILR as per pt  tele: SR 80-90's bpm  EKG: SR 87 bpm VT 164ms, QRS 78ms, QTC 435ms  Echo-pending      PAST MEDICAL & SURGICAL HISTORY:  Primary osteoarthritis of right hip      Hypertension      Hypercholesterolemia      Arthritis      Muscle cramps      Legally blind  Bilaterally - notes L< 20% vision      Anxiety and depression      Optic nerve drusen, bilateral      Osteoarthritis of left hip      COVID-19 vaccine series completed      History of cardiac radiofrequency ablation  2021      History of atrial fibrillation      Status post placement of implantable loop recorder      H/O cataract  bilateral, 2011      S/P hysterectomy  2012      Status post Mohs surgery  after 2010's      History of right hip replacement  2016      History of breast lump removal  left, age 31          FAMILY HISTORY:  Family history of heart attack (Father)    Family history of colon cancer (Sibling)        SOCIAL HISTORY: former smoker, denies alcohol  Allergies    sulfa drugs (Unknown)    Intolerances    ramipril (Other)    MEDICATIONS  (STANDING):  aspirin enteric coated 81 milliGRAM(s) Oral daily  atorvastatin 80 milliGRAM(s) Oral at bedtime  buPROPion XL (24-Hour) . 300 milliGRAM(s) Oral daily  cholecalciferol 1000 Unit(s) Oral at bedtime  escitalopram 15 milliGRAM(s) Oral daily  gabapentin 200 milliGRAM(s) Oral three times a day  losartan 50 milliGRAM(s) Oral daily  mirtazapine 15 milliGRAM(s) Oral at bedtime  pantoprazole    Tablet 40 milliGRAM(s) Oral before breakfast  risperiDONE   Tablet 2 milliGRAM(s) Oral at bedtime  risperiDONE   Tablet 1 milliGRAM(s) Oral daily  sotalol. 40 milliGRAM(s) Oral every 12 hours    MEDICATIONS  (PRN):  polyethylene glycol 3350 17 Gram(s) Oral at bedtime PRN Constipation  senna 2 Tablet(s) Oral at bedtime PRN Constipation    ROS: All other ROS is negative unless indicated above.    Physical Exam:  Vital Signs Last 24 Hrs  T(C): 36.4 (26 Aug 2024 09:06), Max: 36.7 (25 Aug 2024 14:47)  T(F): 97.6 (26 Aug 2024 09:06), Max: 98 (25 Aug 2024 14:47)  HR: 81 (26 Aug 2024 09:06) (80 - 86)  BP: 108/57 (26 Aug 2024 09:06) (108/57 - 139/71)  RR: 17 (26 Aug 2024 09:06) (17 - 18)  SpO2: 95% (26 Aug 2024 09:06) (95% - 100%)    Parameters below as of 26 Aug 2024 09:06  Patient On (Oxygen Delivery Method): room air      Constitutional: well developed, no deformities and no acute distress    Neurological: Alert & Oriented x 3, DOVER, no focal deficits    HEENT: NC/AT, PERRLA, EOMI,  Neck supple.    Respiratory: CTA B/L, No wheezing/crackles/rhonchi    Cardiovascular: (+) S1 & S2, RRR, No m/r/g    Gastrointestinal: soft, NT, nondistended, (+) BS    Genitourinary: non distended bladder, voiding freely    Extremities: No pedal edema, No clubbing, No cyanosis    Skin:  normal skin color and pigmentation, no skin lesions          LABS:                        10.4   9.28  )-----------( 345      ( 25 Aug 2024 06:47 )             31.8     08-26    137  |  105  |  15  ----------------------------<  88  4.0   |  26  |  0.60    Ca    9.4      26 Aug 2024 07:37    TPro  6.5  /  Alb  3.1<L>  /  TBili  0.3  /  DBili  x   /  AST  54<H>  /  ALT  298<H>  /  AlkPhos  136<H>  08-25    PT/INR - ( 25 Aug 2024 06:47 )   PT: 9.7 sec;   INR: 0.85 ratio         PTT - ( 25 Aug 2024 06:47 )  PTT:28.7 sec

## 2024-08-26 NOTE — DIETITIAN INITIAL EVALUATION ADULT - PERTINENT LABORATORY DATA
08-26    137  |  105  |  15  ----------------------------<  88  4.0   |  26  |  0.60    Ca    9.4      26 Aug 2024 07:37    TPro  6.5  /  Alb  3.1<L>  /  TBili  0.3  /  DBili  x   /  AST  54<H>  /  ALT  298<H>  /  AlkPhos  136<H>  08-25  POCT Blood Glucose.: 124 mg/dL (08-26-24 @ 05:31)  A1C with Estimated Average Glucose Result: 6.0 % (08-26-24 @ 07:37)

## 2024-08-26 NOTE — DIETITIAN INITIAL EVALUATION ADULT - OTHER INFO
74 yr old female hx of chronic A-Fib s/p ablation (not on AC med, prescribed beta blocker but is non-complaint), HLD, HTN, legally blind,  [facility] admit 8/9-22/24 for MDD with psychosis, BIBA from home c/o'ing tremors all 4 extremities, facial numbness, B/L LE weakness noted upon awakening 5:30 this AM.  Last time in [facility] was 10 PM when went to bed.  911 called.  EMS noted initially slurred speech which self-resolved in transport to ED.  Pt states when got up out of bed, she fell down onto floor because B/L leg weakness w/o head injury nor injury to any body part, though pt stated she was self-ambulatory after that.  Admit for ? TIA, facial droop, weakness     MRI negative for acute stroke noted as per neurology on 8/25/24. Reports appetite and PO intake improving, consuming >/= 75% of meals x 1 day. Reports UBW of ~125-132# x 2 mo ago; 112# now, but feels like she has been gaining wt. Bed scale wt of 114# taken by RD on 8/26/24. Unintentional wt loss of 11-18# / 8.8% - 13.6% wt loss x 2 mo; severe & clinically significant. NFPE reveals mild-mod muscle/ fat wasting - appears thin and frail. Liberalize diet to regular to maximize caloric and nutrient intake. Add premier protein shake 1x/day (provides 160kcal, 30g protein) to optimize nutritional needs - pt is receptive. Consider adding appetite stimulant such as Remeron or Marinol 2/2 chronically poor appetite/ PO intake/depression. See below for other recommendations.

## 2024-08-26 NOTE — PHYSICAL THERAPY INITIAL EVALUATION ADULT - GENERAL OBSERVATIONS, REHAB EVAL
Supine recumbent in bed after feeding self lunch meal,ate with good appetite,she is awake,alert ,Ox4, talks rapidly and often ,expresses concern that she has misplaced her DL/credit cards after breakdown at home ,hid them somewhere and she can't remember, thinks they may be in the attic which is accessible by very narrow wooden staircase

## 2024-08-26 NOTE — PHYSICAL THERAPY INITIAL EVALUATION ADULT - PRECAUTIONS/LIMITATIONS, REHAB EVAL
legally blind, multiple psych meds recently started/fall precautions/vision precautions legally blind, ( blind L eye with L visual field cut, states partial vision R eye which is clear) multiple psych meds recently started, h/o vertigo in past and has used Meclizine and Epleys maneuver with relief/fall precautions/vision precautions

## 2024-08-26 NOTE — PHYSICAL THERAPY INITIAL EVALUATION ADULT - LIVES WITH, PROFILE
spouse pt & spouse rent a 2 story apt in [place] past 14 years with 1 flight 13 stairs to reach 2nd floor with wrought iron railing/spouse

## 2024-08-26 NOTE — CONSULT NOTE ADULT - ASSESSMENT
75 yo F with above PMHx presented with prob TIA,  s/p AF ablation/ILR which is EOS  tele: SR  - EKG/tele reviewed by myself  - Pt has been on low dose of sotalol 40 mg BID since ablation also on concomitant psych meds, however - QTC :WNL,   - maintain K+>4.0, Mg++>2.0  - pt can benefit from explant/reimplant of ILR as a longterm telemetry to r/o recurrent AF, will order [facility] upon d/c, will f/u with  as out pt  Plan d/w pt/hospitalist/RN  
Ms. Villatoro is a 74 yr old woman with a history of chronic A-Fib s/p ablation (not on AC med, prescribed beta blocker but is non-complaint), HLD, HTN, legally blind, recent admission to [facility] Psychiatric unit 8/9-22/24 for MDD with psychosis, BIBA from home complaining of tremors in all 4 extremities, facial numbness, B/L LE weakness noted upon awakening at 5:30 this AM.  She also had transient slurred speech and reportedly had a left facial droop which has since resolved.    Weakness:  -had transient generalized weakness which is not suspicious for stroke.  -slurred speech, left facial droop which have resolved. MRI brain is negative for acute stroke. Cannot r/o TIA.  -Continue aspirin 81 mg/day.   -Continue statin  -Monitor on telemetry  -PT  -Will get EEG in AM.   -If remainder of workup is negative, consider addition of Plavix 75 mg/day x 21 days for stroke prevention.  -Consider polypharmacy as a contributing cause to symptoms.     Episode of shaking:  -B/L shaking with no change in consciousness is less likely to be a seizure  -EEG in AM    will f/u as needed

## 2024-08-26 NOTE — PHYSICAL THERAPY INITIAL EVALUATION ADULT - PERTINENT HX OF CURRENT PROBLEM, REHAB EVAL
74 yr old woman with a history of chronic A-Fib s/p ablation (not on AC med, prescribed beta blocker but is non-complaint), HLD, HTN, legally blind, recent admission to [facility] Psychiatric unit 8/9-22/24 for MDD with psychosis, BIBA from home complaining of tremors in all 4 extremities, facial numbness, B/L LE weakness noted upon awakening at 5:30 this AM. Her last known normal was 10 PM on 8/24.  She states that she felt fine until she put her feet on the ground at which point she felt like her legs were noodles. 911 called.  She states that the tremors were a violent shaking of all four extremities without loss of consciousness. EMS noted initially slurred speech which self-resolved in transport to ED.  Pt states when got up out of bed, she fell down onto floor because B/L leg weakness w/o head injury nor injury to any body part, though pt stated she was self-ambulatory after that.   mg 2 tabs taken at home PTA, though pt report some difficulty swallowing them & allowed them to dissolve in her mouth.  After her recent psychiatric admission at [facility], she was started on Wellbutrin, Risperdal. Doses of Lexapro and Remeron were changed.     Her  is at the bedside and states that there ws no inability to speak. She had some mild slurred speech. She is not sure how long the symptoms lasted. Slurred speech had resolved by the time she reached the ED.   She was noted to have a left sided facial droop in the ED.   She was transiently hypoxic to 89% in the ED. CTA was negative for PE but there was concern for PNA vs atelectasis

## 2024-08-26 NOTE — PHYSICAL THERAPY INITIAL EVALUATION ADULT - WORK/LEISURE ACTIVITY, REHAB EVAL
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

## 2024-08-26 NOTE — PHYSICAL THERAPY INITIAL EVALUATION ADULT - ADDITIONAL COMMENTS
pt denies suicidal ideation at this time ,feels better after recent Psych admission at [facility] and expresses remorse for attempted suicide

## 2024-08-26 NOTE — PHYSICAL THERAPY INITIAL EVALUATION ADULT - ASSISTIVE DEVICE FOR TRANSFER: SIT/STAND, REHAB EVAL
pt reports she does not use assistive device at baseline but due to increased bedrest after amb 15ft felt a bit unsteady and was agreeable to use RW

## 2024-08-26 NOTE — DIETITIAN INITIAL EVALUATION ADULT - PERTINENT MEDS FT
MEDICATIONS  (STANDING):  apixaban 5 milliGRAM(s) Oral every 12 hours  aspirin enteric coated 81 milliGRAM(s) Oral daily  atorvastatin 80 milliGRAM(s) Oral at bedtime  buPROPion XL (24-Hour) . 300 milliGRAM(s) Oral daily  cholecalciferol 1000 Unit(s) Oral at bedtime  escitalopram 15 milliGRAM(s) Oral daily  gabapentin 200 milliGRAM(s) Oral three times a day  losartan 50 milliGRAM(s) Oral daily  mirtazapine 15 milliGRAM(s) Oral at bedtime  pantoprazole    Tablet 40 milliGRAM(s) Oral before breakfast  risperiDONE   Tablet 2 milliGRAM(s) Oral at bedtime  risperiDONE   Tablet 1 milliGRAM(s) Oral daily  sotalol. 40 milliGRAM(s) Oral every 12 hours    MEDICATIONS  (PRN):  polyethylene glycol 3350 17 Gram(s) Oral at bedtime PRN Constipation  senna 2 Tablet(s) Oral at bedtime PRN Constipation

## 2024-08-26 NOTE — PHYSICAL THERAPY INITIAL EVALUATION ADULT - GAIT DISTANCE, PT EVAL
100ft x2 with RW and CG/SBGx1 ,seated rest midway in lounge as c/o feeling weak in the legs with subjective tingling in calves ,+hyperverbal & talking excessively [facility]husbands health issues/struggles

## 2024-08-26 NOTE — PHYSICAL THERAPY INITIAL EVALUATION ADULT - MODALITIES TREATMENT COMMENTS
pt offers several vague complaints including intermittent dizziness, tingling B calves with ambulation , irregular breathing ( O2 SAT =WNL) altered depth perception ( although she is legally blind and can only see out of part of R eye )

## 2024-08-26 NOTE — PHYSICAL THERAPY INITIAL EVALUATION ADULT - DIAGNOSIS, PT EVAL
near syncope/collapse associated with high grade tremors BUE/BLES ,r/o TIA vs polypharmacy , h/o Major depressive disorder & psychosis recently hospitalized @ [facility]

## 2024-08-27 ENCOUNTER — TRANSCRIPTION ENCOUNTER (OUTPATIENT)
Age: 74
End: 2024-08-27

## 2024-08-27 VITALS
SYSTOLIC BLOOD PRESSURE: 98 MMHG | HEART RATE: 80 BPM | RESPIRATION RATE: 18 BRPM | OXYGEN SATURATION: 97 % | DIASTOLIC BLOOD PRESSURE: 59 MMHG

## 2024-08-27 DIAGNOSIS — I95.9 HYPOTENSION, UNSPECIFIED: ICD-10-CM

## 2024-08-27 LAB
HCT VFR BLD CALC: 35.1 % — SIGNIFICANT CHANGE UP (ref 34.5–45)
HGB BLD-MCNC: 11.5 G/DL — SIGNIFICANT CHANGE UP (ref 11.5–15.5)
MCHC RBC-ENTMCNC: 30.6 PG — SIGNIFICANT CHANGE UP (ref 27–34)
MCHC RBC-ENTMCNC: 32.8 GM/DL — SIGNIFICANT CHANGE UP (ref 32–36)
MCV RBC AUTO: 93.4 FL — SIGNIFICANT CHANGE UP (ref 80–100)
PLATELET # BLD AUTO: 419 K/UL — HIGH (ref 150–400)
RBC # BLD: 3.76 M/UL — LOW (ref 3.8–5.2)
RBC # FLD: 13.2 % — SIGNIFICANT CHANGE UP (ref 10.3–14.5)
WBC # BLD: 8.17 K/UL — SIGNIFICANT CHANGE UP (ref 3.8–10.5)
WBC # FLD AUTO: 8.17 K/UL — SIGNIFICANT CHANGE UP (ref 3.8–10.5)

## 2024-08-27 PROCEDURE — 99231 SBSQ HOSP IP/OBS SF/LOW 25: CPT

## 2024-08-27 PROCEDURE — 99239 HOSP IP/OBS DSCHRG MGMT >30: CPT

## 2024-08-27 RX ORDER — LOSARTAN POTASSIUM 50 MG/1
25 TABLET ORAL DAILY
Refills: 0 | Status: DISCONTINUED | OUTPATIENT
Start: 2024-08-28 | End: 2024-08-27

## 2024-08-27 RX ORDER — APIXABAN 5 MG/1
1 TABLET, FILM COATED ORAL
Qty: 60 | Refills: 0
Start: 2024-08-27 | End: 2024-09-25

## 2024-08-27 RX ORDER — PANTOPRAZOLE SODIUM 40 MG
1 TABLET, DELAYED RELEASE (ENTERIC COATED) ORAL
Qty: 30 | Refills: 0
Start: 2024-08-27 | End: 2024-09-25

## 2024-08-27 RX ADMIN — APIXABAN 5 MILLIGRAM(S): 5 TABLET, FILM COATED ORAL at 11:20

## 2024-08-27 RX ADMIN — Medication 200 MILLIGRAM(S): at 06:00

## 2024-08-27 RX ADMIN — RISPERIDONE 1 MILLIGRAM(S): 0.25 TABLET, FILM COATED ORAL at 11:22

## 2024-08-27 RX ADMIN — Medication 40 MILLIGRAM(S): at 06:00

## 2024-08-27 RX ADMIN — ESCITALOPRAM OXALATE 15 MILLIGRAM(S): 10 TABLET ORAL at 11:23

## 2024-08-27 RX ADMIN — BUPROPION HYDROCHLORIDE 300 MILLIGRAM(S): 150 TABLET ORAL at 11:23

## 2024-08-27 RX ADMIN — Medication 81 MILLIGRAM(S): at 11:21

## 2024-08-27 RX ADMIN — SOTALOL HYDROCHLORIDE 40 MILLIGRAM(S): 120 TABLET ORAL at 11:23

## 2024-08-27 RX ADMIN — LOSARTAN POTASSIUM 50 MILLIGRAM(S): 50 TABLET ORAL at 11:21

## 2024-08-27 NOTE — PROGRESS NOTE ADULT - NUTRITIONAL ASSESSMENT
This patient has been assessed with a concern for Malnutrition and has been determined to have a diagnosis/diagnoses of Severe protein-calorie malnutrition.    This patient is being managed with:   Diet DASH/TLC-  Sodium & Cholesterol Restricted  Entered: Aug 25 2024 10:06AM  
This patient has been assessed with a concern for Malnutrition and has been determined to have a diagnosis/diagnoses of Severe protein-calorie malnutrition.    This patient is being managed with:   Diet DASH/TLC-  Sodium & Cholesterol Restricted  Entered: Aug 25 2024 10:06AM

## 2024-08-27 NOTE — PROGRESS NOTE ADULT - SUBJECTIVE AND OBJECTIVE BOX
Interval History:  8/26/24: States she still feels some tingling in her legs and feels as if she is moving when she is not. Also reports changes with depth perception.     MEDICATIONS  (STANDING):  apixaban 5 milliGRAM(s) Oral every 12 hours  aspirin enteric coated 81 milliGRAM(s) Oral daily  atorvastatin 80 milliGRAM(s) Oral at bedtime  buPROPion XL (24-Hour) . 300 milliGRAM(s) Oral daily  cholecalciferol 1000 Unit(s) Oral at bedtime  escitalopram 15 milliGRAM(s) Oral daily  gabapentin 200 milliGRAM(s) Oral three times a day  losartan 50 milliGRAM(s) Oral daily  mirtazapine 15 milliGRAM(s) Oral at bedtime  pantoprazole    Tablet 40 milliGRAM(s) Oral before breakfast  risperiDONE   Tablet 2 milliGRAM(s) Oral at bedtime  risperiDONE   Tablet 1 milliGRAM(s) Oral daily  sotalol. 40 milliGRAM(s) Oral every 12 hours    MEDICATIONS  (PRN):  polyethylene glycol 3350 17 Gram(s) Oral at bedtime PRN Constipation  senna 2 Tablet(s) Oral at bedtime PRN Constipation      Allergies    sulfa drugs (Unknown)    Intolerances    ramipril (Other)      PHYSICAL EXAM:  Vital Signs Last 24 Hrs  T(F): 97.6 (08-26-24 @ 09:06)  HR: 78 (08-26-24 @ 11:40)  BP: 115/58 (08-26-24 @ 11:40)  RR: 17 (08-26-24 @ 09:06)    GENERAL: NAD, well-groomed  HEAD:  Atraumatic, Normocephalic  Neuro:  Awake, alert, no aphasia  CN: PERRL, EOMI, no nystagmus, no facial weakness, tongue protrudes in the midline  motor: normal tone, no pronator drift, full strength in all four extremities  sensory: intact to light touch  coordination: finger to nose intact bilaterally  DTRs: symmetric, plantar responses flexor bilaterally    LABS:                        10.4   9.28  )-----------( 345      ( 25 Aug 2024 06:47 )             31.8     08-26    137  |  105  |  15  ----------------------------<  88  4.0   |  26  |  0.60    Ca    9.4      26 Aug 2024 07:37    TPro  6.5  /  Alb  3.1<L>  /  TBili  0.3  /  DBili  x   /  AST  54<H>  /  ALT  298<H>  /  AlkPhos  136<H>  08-25    PT/INR - ( 25 Aug 2024 06:47 )   PT: 9.7 sec;   INR: 0.85 ratio         PTT - ( 25 Aug 2024 06:47 )  PTT:28.7 sec  Urinalysis Basic - ( 26 Aug 2024 07:37 )    Color: x / Appearance: x / SG: x / pH: x  Gluc: 88 mg/dL / Ketone: x  / Bili: x / Urobili: x   Blood: x / Protein: x / Nitrite: x   Leuk Esterase: x / RBC: x / WBC x   Sq Epi: x / Non Sq Epi: x / Bacteria: x        RADIOLOGY & ADDITIONAL STUDIES:      CT brain 8/25/24:  No evidence of acute intracranial hemorrhage, midline shift or CT   evidence of acute territorial infarct.    Please note that the cerebellum was not completely included on the   noncontrast study limiting evaluation. If the patient's symptoms persist,   consider short interval follow-up head CT or brain MRI if there are no   MRI contraindications.      CTA head and neck, CT perfusion 8/25/24:  CT perfusion:  No perfusion abnormality.    CT angiography neck: No hemodynamically significant stenosis of the   bilateral cervical ICAs using NASCET criteria.  Patent vertebral   arteries.  No evidence of vascular dissection.    CT angiography brain: No large vessel occlusion. No evidence of aneurysm.    MR head 8/25/24:  No evidence for intracranial mass, acute territorial infarct, acute   intracranial hemorrhage, or midline shift.    TTE 8/26/24:   1. Normal right ventricular cavity size.   2. Mild mitral regurgitation.   3. Trace to mild tricuspid regurgitation.   4. Interatrial septum is aneurysmal.   5. There is focal calcification of the aortic valve leaflets. No aortic valve stenosis.   6. There is mild calcification of the mitral valve annulus.        
Interval History:  8/27/24: She is feeling better today.    MEDICATIONS  (STANDING):  apixaban 5 milliGRAM(s) Oral every 12 hours  aspirin enteric coated 81 milliGRAM(s) Oral daily  atorvastatin 80 milliGRAM(s) Oral at bedtime  buPROPion XL (24-Hour) . 300 milliGRAM(s) Oral daily  cholecalciferol 1000 Unit(s) Oral at bedtime  escitalopram 15 milliGRAM(s) Oral daily  gabapentin 200 milliGRAM(s) Oral three times a day  mirtazapine 15 milliGRAM(s) Oral at bedtime  pantoprazole    Tablet 40 milliGRAM(s) Oral before breakfast  risperiDONE   Tablet 2 milliGRAM(s) Oral at bedtime  risperiDONE   Tablet 1 milliGRAM(s) Oral daily  sotalol. 40 milliGRAM(s) Oral every 12 hours    MEDICATIONS  (PRN):  polyethylene glycol 3350 17 Gram(s) Oral at bedtime PRN Constipation  senna 2 Tablet(s) Oral at bedtime PRN Constipation      Allergies    sulfa drugs (Unknown)    Intolerances    ramipril (Other)      PHYSICAL EXAM:  Vital Signs Last 24 Hrs  T(F): 97.3 (08-27-24 @ 08:45)  HR: 92 (08-27-24 @ 08:45)  BP: 110/57 (08-27-24 @ 08:45)  RR: 18 (08-27-24 @ 08:45)    GENERAL: NAD, well-groomed, well-developed  HEAD:  Atraumatic, Normocephalic  Neuro:  Awake, alert, no aphasia  CN: PERRL, EOMI, no nystagmus, no facial weakness, tongue protrudes in the midline  motor: normal tone, no pronator drift, full strength in all four extremities  sensory: intact to light touch  coordination: finger to nose intact bilaterally  DTRs: symmetric    LABS:                        11.5   8.17  )-----------( 419      ( 27 Aug 2024 08:37 )             35.1     08-26    137  |  105  |  15  ----------------------------<  88  4.0   |  26  |  0.60    Ca    9.4      26 Aug 2024 07:37        Urinalysis Basic - ( 26 Aug 2024 07:37 )    Color: x / Appearance: x / SG: x / pH: x  Gluc: 88 mg/dL / Ketone: x  / Bili: x / Urobili: x   Blood: x / Protein: x / Nitrite: x   Leuk Esterase: x / RBC: x / WBC x   Sq Epi: x / Non Sq Epi: x / Bacteria: x        RADIOLOGY & ADDITIONAL STUDIES:      CT brain 8/25/24:  No evidence of acute intracranial hemorrhage, midline shift or CT   evidence of acute territorial infarct.    Please note that the cerebellum was not completely included on the   noncontrast study limiting evaluation. If the patient's symptoms persist,   consider short interval follow-up head CT or brain MRI if there are no   MRI contraindications.      CTA head and neck, CT perfusion 8/25/24:  CT perfusion:  No perfusion abnormality.    CT angiography neck: No hemodynamically significant stenosis of the   bilateral cervical ICAs using NASCET criteria.  Patent vertebral   arteries.  No evidence of vascular dissection.    CT angiography brain: No large vessel occlusion. No evidence of aneurysm.    MR head 8/25/24:  No evidence for intracranial mass, acute territorial infarct, acute   intracranial hemorrhage, or midline shift.    TTE 8/26/24:   1. Normal right ventricular cavity size.   2. Mild mitral regurgitation.   3. Trace to mild tricuspid regurgitation.   4. Interatrial septum is aneurysmal.   5. There is focal calcification of the aortic valve leaflets. No aortic valve stenosis.   6. There is mild calcification of the mitral valve annulus.          
: slurring of speech resolved  no weakness in arms /legs      PHYSICAL EXAM:    Daily     Daily Weight in k.1 (25 Aug 2024 15:55)    Vital Signs Last 24 Hrs  T(C): 36.4 (26 Aug 2024 09:06), Max: 36.4 (25 Aug 2024 15:55)  T(F): 97.6 (26 Aug 2024 09:06), Max: 97.6 (26 Aug 2024 09:06)  HR: 78 (26 Aug 2024 11:40) (78 - 86)  BP: 115/58 (26 Aug 2024 11:40) (108/57 - 123/67)  BP(mean): --  RR: 17 (26 Aug 2024 09:06) (17 - 18)  SpO2: 95% (26 Aug 2024 09:06) (95% - 98%)    Constitutional: Weak  appearing  HEENT: Atraumatic, PRANAV, legally florecita  Respiratory: Breath Sounds normal, no rhonchi/wheeze  Cardiovascular: N S1S2;   Gastrointestinal: Abdomen soft, non tender, Bowel Sounds present  Extremities: No edema, peripheral pulses present  Neurological: AAO x 3, no gross focal motor deficits  Skin: Non cellulitic, no rash, ulcers  Lymph Nodes: No lymphadenopathy noted  Back: No CVA tenderness   Musculoskeletal: non tender  Breasts: Deferred  Genitourinary: deferred  Rectal: Deferred    All Labs/EKG/Radiology/Meds reviewed by me                          10.4   9.28  )-----------( 345      ( 25 Aug 2024 06:47 )             31.8       CBC Full  -  ( 25 Aug 2024 06:47 )  WBC Count : 9.28 K/uL  RBC Count : 3.43 M/uL  Hemoglobin : 10.4 g/dL  Hematocrit : 31.8 %  Platelet Count - Automated : 345 K/uL  Mean Cell Volume : 92.7 fl  Mean Cell Hemoglobin : 30.3 pg  Mean Cell Hemoglobin Concentration : 32.7 gm/dL  Auto Neutrophil # : 6.05 K/uL  Auto Lymphocyte # : 1.97 K/uL  Auto Monocyte # : 0.78 K/uL  Auto Eosinophil # : 0.38 K/uL  Auto Basophil # : 0.05 K/uL  Auto Neutrophil % : 65.3 %  Auto Lymphocyte % : 21.2 %  Auto Monocyte % : 8.4 %  Auto Eosinophil % : 4.1 %  Auto Basophil % : 0.5 %          137  |  105  |  15  ----------------------------<  88  4.0   |  26  |  0.60    Ca    9.4      26 Aug 2024 07:37    TPro  6.5  /  Alb  3.1<L>  /  TBili  0.3  /  DBili  x   /  AST  54<H>  /  ALT  298<H>  /  AlkPhos  136<H>        LIVER FUNCTIONS - ( 25 Aug 2024 06:47 )  Alb: 3.1 g/dL / Pro: 6.5 gm/dL / ALK PHOS: 136 U/L / ALT: 298 U/L / AST: 54 U/L / GGT: x             PT/INR - ( 25 Aug 2024 06:47 )   PT: 9.7 sec;   INR: 0.85 ratio         PTT - ( 25 Aug 2024 06:47 )  PTT:28.7 sec          Urinalysis Basic - ( 26 Aug 2024 07:37 )    Color: x / Appearance: x / SG: x / pH: x  Gluc: 88 mg/dL / Ketone: x  / Bili: x / Urobili: x   Blood: x / Protein: x / Nitrite: x   Leuk Esterase: x / RBC: x / WBC x   Sq Epi: x / Non Sq Epi: x / Bacteria: x        < from: MR Head No Cont (24 @ 14:21) >  IMPRESSION:    No evidence for intracranial mass, acute territorial infarct, acute   intracranial hemorrhage, or midline shift.    < end of copied text >  < from: CT Angio Chest PE Protocol w/ IV Cont (24 @ 08:49) >  IMPRESSION:    No pulmonary embolus.    New bibasilar opacities at the lower lobes and right middle lobe and   scattered clustered nodular opacities may reflect infectious   etiology/pneumonia in the appropriate clinical setting. Bilateral   bronchial thickening. Follow to resolution with repeat chest CT in one   month, or sooner as clinically warranted.    Emphysema. Possible interlobular septal thickening, particularly at the   right upper lobe. Component of pulmonary congestion is not excluded.    Multivessel coronary artery calcifications. Recommend cardiology   evaluation.    < end of copied text >  < from: CT Angio Neck Stroke Protocol w/ IV Cont (24 @ 07:05) >  IMPRESSION:    CT perfusion:  No perfusion abnormality.    CT angiography neck: No hemodynamically significant stenosis of the   bilateral cervical ICAs using NASCET criteria.  Patent vertebral   arteries.  No evidence of vascular dissection.    CT angiography brain: No large vessel occlusion. No evidence of aneurysm.    < end of copied text >      MEDICATIONS  (STANDING):  apixaban 5 milliGRAM(s) Oral every 12 hours  aspirin enteric coated 81 milliGRAM(s) Oral daily  atorvastatin 80 milliGRAM(s) Oral at bedtime  buPROPion XL (24-Hour) . 300 milliGRAM(s) Oral daily  cholecalciferol 1000 Unit(s) Oral at bedtime  escitalopram 15 milliGRAM(s) Oral daily  gabapentin 200 milliGRAM(s) Oral three times a day  losartan 50 milliGRAM(s) Oral daily  mirtazapine 15 milliGRAM(s) Oral at bedtime  pantoprazole    Tablet 40 milliGRAM(s) Oral before breakfast  risperiDONE   Tablet 2 milliGRAM(s) Oral at bedtime  risperiDONE   Tablet 1 milliGRAM(s) Oral daily  sotalol. 40 milliGRAM(s) Oral every 12 hours    MEDICATIONS  (PRN):  polyethylene glycol 3350 17 Gram(s) Oral at bedtime PRN Constipation  senna 2 Tablet(s) Oral at bedtime PRN Constipation    
CHIEF COMPLAINT:    HPI:  "74 yr old female hx of chronic A-Fib s/p ablation (not on AC med, prescribed beta blocker but is non-complaint), HLD, HTN, legally blind,  [facility] Psych admit - for MDD with psychosis, BIBA from home c/o'ing tremors all 4 extremities, facial numbness, B/L LE weakness noted upon awakening 5:30 this AM.  Last time in [facility] was 10 PM when went to bed.  911 called.  EMS noted initially slurred speech which self-resolved in transport to ED.  Pt states when got up out of bed, she fell down onto floor because B/L leg weakness w/o head injury nor injury to any body part, though pt stated she was self-ambulatory after that.   mg 2 tabs taken at home PTA, though pt report some difficulty swallowing them & allowed them to dissolve in her mouth.  New Psych meds on DC from [facility]: Wellbutrin, Resperdal,  Dosage changes: Lexapro, Remeron."    Pt seen and examined - presented with aphasia however resolved en route to hospital per ED/EMS. ED provider noted left sided facial droop which has also resolved. Pt has not other deficits and speaking fluently. No h/o CVA. Pt took ASA pta. CTA/CTH: negative for acute cva. LKWT 10pm last night when she went to bed  Per E Dprovider became transiently hypoxic to 89% and CTA was ordered by ED provider -> negative for PE however radiologist raised concern for possible pna vs atelectasis. Pt denies fevers or sob. Had cough last week, states she was admitted to psych unit at [facility] where she came into contact with other patients with URI. She has no ssx at this time except for a slight cough with clear sputum. She is a former smoker - quit 14 years ago.     Admitted for TIA.  Also w/ h/o afib s/p ablation, s/p ILR 4 yrs ago.  Review symptoms w/ pt - bilateral leg weakness no h/o palpitations, dyspnea, chest pain.  Interrogation of ILR this AM shows battery was dead for past few months.  Pt had afib ablation w/ Dr. jasiel glez then changed to Dr. snow.  Dr. Taylor planned to change ILR but pt had refused in the past.    24 - seen in bed, no cardiac complaints, Tele: NSR 90s     MEDICATIONS:  OUTPATIENT:  Home Medications:  aspirin 81 mg oral delayed release tablet: 1 tab(s) orally once a day (25 Aug 2024 13:24)  buPROPion 300 mg/24 hours (XL) oral tablet, extended release: 1 tab(s) orally once a day (25 Aug 2024 12:11)  escitalopram 5 mg oral tablet: 3 tab(s) orally once a day (25 Aug 2024 12:11)  gabapentin 100 mg oral capsule: 2 cap(s) orally 3 times a day (25 Aug 2024 12:14)  losartan 50 mg oral tablet: 1 tab(s) orally once a day (at bedtime) (25 Aug 2024 13:21)  mirtazapine 15 mg oral tablet: 1 tab(s) orally once a day (at bedtime) (25 Aug 2024 13:24)  polyethylene glycol 3350 oral powder for reconstitution: 17 gram(s) orally once a day (at bedtime), As Needed (25 Aug 2024 13:21)  risperiDONE 1 mg oral tablet, disintegratin tab(s) orally once a day at 9am (25 Aug 2024 13:24)  risperiDONE 2 mg oral tablet, disintegratin tab(s) orally once a day at 9pm (25 Aug 2024 13:24)  senna oral tablet: 2 tab(s) orally once a day (at bedtime), As Needed (25 Aug 2024 13:25)  sotalol 80 mg oral tablet: 0.5 tab(s) orally 2 times a day (25 Aug 2024 12:12)  traZODone 50 mg oral tablet: 1 tab(s) orally once a day (at bedtime) as needed for  sleep (25 Aug 2024 12:21)  Vitamin D3 1000 intl units oral capsule: 1 cap(s) orally once a day (at bedtime) (25 Aug 2024 13:25)      INPATIENT:  MEDICATIONS  (STANDING):  apixaban 5 milliGRAM(s) Oral every 12 hours  aspirin enteric coated 81 milliGRAM(s) Oral daily  atorvastatin 80 milliGRAM(s) Oral at bedtime  buPROPion XL (24-Hour) . 300 milliGRAM(s) Oral daily  cholecalciferol 1000 Unit(s) Oral at bedtime  escitalopram 15 milliGRAM(s) Oral daily  gabapentin 200 milliGRAM(s) Oral three times a day  losartan 25 milliGRAM(s) Oral daily  mirtazapine 15 milliGRAM(s) Oral at bedtime  pantoprazole    Tablet 40 milliGRAM(s) Oral before breakfast  risperiDONE   Tablet 2 milliGRAM(s) Oral at bedtime  risperiDONE   Tablet 1 milliGRAM(s) Oral daily  sotalol. 40 milliGRAM(s) Oral every 12 hours    MEDICATIONS  (PRN):  polyethylene glycol 3350 17 Gram(s) Oral at bedtime PRN Constipation  senna 2 Tablet(s) Oral at bedtime PRN Constipation    Vital Signs Last 24 Hrs  T(C): 36.3 (27 Aug 2024 08:45), Max: 37.3 (26 Aug 2024 21:34)  T(F): 97.3 (27 Aug 2024 08:45), Max: 99.1 (26 Aug 2024 21:34)  HR: 92 (27 Aug 2024 08:45) (84 - 98)  BP: 110/57 (27 Aug 2024 08:45) (95/51 - 110/57)  BP(mean): --  RR: 18 (27 Aug 2024 08:45) (18 - 18)  SpO2: 96% (27 Aug 2024 08:45) (96% - 97%)    Parameters below as of 27 Aug 2024 08:45  Patient On (Oxygen Delivery Method): room air      PHYSICAL EXAM:    Constitutional: NAD, awake and alert,   HEENT: PERR, EOMI,  No oral cyananosis.  Neck:  supple,  No JVD  Respiratory: Breath sounds are clear bilaterally, No wheezing, rales or rhonchi  Cardiovascular: S1 and S2, regular rate and rhythm, no Murmurs, gallops or rubs  Gastrointestinal: Bowel Sounds present, soft, nontender.   Extremities: No peripheral edema. No clubbing or cyanosis.  Vascular: 2+ peripheral pulses  Neurological: A/O x 3, no focal deficits  Musculoskeletal: no calf tenderness.  Skin: No rashes.    ===============================  ===============================  LABS: reviewed                           11.5   8.17  )-----------( 419      ( 27 Aug 2024 08:37 )             35.1         137  |  105  |  15  ----------------------------<  88  4.0   |  26  |  0.60    Ca    9.4      26 Aug 2024 07:37      - TroponinI hsT: <-4.40        ===============================  ===============================    Radiology/EKG/TTE: reviewed     < from: TTE W or WO Ultrasound Enhancing Agent (24 @ 09:01) >      1. Normal right ventricular cavity size.   2. Mild mitral regurgitation.   3. Trace to mild tricuspid regurgitation.   4. Interatrial septum is aneurysmal.   5. There is focal calcification of the aortic valve leaflets. No aortic valve stenosis.   6. There is mild calcification of the mitral valve annulus.    < end of copied text >    EKG: NSR no ischemic changes.    Tele - sinus 80s pacs

## 2024-08-27 NOTE — PROGRESS NOTE ADULT - ASSESSMENT
74 yr old female hx of A-Fib s/p ablation (not on AC med, prescribed beta blocker but is non-complaint), HLD, HTN, legally blind,  [facility] Psych admit - for MDD with psychosis, BIBA from home c/o'ing tremors all 4 extremities, facial numbness, B/L LE weakness noted upon awakening 5:30 this AM.  Last time in [facility] was 10 PM when went to bed.  911 called.  EMS noted initially slurred speech which self-resolved in transport to ED.  Pt states when got up out of bed, she fell down onto floor because B/L leg weakness w/o head injury nor injury to any body part, though pt stated she was self-ambulatory after that.   mg 2 tabs taken at home PTA, though pt report some difficulty swallowing them & allowed them to dissolve in her mouth.  New Psych meds on DC from . [facility]: Wellbutrin, Resperdal,  Dosage changes: Lexapro, Remeron.    pt admitted with :     #TIA: no acute cva  - admit to tele  - neuro checks q4h  - CTH/A: results noted above  - bedside dysphagia screen passed  - TTE  - lipid panle/A1C  - ASA/statin  - MRI neg  - neuro consult  - PT consult  start Eliquis      #Abnl CT chest  - had a transient e/o of hypoxia to 89%  - corrected spontaneously  - pt adamantly denies ssx  - former smoker, no prior PFTs  - emphysematous changes noted  - afebrile, no leukocytosis  - will monitor off abx for now  - will need pulm referral and repeat CT in one month, pt made aware  - pt has upcoming appt with Dr Acosta, she has no anginal equivalents, CT demonstrated coronary artery calcification-> o/p ischemic work up      #H/o afib  - not on a/c  - continue asa  - cont home dose sotalol, check ekg for QTc  start Eliquis   Pt has ILR placed 4 yrs ago but it  6 months ago  EP consult appreciated  would be placed on [facility]OT as a bridge to new ILR.   cardio consult appreciated    #MDD  - denies any SI/HI  - resume all home psych meds   - meds reviewed from dc summary prepared from her most recent discharge    DVT PPX: on Eliquis    POC discussed with pt, team, Dr. Hwang, EP NP.   
Ms. Villatoro is a 74 yr old woman with a history of chronic A-Fib s/p ablation (not on AC med, prescribed beta blocker but is non-complaint), HLD, HTN, legally blind, recent admission to [facility] unit 8/9-22/24 for MDD with psychosis, BIBA from home on 8/25/24 complaining of tremors in all 4 extremities, facial numbness, B/L LE weakness noted upon awakening at 5:30 AM.  She also had transient slurred speech and reportedly had a left facial droop which has since resolved.    Weakness:  -had transient generalized weakness which is not suspicious for stroke.  -slurred speech, left facial droop which have resolved. MRI brain is negative for acute stroke. Cannot r/o TIA.  -Continue aspirin 81 mg/day.   -Continue statin  -Restarted on AC, Eliquis  -Monitor on telemetry while here, MCOT at the time of discharge  -EEG is normal.    f/u with psychiatry for medication management    Episode of shaking:  -B/L shaking with no change in consciousness is less likely to be a seizure  -EEG normal  -Do not recommend anti-seizure medications at this time.     Please call back if additional input is needed from neurology service. 
Ms. Villatoro is a 74 yr old woman with a history of chronic A-Fib s/p ablation (not on AC med, prescribed beta blocker but is non-complaint), HLD, HTN, legally blind, recent admission to [facility] Psychiatric unit 8/9-22/24 for MDD with psychosis, BIBA from home on 8/25/24 complaining of tremors in all 4 extremities, facial numbness, B/L LE weakness noted upon awakening at 5:30 AM.  She also had transient slurred speech and reportedly had a left facial droop which has since resolved.    Weakness:  -had transient generalized weakness which is not suspicious for stroke.  -slurred speech, left facial droop which have resolved. MRI brain is negative for acute stroke. Cannot r/o TIA.  -Continue aspirin 81 mg/day.   -Continue statin  -To be started on AC for history of afib.   -Monitor on telemetry  -EEG is normal.    Feeling of movement, change in depth perception:  -Possibly vestibular dysfunction vs polypharmacy.  -She need psych f/u. she was recently started on several new medications and may be having adverse effects    Episode of shaking:  -B/L shaking with no change in consciousness is less likely to be a seizure  -EEG normal  -Do not recommend anti-seizure medications at this time.     will f/u if additional input is needed from neurology service.

## 2024-08-27 NOTE — DISCHARGE NOTE NURSING/CASE MANAGEMENT/SOCIAL WORK - NSDCPEPTCAREGIVEDUMATLIST _GEN_ALL_CORE
Training done with Amarilys and mother today.  Patient states she slept well last night, has general pain but taking Tylenol and feels good.  Dressing had noted dry blood and was peeling up.  Removed dressing, cleansed exit site, placed Flexitrak for stability and secured with dressing.  Amarilys instructed to only wear loose fitting clothing (sweat pants, scrub pants, loose leggings) to reduce pressure on the exit site and prevent tugging.  No drain when catheter accessed.  Filled with 176ml and stopped due to complaint of pain.  Warm packs given and repositioned.  Allowed to rest before draining, drained 102ml and stopped due to discomfort.  Patient has not stooled in two days and states she feels like she needs to use the restroom but isn't ready to do so.  Importance of stooling daily and avoidance of constipation has been reviewed during training.  Patient does have a history of constipation and has MiraLax at home for PRN use.  Mother plans to ensure snacks and dinner are higher fiber ingredients for the next few days and monitor.  Plastic adapter removed, titanium adapter placed with new transfer set per policy.  Training to continue on Friday.      Stroke Stroke/Apixaban/Eliquis

## 2024-08-27 NOTE — DISCHARGE NOTE PROVIDER - CARE PROVIDER_API CALL
Joe Corbin  Internal Medicine  [facility] [facility]  [place] [address]  Phone: [phone]  Fax: [phone]  Follow Up Time: 1-3 days    Kalen Echols  Cardiac Electrophysiology  [address] [place] [id number]  Phone: [phone]  Fax: [phone]  Follow Up Time: 1 week

## 2024-08-27 NOTE — DISCHARGE NOTE NURSING/CASE MANAGEMENT/SOCIAL WORK - PATIENT PORTAL LINK FT
You can access the FollowMyHealth Patient Portal offered by [facility] by registering at the following website: [email or web address]. By joining [facility]’s FollowMyHealth portal, you will also be able to view your health information using other applications (apps) compatible with our system.

## 2024-08-27 NOTE — PROGRESS NOTE ADULT - PROBLEM SELECTOR PLAN 1
·  Problem: Atrial fibrillation.   ·  Recommendation: -h/o afib s/p ablation 4 yrs ago. currently NSR  -CHADS2-VASc=5 (>75, HTN, TIA)  -pt describes plan for holding anticoag postablation. w/ ILR monitoring.  -unfortunately ILR battery dead - no arrhythmia information in past few months  prior to recent event.    -At this time given suspicion for CVA - AC with eliquis restarted   -Agree w/ EP consult - they plan for MCOT w/ bridge to ILR implant as OP  (cannot do ILR implant now - no EPs currently available).    pt. is stable from cardiac standpoint, will followup with cardiology - Dr. Hwang and EP Dr. Arellano, will sign off

## 2024-08-27 NOTE — DISCHARGE NOTE PROVIDER - HOSPITAL COURSE
PHYSICAL EXAM:    Daily     Daily     Vital Signs Last 24 Hrs  T(C): 36.3 (27 Aug 2024 08:45), Max: 37.3 (26 Aug 2024 21:34)  T(F): 97.3 (27 Aug 2024 08:45), Max: 99.1 (26 Aug 2024 21:34)  HR: 92 (27 Aug 2024 08:45) (84 - 98)  BP: 110/57 (27 Aug 2024 08:45) (95/51 - 110/57)  BP(mean): --  RR: 18 (27 Aug 2024 08:45) (18 - 18)  SpO2: 96% (27 Aug 2024 08:45) (96% - 97%)    Constitutional: Well  appearing  HEENT: Atraumatic, PRANAV, Normal, No congestion  Respiratory: Breath Sounds normal, no rhonchi/wheeze  Cardiovascular: N S1S2;   Gastrointestinal: Abdomen soft, non tender, Bowel Sounds present  Extremities: No edema, peripheral pulses present  Neurological: AAO x 3, no gross focal motor deficits  Skin: Non cellulitic, no rash, ulcers  Lymph Nodes: No lymphadenopathy noted  Back: No CVA tenderness   Musculoskeletal: non tender  Breasts: Deferred  Genitourinary: deferred  Rectal: Deferred      74 yr old female hx of A-Fib s/p ablation (not on AC med, prescribed beta blocker but is non-complaint), HLD, HTN, legally blind,  [facility] Psych admit - for MDD with psychosis, BIBA from home c/o'ing tremors all 4 extremities, facial numbness, B/L LE weakness noted upon awakening 5:30 this AM.  Last time in [facility] was 10 PM when went to bed.  911 called.  EMS noted initially slurred speech which self-resolved in transport to ED.  Pt states when got up out of bed, she fell down onto floor because B/L leg weakness w/o head injury nor injury to any body part, though pt stated she was self-ambulatory after that.   mg 2 tabs taken at home PTA, though pt report some difficulty swallowing them & allowed them to dissolve in her mouth.  New Psych meds on DC from . Cat: Wellbutrin, Resperdal,  Dosage changes: Lexapro, Remeron.    pt admitted with :     #TIA: no acute cva  - admit to tele  - neuro checks q4h  - CTH/A: results noted above  - bedside dysphagia screen passed  - TTE; normal  - lipid panle/A1C  - ASA/statin  - MRI neg  - neuro consult  - PT consult  orthostatics neg  start Eliquis      #Abnl CT chest  - had a transient e/o of hypoxia to 89%  - corrected spontaneously  - pt adamantly denies ssx  - former smoker, no prior PFTs  - emphysematous changes noted  - afebrile, no leukocytosis  - will monitor off abx for now  - will need pulm referral and repeat CT in one month, pt made aware  - pt has upcoming appt with Dr Jewell, she has no anginal equivalents, CT demonstrated coronary artery calcification-> o/p ischemic work up      #H/o afib  - not on a/c  - continue asa  - cont home dose sotalol, check ekg for QTc  start Eliquis   Pt has ILR placed 4 yrs ago but it  6 months ago  EP consult appreciated  would be placed on MCOT as a bridge to new ILR.   cardio consult appreciated    #MDD  - denies any SI/HI  - resume all home psych meds   - meds reviewed from dc summary prepared from her most recent discharge    d/c home    time spent 45 min

## 2024-08-27 NOTE — DISCHARGE NOTE PROVIDER - NSDCFUSCHEDAPPT_GEN_ALL_CORE_FT
Mag Mark  [facility] Physician Counts include 234 beds at the [facility]  CARDIOLOGY [address]  Scheduled Appointment: 09/04/2024    Kalen Echols  [facility]  ELECTROPH [address]  Scheduled Appointment: 10/07/2024

## 2024-08-27 NOTE — DISCHARGE NOTE PROVIDER - NSDCMRMEDTOKEN_GEN_ALL_CORE_FT
apixaban 5 mg oral tablet: 1 tab(s) orally every 12 hours  aspirin 81 mg oral delayed release tablet: 1 tab(s) orally once a day  atorvastatin 80 mg oral tablet: 1 tab(s) orally once a day (at bedtime)  buPROPion 300 mg/24 hours (XL) oral tablet, extended release: 1 tab(s) orally once a day  escitalopram 5 mg oral tablet: 3 tab(s) orally once a day  gabapentin 100 mg oral capsule: 2 cap(s) orally 3 times a day  losartan 50 mg oral tablet: 1 tab(s) orally once a day (at bedtime)  mirtazapine 15 mg oral tablet: 1 tab(s) orally once a day (at bedtime)  pantoprazole 40 mg oral delayed release tablet: 1 tab(s) orally once a day (before a meal)  polyethylene glycol 3350 oral powder for reconstitution: 17 gram(s) orally once a day (at bedtime), As Needed  risperiDONE 1 mg oral tablet, disintegratin tab(s) orally once a day at 9am  risperiDONE 2 mg oral tablet, disintegratin tab(s) orally once a day at 9pm  senna oral tablet: 2 tab(s) orally once a day (at bedtime), As Needed  sotalol 80 mg oral tablet: 0.5 tab(s) orally 2 times a day  traZODone 50 mg oral tablet: 1 tab(s) orally once a day (at bedtime) as needed for  sleep  Vitamin D3 1000 intl units oral capsule: 1 cap(s) orally once a day (at bedtime)

## 2024-08-27 NOTE — DISCHARGE NOTE PROVIDER - NSDCCPCAREPLAN_GEN_ALL_CORE_FT
PRINCIPAL DISCHARGE DIAGNOSIS  Diagnosis: TIA (transient ischemic attack)  Assessment and Plan of Treatment: hx of A fib  start elinabil mcgowano to discharge  new ILR with Dr. Echols      SECONDARY DISCHARGE DIAGNOSES  Diagnosis: Atrial fibrillation  Assessment and Plan of Treatment: start Eliquis    Diagnosis: Anxiety  Assessment and Plan of Treatment: home meds  f/u with your Psych for med adjustment

## 2024-08-27 NOTE — DISCHARGE NOTE PROVIDER - DETAILS OF MALNUTRITION DIAGNOSIS/DIAGNOSES
This patient has been assessed with a concern for Malnutrition and was treated during this hospitalization for the following Nutrition diagnosis/diagnoses:     -  08/26/2024: Severe protein-calorie malnutrition

## 2024-08-27 NOTE — DISCHARGE NOTE NURSING/CASE MANAGEMENT/SOCIAL WORK - NSDCPEFALRISK_GEN_ALL_CORE
For information on Fall & Injury Prevention, visit: [email or web address] OR  [email or web address] OR  https://www.cdc.gov/steadi/patient.html

## 2024-08-27 NOTE — DISCHARGE NOTE PROVIDER - PROVIDER TOKENS
PROVIDER:[TOKEN:[[id number]],FOLLOWUP:[1-3 days]],PROVIDER:[TOKEN:[[id number]],FOLLOWUP:[1 week]]

## 2024-08-27 NOTE — PHARMACOTHERAPY INTERVENTION NOTE - COMMENTS
Patient and  at bedside were able to verify home medications. 
Patient is being discharged on Eliquis 5 mg PO BID for atrial fibrillation. Called outpatient pharmacy, copay $60, applied free 30-day coupon for first fill. Counseled patient on indication for medication, how to take this medication, and common side effects to expect.

## 2024-08-30 LAB
CULTURE RESULTS: SIGNIFICANT CHANGE UP
CULTURE RESULTS: SIGNIFICANT CHANGE UP
SPECIMEN SOURCE: SIGNIFICANT CHANGE UP
SPECIMEN SOURCE: SIGNIFICANT CHANGE UP

## 2024-09-04 ENCOUNTER — APPOINTMENT (OUTPATIENT)
Dept: CARDIOLOGY | Facility: CLINIC | Age: 74
End: 2024-09-04
Payer: MEDICARE

## 2024-09-04 ENCOUNTER — NON-APPOINTMENT (OUTPATIENT)
Age: 74
End: 2024-09-04

## 2024-09-04 VITALS
SYSTOLIC BLOOD PRESSURE: 132 MMHG | HEART RATE: 79 BPM | DIASTOLIC BLOOD PRESSURE: 80 MMHG | WEIGHT: 127 LBS | OXYGEN SATURATION: 98 % | BODY MASS INDEX: 21.68 KG/M2 | HEIGHT: 64 IN

## 2024-09-04 DIAGNOSIS — I48.91 UNSPECIFIED ATRIAL FIBRILLATION: ICD-10-CM

## 2024-09-04 DIAGNOSIS — I10 ESSENTIAL (PRIMARY) HYPERTENSION: ICD-10-CM

## 2024-09-04 DIAGNOSIS — G45.9 TRANSIENT CEREBRAL ISCHEMIC ATTACK, UNSPECIFIED: ICD-10-CM

## 2024-09-04 DIAGNOSIS — Z09 ENCOUNTER FOR FOLLOW-UP EXAMINATION AFTER COMPLETED TREATMENT FOR CONDITIONS OTHER THAN MALIGNANT NEOPLASM: ICD-10-CM

## 2024-09-04 PROCEDURE — 99214 OFFICE O/P EST MOD 30 MIN: CPT

## 2024-09-04 PROCEDURE — 93000 ELECTROCARDIOGRAM COMPLETE: CPT

## 2024-09-04 PROCEDURE — G2211 COMPLEX E/M VISIT ADD ON: CPT

## 2024-09-04 RX ORDER — PANTOPRAZOLE 40 MG/1
40 TABLET, DELAYED RELEASE ORAL DAILY
Refills: 0 | Status: ACTIVE | COMMUNITY

## 2024-09-04 RX ORDER — MIRTAZAPINE 15 MG/1
15 TABLET, FILM COATED ORAL DAILY
Refills: 0 | Status: ACTIVE | COMMUNITY

## 2024-09-04 RX ORDER — BUPROPION HYDROCHLORIDE 300 MG/1
300 TABLET, EXTENDED RELEASE ORAL DAILY
Refills: 0 | Status: ACTIVE | COMMUNITY

## 2024-09-04 RX ORDER — RISPERIDONE 2 MG/1
2 TABLET, FILM COATED ORAL
Refills: 0 | Status: ACTIVE | COMMUNITY

## 2024-09-04 RX ORDER — RISPERIDONE 1 MG/1
1 TABLET, FILM COATED ORAL DAILY
Refills: 0 | Status: ACTIVE | COMMUNITY

## 2024-09-04 RX ORDER — ESCITALOPRAM OXALATE 5 MG/1
5 TABLET ORAL DAILY
Refills: 0 | Status: ACTIVE | COMMUNITY

## 2024-09-04 RX ORDER — GABAPENTIN 100 MG/1
100 CAPSULE ORAL 3 TIMES DAILY
Refills: 0 | Status: ACTIVE | COMMUNITY

## 2024-09-04 RX ORDER — MULTIVITAMIN
TABLET ORAL DAILY
Refills: 0 | Status: ACTIVE | COMMUNITY

## 2024-09-04 NOTE — DISCUSSION/SUMMARY
[EKG obtained to assist in diagnosis and management of assessed problem(s)] : EKG obtained to assist in diagnosis and management of assessed problem(s) [FreeTextEntry1] :  Here today post hospital follow up, she was BIBA from home c/o'ing tremors all 4 extremities, facial numbness, B/L LE weakness, EMS noted initially slurred speech which self-resolved in transport to ED. Admitted with TIA with no acute CVA, Echo reviewed CW ASA/Statin Telemetry monitor in progress, will follow with EP for ILR Dr Echols Advise Neuro follow up Dr Santoyo   CT neg for PE but demonstrated coronary artery calcification will need  ischemic work up Nuclear stress test ordered, Echo shows NL LV FX preserved EF 60%   AF: Currently SR  afib s/p ablation w/ Dr. Orellana [facility] then changed to Dr. snow. Dr. Taylor planned to change ILR but pt had refused in the past. Pt has ILR placed 4 yrs ago but it  6 months ago EP consult appreciated MCOT as a bridge to new ILR.  She will follow with Dr Echols ( visit scheduled)  CW BB/Oral AC  Abnl CT chest: had a transient e/o of hypoxia to 89%, former smoker, no prior PFTs, emphysematous changes noted, have referred to Pulmonary for further evaluation/Repeat CT Scan   Reports she is having complete labs today that were ordered VIA PCP she will forward a copy to this office for review   OV after stress test   Plan DW Patient

## 2024-09-04 NOTE — REASON FOR VISIT
[Arrhythmia/ECG Abnorrmalities] : arrhythmia/ECG abnormalities [Hypertension] : hypertension [Other: ____] : [unfilled] [FreeTextEntry1] : Stella Villatoro is a 74-year-old woman with a history of atrial fibrillation (ablated in 2020 by Dr. Guerrero and with anticoagulation discontinued in 2023, was prescribed BB but was non compliant, s/p ILR), hypertension, hyperlipidemia, legally blind, COPD, spinal stenosis, depression,   [facility] Psych admit 8/9-22/24 for MDD with psychosis here today post hospital follow up, she was BIBA from home c/o tremors all 4 extremities, facial numbness, B/L LE weakness, EMS noted initially slurred speech which self-resolved in transport to ED. Admitted with TIA with no acute CVA, orthostatic negative  CT demonstrated coronary artery calcification will need  ischemic work up  Currently claims to be feeling well no cardiovascular complaints

## 2024-09-04 NOTE — CARDIOLOGY SUMMARY
[de-identified] : 9/4/24: TOMMIE [de-identified] :  TTE W or WO Ultrasound Enhancing Agent 8/26/24    1. Normal right ventricular cavity size.  2. Mild mitral regurgitation.  3. Trace to mild tricuspid regurgitation.  4. Interatrial septum is aneurysmal.  5. There is focal calcification of the aortic valve leaflets. No aortic valve stenosis.  6. There is mild calcification of the mitral valve annulus. 7. NL LV FX EF 60%

## 2024-09-10 DIAGNOSIS — Z87.891 PERSONAL HISTORY OF NICOTINE DEPENDENCE: ICD-10-CM

## 2024-09-10 DIAGNOSIS — E78.5 HYPERLIPIDEMIA, UNSPECIFIED: ICD-10-CM

## 2024-09-10 DIAGNOSIS — E43 UNSPECIFIED SEVERE PROTEIN-CALORIE MALNUTRITION: ICD-10-CM

## 2024-09-10 DIAGNOSIS — R29.810 FACIAL WEAKNESS: ICD-10-CM

## 2024-09-10 DIAGNOSIS — Z91.81 HISTORY OF FALLING: ICD-10-CM

## 2024-09-10 DIAGNOSIS — I48.91 UNSPECIFIED ATRIAL FIBRILLATION: ICD-10-CM

## 2024-09-10 DIAGNOSIS — R09.02 HYPOXEMIA: ICD-10-CM

## 2024-09-10 DIAGNOSIS — M16.12 UNILATERAL PRIMARY OSTEOARTHRITIS, LEFT HIP: ICD-10-CM

## 2024-09-10 DIAGNOSIS — H54.7 UNSPECIFIED VISUAL LOSS: ICD-10-CM

## 2024-09-10 DIAGNOSIS — F32.9 MAJOR DEPRESSIVE DISORDER, SINGLE EPISODE, UNSPECIFIED: ICD-10-CM

## 2024-09-10 DIAGNOSIS — F41.9 ANXIETY DISORDER, UNSPECIFIED: ICD-10-CM

## 2024-09-10 DIAGNOSIS — Z45.09 ENCOUNTER FOR ADJUSTMENT AND MANAGEMENT OF OTHER CARDIAC DEVICE: ICD-10-CM

## 2024-09-10 DIAGNOSIS — Z96.641 PRESENCE OF RIGHT ARTIFICIAL HIP JOINT: ICD-10-CM

## 2024-09-10 DIAGNOSIS — F29 UNSPECIFIED PSYCHOSIS NOT DUE TO A SUBSTANCE OR KNOWN PHYSIOLOGICAL CONDITION: ICD-10-CM

## 2024-09-10 DIAGNOSIS — Z90.710 ACQUIRED ABSENCE OF BOTH CERVIX AND UTERUS: ICD-10-CM

## 2024-09-10 DIAGNOSIS — Z88.8 ALLERGY STATUS TO OTHER DRUGS, MEDICAMENTS AND BIOLOGICAL SUBSTANCES: ICD-10-CM

## 2024-09-10 DIAGNOSIS — Z11.52 ENCOUNTER FOR SCREENING FOR COVID-19: ICD-10-CM

## 2024-09-10 DIAGNOSIS — R20.0 ANESTHESIA OF SKIN: ICD-10-CM

## 2024-09-10 DIAGNOSIS — R47.81 SLURRED SPEECH: ICD-10-CM

## 2024-09-10 DIAGNOSIS — I95.9 HYPOTENSION, UNSPECIFIED: ICD-10-CM

## 2024-09-10 DIAGNOSIS — I10 ESSENTIAL (PRIMARY) HYPERTENSION: ICD-10-CM

## 2024-09-10 DIAGNOSIS — T44.7X6A UNDERDOSING OF BETA-ADRENORECEPTOR ANTAGONISTS, INITIAL ENCOUNTER: ICD-10-CM

## 2024-09-10 DIAGNOSIS — Z88.2 ALLERGY STATUS TO SULFONAMIDES: ICD-10-CM

## 2024-09-10 DIAGNOSIS — Z79.82 LONG TERM (CURRENT) USE OF ASPIRIN: ICD-10-CM

## 2024-09-10 DIAGNOSIS — R29.898 OTHER SYMPTOMS AND SIGNS INVOLVING THE MUSCULOSKELETAL SYSTEM: ICD-10-CM

## 2024-09-10 DIAGNOSIS — G45.9 TRANSIENT CEREBRAL ISCHEMIC ATTACK, UNSPECIFIED: ICD-10-CM

## 2024-09-10 DIAGNOSIS — R25.1 TREMOR, UNSPECIFIED: ICD-10-CM

## 2024-09-10 DIAGNOSIS — Z79.891 LONG TERM (CURRENT) USE OF OPIATE ANALGESIC: ICD-10-CM

## 2024-10-03 NOTE — ED PROVIDER NOTE - STUDIES
03-Oct-2024 20:16
EKG - see results section for interpretation/Xray Image(s) - see wet read section for interpretation

## 2024-10-07 ENCOUNTER — APPOINTMENT (OUTPATIENT)
Dept: ELECTROPHYSIOLOGY | Facility: CLINIC | Age: 74
End: 2024-10-07
Payer: MEDICARE

## 2024-10-07 VITALS
HEIGHT: 62 IN | WEIGHT: 129.25 LBS | HEART RATE: 77 BPM | RESPIRATION RATE: 16 BRPM | DIASTOLIC BLOOD PRESSURE: 61 MMHG | BODY MASS INDEX: 23.79 KG/M2 | SYSTOLIC BLOOD PRESSURE: 123 MMHG | OXYGEN SATURATION: 99 %

## 2024-10-07 DIAGNOSIS — G45.9 TRANSIENT CEREBRAL ISCHEMIC ATTACK, UNSPECIFIED: ICD-10-CM

## 2024-10-07 DIAGNOSIS — I10 ESSENTIAL (PRIMARY) HYPERTENSION: ICD-10-CM

## 2024-10-07 PROCEDURE — 99215 OFFICE O/P EST HI 40 MIN: CPT

## 2024-10-07 PROCEDURE — 93000 ELECTROCARDIOGRAM COMPLETE: CPT

## 2024-10-07 PROCEDURE — G2211 COMPLEX E/M VISIT ADD ON: CPT

## 2024-10-10 ENCOUNTER — APPOINTMENT (OUTPATIENT)
Dept: CARDIOLOGY | Facility: CLINIC | Age: 74
End: 2024-10-10
Payer: MEDICARE

## 2024-10-10 PROCEDURE — A9500: CPT

## 2024-10-10 PROCEDURE — 93015 CV STRESS TEST SUPVJ I&R: CPT

## 2024-10-10 PROCEDURE — 78452 HT MUSCLE IMAGE SPECT MULT: CPT

## 2024-11-07 NOTE — ASU PATIENT PROFILE, ADULT - FALL HARM RISK - UNIVERSAL INTERVENTIONS
Bed in lowest position, wheels locked, appropriate side rails in place/Call bell, personal items and telephone in reach/Instruct patient to call for assistance before getting out of bed or chair/Non-slip footwear when patient is out of bed/[place] to call system/Physically safe environment - no spills, clutter or unnecessary equipment/Purposeful Proactive Rounding/Room/bathroom lighting operational, light cord in reach

## 2024-11-08 ENCOUNTER — OUTPATIENT (OUTPATIENT)
Dept: OUTPATIENT SERVICES | Facility: HOSPITAL | Age: 74
LOS: 1 days | Discharge: HOME | End: 2024-11-08
Payer: MEDICARE

## 2024-11-08 VITALS
SYSTOLIC BLOOD PRESSURE: 140 MMHG | TEMPERATURE: 97 F | DIASTOLIC BLOOD PRESSURE: 62 MMHG | OXYGEN SATURATION: 98 % | HEART RATE: 68 BPM | RESPIRATION RATE: 17 BRPM

## 2024-11-08 VITALS
TEMPERATURE: 97 F | RESPIRATION RATE: 17 BRPM | SYSTOLIC BLOOD PRESSURE: 124 MMHG | OXYGEN SATURATION: 96 % | WEIGHT: 128.97 LBS | HEIGHT: 62 IN | DIASTOLIC BLOOD PRESSURE: 71 MMHG | HEART RATE: 68 BPM

## 2024-11-08 DIAGNOSIS — Z98.890 OTHER SPECIFIED POSTPROCEDURAL STATES: Chronic | ICD-10-CM

## 2024-11-08 DIAGNOSIS — I48.91 UNSPECIFIED ATRIAL FIBRILLATION: ICD-10-CM

## 2024-11-08 DIAGNOSIS — I10 ESSENTIAL (PRIMARY) HYPERTENSION: ICD-10-CM

## 2024-11-08 DIAGNOSIS — Z90.710 ACQUIRED ABSENCE OF BOTH CERVIX AND UTERUS: Chronic | ICD-10-CM

## 2024-11-08 DIAGNOSIS — Z86.69 PERSONAL HISTORY OF OTHER DISEASES OF THE NERVOUS SYSTEM AND SENSE ORGANS: Chronic | ICD-10-CM

## 2024-11-08 DIAGNOSIS — Z96.641 PRESENCE OF RIGHT ARTIFICIAL HIP JOINT: Chronic | ICD-10-CM

## 2024-11-08 PROCEDURE — 33286 RMVL SUBQ CAR RHYTHM MNTR: CPT | Mod: 59

## 2024-11-08 PROCEDURE — 33286 RMVL SUBQ CAR RHYTHM MNTR: CPT

## 2024-11-08 PROCEDURE — 33285 INSJ SUBQ CAR RHYTHM MNTR: CPT

## 2024-11-08 PROCEDURE — C1764: CPT

## 2024-11-08 NOTE — ASU PREOP CHECKLIST - ALLERGIES REVIEWED
Left message for patient to see if she can come in for appointment on 12/13/2023 at 2:40 PM with Dr. German  Advised to call back asap to confirm    done

## 2024-11-08 NOTE — PACU DISCHARGE NOTE - COMMENTS
Discharge instructions given, verbalized understanding. All belongings are with the patient. Left the unit with staff via wheelchair.
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
## 2024-11-13 DIAGNOSIS — T82.111A BREAKDOWN (MECHANICAL) OF CARDIAC PULSE GENERATOR (BATTERY), INITIAL ENCOUNTER: ICD-10-CM
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
## 2024-11-18 NOTE — POST DISCHARGE NOTE - DETAILS:
pcp
Post procedure phone call completed; patient understood all discharge paperwork. No questions regarding medications or pain management. MD follow up appointment made. Patient was able to rest when they were discharged. Patient will recommend [facility], no complaints of hospital stay, satisfied with care. Instructed patient to contact provider with any further questions or concerns.

## 2024-11-20 NOTE — ED ADULT NURSE NOTE - CINV DISCH TEACH PARTICIP
What Type Of Note Output Would You Prefer (Optional)?: Standard Output
How Severe Is Your Rash?: mild
Is This A New Presentation, Or A Follow-Up?: Rash
Additional History: Bactrim and patient completed steroids completed on Monday. Bumps are returning after completing steroids. Patient is currently using Anti fungal cream. Patient stopped tanning cream and skin so soft cream. Patient switched laundry detergent to pure x.
Patient

## 2024-11-22 ENCOUNTER — APPOINTMENT (OUTPATIENT)
Dept: ELECTROPHYSIOLOGY | Facility: CLINIC | Age: 74
End: 2024-11-22
Payer: MEDICARE

## 2024-11-22 VITALS
HEIGHT: 62 IN | WEIGHT: 130 LBS | SYSTOLIC BLOOD PRESSURE: 124 MMHG | OXYGEN SATURATION: 98 % | DIASTOLIC BLOOD PRESSURE: 65 MMHG | BODY MASS INDEX: 23.92 KG/M2 | HEART RATE: 70 BPM

## 2024-11-22 DIAGNOSIS — I48.91 UNSPECIFIED ATRIAL FIBRILLATION: ICD-10-CM

## 2024-11-22 DIAGNOSIS — Z95.818 PRESENCE OF OTHER CARDIAC IMPLANTS AND GRAFTS: ICD-10-CM

## 2024-11-22 DIAGNOSIS — G45.9 TRANSIENT CEREBRAL ISCHEMIC ATTACK, UNSPECIFIED: ICD-10-CM

## 2024-11-22 PROCEDURE — 99212 OFFICE O/P EST SF 10 MIN: CPT

## 2024-11-22 PROCEDURE — 93285 PRGRMG DEV EVAL SCRMS IP: CPT

## 2024-11-26 NOTE — ED ADULT NURSE NOTE - NS ED NURSE RECORD ANOTHER VITAL SIGN
Due at 59 for repeat colonoscopy.  Last one 5 years ago with polyps.   Recommend repeat colonoscopy.  Risk-benefit alternatives of doing the procedure were thoroughly discussed with the patient agrees to proceed.  This includes a 0.02% risk of perforating the colon with polypectomies.  The RN will go over the bowel prep when scheduling.   
Patient with a symptomatic left inguinal hernia.  Treat conservatively.The patient was alerted to the signs and symptoms of hernia strangulation and to the signs of bowel obstruction secondary to hernia and was advised on what to do if this occurs.   
Status post open repair with mesh.  Excellent healing.  Follow-up as needed  
Yes

## 2024-12-04 ENCOUNTER — NON-APPOINTMENT (OUTPATIENT)
Age: 74
End: 2024-12-04

## 2024-12-04 ENCOUNTER — APPOINTMENT (OUTPATIENT)
Dept: ELECTROPHYSIOLOGY | Facility: CLINIC | Age: 74
End: 2024-12-04
Payer: MEDICARE

## 2024-12-04 VITALS
DIASTOLIC BLOOD PRESSURE: 80 MMHG | BODY MASS INDEX: 23.92 KG/M2 | HEART RATE: 69 BPM | HEIGHT: 62 IN | SYSTOLIC BLOOD PRESSURE: 130 MMHG | OXYGEN SATURATION: 98 % | WEIGHT: 130 LBS

## 2024-12-04 PROCEDURE — 93285 PRGRMG DEV EVAL SCRMS IP: CPT

## 2025-01-08 ENCOUNTER — NON-APPOINTMENT (OUTPATIENT)
Age: 75
End: 2025-01-08

## 2025-01-08 ENCOUNTER — APPOINTMENT (OUTPATIENT)
Dept: ELECTROPHYSIOLOGY | Facility: CLINIC | Age: 75
End: 2025-01-08
Payer: MEDICARE

## 2025-01-08 PROCEDURE — 93298 REM INTERROG DEV EVAL SCRMS: CPT

## 2025-02-04 NOTE — CHART NOTE - NSCHARTNOTEFT_GEN_A_CORE
73 y o female presenting to the ED on 5/20/24 complaining of dizziness.  Schedule coordinator made a courtesy call to assist with scheduling the recommended primary care appointment and received no answer.  Contact information was provided via voicemail. 3 = A little assistance

## 2025-02-04 NOTE — ASU PATIENT PROFILE, ADULT - NSICDXPASTMEDICALHX_GEN_ALL_CORE_FT
PAST MEDICAL HISTORY:  Anxiety and depression     Arthritis     COVID-19 vaccine series completed     History of atrial fibrillation     History of cardiac radiofrequency ablation 2021    Hypercholesterolemia     Hypertension     Legally blind Bilaterally - notes L< 20% vision    Muscle cramps     Optic nerve drusen, bilateral     Osteoarthritis of left hip     Primary osteoarthritis of right hip     Status post placement of implantable loop recorder      Home

## 2025-02-12 ENCOUNTER — NON-APPOINTMENT (OUTPATIENT)
Age: 75
End: 2025-02-12

## 2025-02-12 ENCOUNTER — APPOINTMENT (OUTPATIENT)
Dept: ELECTROPHYSIOLOGY | Facility: CLINIC | Age: 75
End: 2025-02-12
Payer: MEDICARE

## 2025-02-12 PROCEDURE — 93298 REM INTERROG DEV EVAL SCRMS: CPT

## 2025-02-18 NOTE — ED ADULT TRIAGE NOTE - PAIN: PRESENCE, MLM
Call bell/Explanation of exam/test/Fall precautions/Bedside visitors/Position of comfort/Side rails complains of pain/discomfort

## 2025-03-19 ENCOUNTER — NON-APPOINTMENT (OUTPATIENT)
Age: 75
End: 2025-03-19

## 2025-03-19 ENCOUNTER — APPOINTMENT (OUTPATIENT)
Dept: ELECTROPHYSIOLOGY | Facility: CLINIC | Age: 75
End: 2025-03-19
Payer: MEDICARE

## 2025-03-19 PROCEDURE — 93298 REM INTERROG DEV EVAL SCRMS: CPT

## 2025-04-23 ENCOUNTER — NON-APPOINTMENT (OUTPATIENT)
Age: 75
End: 2025-04-23

## 2025-04-23 ENCOUNTER — APPOINTMENT (OUTPATIENT)
Dept: ELECTROPHYSIOLOGY | Facility: CLINIC | Age: 75
End: 2025-04-23
Payer: MEDICARE

## 2025-04-23 PROCEDURE — 93298 REM INTERROG DEV EVAL SCRMS: CPT

## 2025-04-25 ENCOUNTER — APPOINTMENT (OUTPATIENT)
Dept: CARDIOLOGY | Facility: CLINIC | Age: 75
End: 2025-04-25
Payer: MEDICARE

## 2025-04-25 ENCOUNTER — NON-APPOINTMENT (OUTPATIENT)
Age: 75
End: 2025-04-25

## 2025-04-25 VITALS
OXYGEN SATURATION: 96 % | DIASTOLIC BLOOD PRESSURE: 78 MMHG | BODY MASS INDEX: 23.92 KG/M2 | HEIGHT: 62 IN | HEART RATE: 80 BPM | WEIGHT: 130 LBS | SYSTOLIC BLOOD PRESSURE: 128 MMHG

## 2025-04-25 DIAGNOSIS — I10 ESSENTIAL (PRIMARY) HYPERTENSION: ICD-10-CM

## 2025-04-25 DIAGNOSIS — I48.91 UNSPECIFIED ATRIAL FIBRILLATION: ICD-10-CM

## 2025-04-25 PROCEDURE — 99214 OFFICE O/P EST MOD 30 MIN: CPT

## 2025-04-25 PROCEDURE — G2211 COMPLEX E/M VISIT ADD ON: CPT

## 2025-04-25 PROCEDURE — 93000 ELECTROCARDIOGRAM COMPLETE: CPT

## 2025-06-02 NOTE — ED PROVIDER NOTE - DISCUSSED CASE WITH MULTISELECT OPTIONS
Benefits, risks, and possible complications of procedure explained to patient/caregiver who verbalized understanding and gave verbal consent. Consultant

## 2025-06-04 ENCOUNTER — NON-APPOINTMENT (OUTPATIENT)
Age: 75
End: 2025-06-04

## 2025-06-04 ENCOUNTER — APPOINTMENT (OUTPATIENT)
Dept: ELECTROPHYSIOLOGY | Facility: CLINIC | Age: 75
End: 2025-06-04
Payer: MEDICARE

## 2025-06-04 VITALS
BODY MASS INDEX: 25.21 KG/M2 | WEIGHT: 137 LBS | HEIGHT: 62 IN | OXYGEN SATURATION: 98 % | DIASTOLIC BLOOD PRESSURE: 85 MMHG | SYSTOLIC BLOOD PRESSURE: 151 MMHG | HEART RATE: 81 BPM

## 2025-06-04 DIAGNOSIS — G45.9 TRANSIENT CEREBRAL ISCHEMIC ATTACK, UNSPECIFIED: ICD-10-CM

## 2025-06-04 DIAGNOSIS — Z95.818 PRESENCE OF OTHER CARDIAC IMPLANTS AND GRAFTS: ICD-10-CM

## 2025-06-04 PROCEDURE — 93285 PRGRMG DEV EVAL SCRMS IP: CPT

## 2025-06-04 PROCEDURE — 99211 OFF/OP EST MAY X REQ PHY/QHP: CPT

## 2025-07-10 ENCOUNTER — NON-APPOINTMENT (OUTPATIENT)
Age: 75
End: 2025-07-10

## 2025-07-10 ENCOUNTER — APPOINTMENT (OUTPATIENT)
Dept: ELECTROPHYSIOLOGY | Facility: CLINIC | Age: 75
End: 2025-07-10
Payer: MEDICARE

## 2025-07-10 PROCEDURE — 93298 REM INTERROG DEV EVAL SCRMS: CPT

## 2025-07-14 NOTE — ED PROVIDER NOTE - DISPOSITION TYPE
Reviewed, he was seen by clinical psychologist in 2016 and 2017 for diagnoses of ADHD and anxiety.  My recent office note was clear that he has never taken medication for anxiety.  I have addended my note to reflect that he has no current issues with ADHD or anxiety.     DISCHARGE

## 2025-08-14 ENCOUNTER — NON-APPOINTMENT (OUTPATIENT)
Age: 75
End: 2025-08-14

## 2025-08-14 ENCOUNTER — APPOINTMENT (OUTPATIENT)
Dept: ELECTROPHYSIOLOGY | Facility: CLINIC | Age: 75
End: 2025-08-14
Payer: MEDICARE

## 2025-08-14 PROCEDURE — 93298 REM INTERROG DEV EVAL SCRMS: CPT

## 2025-09-18 ENCOUNTER — NON-APPOINTMENT (OUTPATIENT)
Age: 75
End: 2025-09-18

## 2025-09-18 ENCOUNTER — APPOINTMENT (OUTPATIENT)
Dept: ELECTROPHYSIOLOGY | Facility: CLINIC | Age: 75
End: 2025-09-18
Payer: MEDICARE

## 2025-09-18 PROCEDURE — 93298 REM INTERROG DEV EVAL SCRMS: CPT

## (undated) DEVICE — DRAPE BILATERAL LIMB 72X120"

## (undated) DEVICE — DRAPE IOBAN 10X5"

## (undated) DEVICE — SOL IRR BAG NS 0.9% 3000ML

## (undated) DEVICE — BLANKET WARMER UPPER ADULT

## (undated) DEVICE — NDL SPINAL 18G X 3.5"

## (undated) DEVICE — SEALER BIPOLAR 6.0 AQUAMANTYS

## (undated) DEVICE — SUT STRATAFIX SPIRAL MONOCRYL PLUS 4-0 14CM PS-2 UNDYED

## (undated) DEVICE — DRAPE SHEET XL 77X98"

## (undated) DEVICE — DRSG COBAN 6"

## (undated) DEVICE — PACK TOTAL HIP CUST

## (undated) DEVICE — POSITIONER POSITIONING ROLL  5X17"

## (undated) DEVICE — DRAPE MAYO STAND 30"

## (undated) DEVICE — SYR LUER LOK 20CC

## (undated) DEVICE — SUT DERMABOND PRINEO 60CM

## (undated) DEVICE — SOL IRR POUR H2O 1500ML

## (undated) DEVICE — DRAPE C ARM 41X140"

## (undated) DEVICE — HOOD FLYTE STRYKER HELMET SHIELD

## (undated) DEVICE — ELCTR PENCIL NEPTUNE SMOKE EVACUATION

## (undated) DEVICE — SUT VICRYL PLUS 1 27" CP UNDYED

## (undated) DEVICE — WRAP COMPRESSION CALF MED

## (undated) DEVICE — IRRISEPT JET LAVAGE W 0.05 PCT CHG

## (undated) DEVICE — DRAPE TOP SHEET 53"X101"

## (undated) DEVICE — SAW BLADE STRYKER SAGITTAL AGGRESSIVE 25X86.5X1.32MM

## (undated) DEVICE — SYR LUER LOK 50CC

## (undated) DEVICE — SOL IRR POUR NS 0.9% 500ML

## (undated) DEVICE — DRSG STOCKINETTE TUBULAR COTTON 2PLY 6X72"

## (undated) DEVICE — DRSG STOCKINETTE IMPERVIOUS XL

## (undated) DEVICE — Device

## (undated) DEVICE — SYM-STRYKER SYSTEM 7: Type: DURABLE MEDICAL EQUIPMENT